# Patient Record
Sex: FEMALE | Employment: UNEMPLOYED | URBAN - NONMETROPOLITAN AREA
[De-identification: names, ages, dates, MRNs, and addresses within clinical notes are randomized per-mention and may not be internally consistent; named-entity substitution may affect disease eponyms.]

---

## 2019-07-25 ENCOUNTER — OFFICE VISIT (OUTPATIENT)
Dept: PHYSICAL MEDICINE AND REHAB | Age: 54
End: 2019-07-25
Payer: MEDICARE

## 2019-07-25 VITALS
RESPIRATION RATE: 20 BRPM | BODY MASS INDEX: 40.15 KG/M2 | HEIGHT: 63 IN | DIASTOLIC BLOOD PRESSURE: 90 MMHG | SYSTOLIC BLOOD PRESSURE: 135 MMHG | WEIGHT: 226.6 LBS | HEART RATE: 88 BPM

## 2019-07-25 DIAGNOSIS — M54.16 LUMBAR RADICULOPATHY: Primary | ICD-10-CM

## 2019-07-25 PROCEDURE — 99204 OFFICE O/P NEW MOD 45 MIN: CPT | Performed by: PHYSICAL MEDICINE & REHABILITATION

## 2019-07-25 PROCEDURE — G8427 DOCREV CUR MEDS BY ELIG CLIN: HCPCS | Performed by: PHYSICAL MEDICINE & REHABILITATION

## 2019-07-25 PROCEDURE — 1036F TOBACCO NON-USER: CPT | Performed by: PHYSICAL MEDICINE & REHABILITATION

## 2019-07-25 PROCEDURE — 3017F COLORECTAL CA SCREEN DOC REV: CPT | Performed by: PHYSICAL MEDICINE & REHABILITATION

## 2019-07-25 PROCEDURE — G8417 CALC BMI ABV UP PARAM F/U: HCPCS | Performed by: PHYSICAL MEDICINE & REHABILITATION

## 2019-07-25 RX ORDER — ALBUTEROL SULFATE 2.5 MG/3ML
2.5 SOLUTION RESPIRATORY (INHALATION) EVERY 6 HOURS PRN
COMMUNITY
Start: 2018-07-11

## 2019-07-25 RX ORDER — ROPINIROLE 2 MG/1
TABLET, FILM COATED ORAL
Refills: 0 | COMMUNITY
Start: 2019-07-04

## 2019-07-25 RX ORDER — PREGABALIN 75 MG/1
CAPSULE ORAL
Qty: 60 CAPSULE | Refills: 3 | Status: ON HOLD | OUTPATIENT
Start: 2019-07-25 | End: 2020-01-29 | Stop reason: HOSPADM

## 2019-07-25 RX ORDER — ACETAMINOPHEN 500 MG
TABLET ORAL
Refills: 0 | Status: ON HOLD | COMMUNITY
Start: 2019-05-08 | End: 2020-01-29 | Stop reason: HOSPADM

## 2019-07-25 RX ORDER — LORAZEPAM 0.5 MG/1
TABLET ORAL
Refills: 0 | Status: ON HOLD | COMMUNITY
Start: 2019-05-15 | End: 2020-01-29 | Stop reason: HOSPADM

## 2019-07-25 RX ORDER — CELECOXIB 200 MG/1
200 CAPSULE ORAL DAILY
Qty: 60 CAPSULE | Refills: 3 | Status: ON HOLD | OUTPATIENT
Start: 2019-07-25 | End: 2020-01-29 | Stop reason: HOSPADM

## 2019-07-25 RX ORDER — ONDANSETRON 4 MG/1
4 TABLET, FILM COATED ORAL EVERY 8 HOURS PRN
Status: ON HOLD | COMMUNITY
Start: 2018-05-07 | End: 2020-01-29 | Stop reason: HOSPADM

## 2019-07-25 RX ORDER — DILTIAZEM HYDROCHLORIDE 60 MG/1
TABLET, FILM COATED ORAL
Refills: 0 | COMMUNITY
Start: 2019-07-08

## 2019-07-25 RX ORDER — TRAMADOL HYDROCHLORIDE 50 MG/1
50 TABLET ORAL EVERY 6 HOURS PRN
Status: ON HOLD | COMMUNITY
End: 2020-01-29 | Stop reason: HOSPADM

## 2019-07-25 RX ORDER — CYCLOBENZAPRINE HCL 10 MG
TABLET ORAL
Refills: 0 | Status: ON HOLD | COMMUNITY
Start: 2019-07-08 | End: 2020-01-29 | Stop reason: HOSPADM

## 2019-07-25 RX ORDER — TRAZODONE HYDROCHLORIDE 100 MG/1
TABLET ORAL
Refills: 0 | Status: ON HOLD | COMMUNITY
Start: 2019-07-08 | End: 2020-01-29 | Stop reason: HOSPADM

## 2019-07-25 NOTE — PROGRESS NOTES
Pain Management     [facility]  [facility]  [address] [facility] [address] [place]  Dept: [phone]  Loc: [address] [place] [address] Isa Gayle MD  [facility] [address]  [place], [address]     Jayson Poe is a 47 y.o. female, who came in due to back pain, hip pain and knee pain    History of lumbar surgical decompression and fusion in Feb 2019, L5-S1 PLIF. Onset: 10years    Cause of the symptom(s): degenerative (arthritis)    Quality of Symptoms: aching, throbbing, burning, shooting, numbness and tingling    Aggravating factors: lifting, twisting and bending forward    Relieving factors: use of medication and exercise    Red Flags: pain at night and pain with lying down    Treatment done: physical therapy, injection, anti-inflammatory medication, muscle relaxant, steroid and opioid      Current pain level: 8 on the scale of 0-10 ( 10 being worst)     Came today due to pain, even after post surgery.     Worked as a  at [employer]    Allergies   Allergen Reactions    Amitriptyline      Other reaction(s): Confusion, Palpitations    Iodides Anaphylaxis    Penicillins Hives    Codeine Nausea And Vomiting     Nausea and vomiting      Naproxen Sodium      Other reaction(s): Ulcers/Lesions    Oxycodone-Acetaminophen Other (See Comments)    Sulfa Antibiotics      Other reaction(s): Vomiting and HIves  Nausea and vomiting      Allobarbital        Social History     Socioeconomic History    Marital status: Unknown     Spouse name: Not on file    Number of children: Not on file    Years of education: Not on file    Highest education level: Not on file   Occupational History    Not on file   Social Needs    Financial resource strain: Not on file    Food insecurity:     Worry: Not on file     Inability: Not on file    Transportation needs:     Medical: Not on file     Non-medical: Not on file   Tobacco Use    normal.       Neurologic Exam     Mental Status   Oriented to person, place, and time. Attention: normal.   Speech: speech is normal   Level of consciousness: alert  Normal comprehension. Ortho Exam  Tender , lateral and medial knee       Assessment and Plan:   Post Lumbar Laminectomy   Left Knee DJD         Trial of celebrex 200 mg per day. Trial of lyrica. Follow up in 1 month. Time spent with patient was 45 minutes. More than 50% was spent counseling/coordinating the patient's care.        Krysta Dubrin MD   Spine Medicine/PM&R

## 2019-07-26 ENCOUNTER — TELEPHONE (OUTPATIENT)
Dept: PHYSICAL MEDICINE AND REHAB | Age: 54
End: 2019-07-26

## 2020-01-21 ENCOUNTER — HOSPITAL ENCOUNTER (OUTPATIENT)
Age: 55
Discharge: HOME | End: 2020-01-21
Payer: COMMERCIAL

## 2020-01-21 ENCOUNTER — HOSPITAL ENCOUNTER (OUTPATIENT)
Dept: GENERAL RADIOLOGY | Age: 55
Discharge: HOME | End: 2020-01-21
Payer: COMMERCIAL

## 2020-01-21 PROCEDURE — 73560 X-RAY EXAM OF KNEE 1 OR 2: CPT

## 2020-01-25 ENCOUNTER — HOSPITAL ENCOUNTER (INPATIENT)
Age: 55
LOS: 4 days | Discharge: HOME | DRG: 753 | End: 2020-01-29
Attending: PSYCHIATRY & NEUROLOGY | Admitting: PSYCHIATRY & NEUROLOGY
Payer: MEDICARE

## 2020-01-25 PROBLEM — F31.9 BIPOLAR 1 DISORDER (HCC): Status: ACTIVE | Noted: 2020-01-25

## 2020-01-25 PROCEDURE — 1240000000 HC EMOTIONAL WELLNESS R&B

## 2020-01-25 PROCEDURE — 6370000000 HC RX 637 (ALT 250 FOR IP): Performed by: PSYCHIATRY & NEUROLOGY

## 2020-01-25 RX ORDER — HYDROXYZINE HYDROCHLORIDE 25 MG/1
25 TABLET, FILM COATED ORAL 3 TIMES DAILY PRN
Status: DISCONTINUED | OUTPATIENT
Start: 2020-01-25 | End: 2020-01-29 | Stop reason: HOSPADM

## 2020-01-25 RX ORDER — ALBUTEROL SULFATE 2.5 MG/3ML
2.5 SOLUTION RESPIRATORY (INHALATION) EVERY 6 HOURS PRN
Status: DISCONTINUED | OUTPATIENT
Start: 2020-01-25 | End: 2020-01-29 | Stop reason: HOSPADM

## 2020-01-25 RX ORDER — SERTRALINE HYDROCHLORIDE 25 MG/1
25 TABLET, FILM COATED ORAL DAILY
Status: ON HOLD | COMMUNITY
End: 2020-01-29 | Stop reason: HOSPADM

## 2020-01-25 RX ORDER — PANTOPRAZOLE SODIUM 40 MG/1
40 TABLET, DELAYED RELEASE ORAL DAILY
Status: DISCONTINUED | OUTPATIENT
Start: 2020-01-25 | End: 2020-01-29 | Stop reason: HOSPADM

## 2020-01-25 RX ORDER — BENZTROPINE MESYLATE 1 MG/ML
2 INJECTION INTRAMUSCULAR; INTRAVENOUS 2 TIMES DAILY PRN
Status: DISCONTINUED | OUTPATIENT
Start: 2020-01-25 | End: 2020-01-29 | Stop reason: HOSPADM

## 2020-01-25 RX ORDER — SERTRALINE HYDROCHLORIDE 25 MG/1
25 TABLET, FILM COATED ORAL DAILY
Status: DISCONTINUED | OUTPATIENT
Start: 2020-01-25 | End: 2020-01-26

## 2020-01-25 RX ORDER — MAGNESIUM HYDROXIDE/ALUMINUM HYDROXICE/SIMETHICONE 120; 1200; 1200 MG/30ML; MG/30ML; MG/30ML
15 SUSPENSION ORAL EVERY 6 HOURS PRN
Status: DISCONTINUED | OUTPATIENT
Start: 2020-01-25 | End: 2020-01-29 | Stop reason: HOSPADM

## 2020-01-25 RX ORDER — ROPINIROLE 2 MG/1
4 TABLET, FILM COATED ORAL NIGHTLY
Status: DISCONTINUED | OUTPATIENT
Start: 2020-01-25 | End: 2020-01-29 | Stop reason: HOSPADM

## 2020-01-25 RX ORDER — PANTOPRAZOLE SODIUM 40 MG/1
40 TABLET, DELAYED RELEASE ORAL DAILY
COMMUNITY

## 2020-01-25 RX ORDER — NICOTINE 21 MG/24HR
1 PATCH, TRANSDERMAL 24 HOURS TRANSDERMAL DAILY
Status: DISCONTINUED | OUTPATIENT
Start: 2020-01-25 | End: 2020-01-27

## 2020-01-25 RX ORDER — ALBUTEROL SULFATE 90 UG/1
2 AEROSOL, METERED RESPIRATORY (INHALATION) EVERY 6 HOURS PRN
Status: DISCONTINUED | OUTPATIENT
Start: 2020-01-25 | End: 2020-01-29 | Stop reason: HOSPADM

## 2020-01-25 RX ORDER — TRAZODONE HYDROCHLORIDE 50 MG/1
50 TABLET ORAL NIGHTLY PRN
Status: DISCONTINUED | OUTPATIENT
Start: 2020-01-25 | End: 2020-01-27

## 2020-01-25 RX ADMIN — TRAZODONE HYDROCHLORIDE 50 MG: 50 TABLET ORAL at 22:10

## 2020-01-25 RX ADMIN — Medication 2 PUFF: at 22:14

## 2020-01-25 RX ADMIN — METOPROLOL TARTRATE 25 MG: 25 TABLET ORAL at 22:09

## 2020-01-25 RX ADMIN — ROPINIROLE HYDROCHLORIDE 4 MG: 2 TABLET, FILM COATED ORAL at 22:08

## 2020-01-25 RX ADMIN — PANTOPRAZOLE SODIUM 40 MG: 40 TABLET, DELAYED RELEASE ORAL at 22:09

## 2020-01-25 RX ADMIN — SERTRALINE HYDROCHLORIDE 25 MG: 25 TABLET ORAL at 22:09

## 2020-01-25 ASSESSMENT — SLEEP AND FATIGUE QUESTIONNAIRES
DIFFICULTY ARISING: NO
AVERAGE NUMBER OF SLEEP HOURS: 4
SLEEP PATTERN: DIFFICULTY FALLING ASLEEP;INSOMNIA;RESTLESSNESS
DO YOU USE A SLEEP AID: NO
DIFFICULTY STAYING ASLEEP: YES
RESTFUL SLEEP: NO
DO YOU HAVE DIFFICULTY SLEEPING: YES
DIFFICULTY FALLING ASLEEP: YES

## 2020-01-25 ASSESSMENT — PAIN DESCRIPTION - LOCATION: LOCATION: KNEE;BACK

## 2020-01-25 ASSESSMENT — PAIN DESCRIPTION - FREQUENCY: FREQUENCY: CONTINUOUS

## 2020-01-25 ASSESSMENT — LIFESTYLE VARIABLES: HISTORY_ALCOHOL_USE: NO

## 2020-01-25 ASSESSMENT — PAIN DESCRIPTION - ONSET: ONSET: ON-GOING

## 2020-01-25 ASSESSMENT — PATIENT HEALTH QUESTIONNAIRE - PHQ9: SUM OF ALL RESPONSES TO PHQ QUESTIONS 1-9: 18

## 2020-01-25 ASSESSMENT — PAIN SCALES - GENERAL
PAINLEVEL_OUTOF10: 0
PAINLEVEL_OUTOF10: 8

## 2020-01-25 ASSESSMENT — PAIN DESCRIPTION - DIRECTION: RADIATING_TOWARDS: LEFT HIP

## 2020-01-25 ASSESSMENT — PAIN DESCRIPTION - ORIENTATION: ORIENTATION: LEFT

## 2020-01-25 ASSESSMENT — PAIN DESCRIPTION - PAIN TYPE: TYPE: CHRONIC PAIN

## 2020-01-25 ASSESSMENT — PAIN DESCRIPTION - DESCRIPTORS: DESCRIPTORS: ACHING;CONSTANT

## 2020-01-25 ASSESSMENT — PAIN - FUNCTIONAL ASSESSMENT: PAIN_FUNCTIONAL_ASSESSMENT: PREVENTS OR INTERFERES SOME ACTIVE ACTIVITIES AND ADLS

## 2020-01-26 PROCEDURE — 6370000000 HC RX 637 (ALT 250 FOR IP): Performed by: PSYCHIATRY & NEUROLOGY

## 2020-01-26 PROCEDURE — 6370000000 HC RX 637 (ALT 250 FOR IP): Performed by: NURSE PRACTITIONER

## 2020-01-26 PROCEDURE — 1240000000 HC EMOTIONAL WELLNESS R&B

## 2020-01-26 PROCEDURE — 97161 PT EVAL LOW COMPLEX 20 MIN: CPT

## 2020-01-26 PROCEDURE — 90792 PSYCH DIAG EVAL W/MED SRVCS: CPT | Performed by: NURSE PRACTITIONER

## 2020-01-26 PROCEDURE — 6370000000 HC RX 637 (ALT 250 FOR IP): Performed by: PHYSICIAN ASSISTANT

## 2020-01-26 RX ORDER — ONDANSETRON 4 MG/1
4 TABLET, FILM COATED ORAL EVERY 8 HOURS PRN
Status: DISPENSED | OUTPATIENT
Start: 2020-01-26 | End: 2020-01-29

## 2020-01-26 RX ORDER — DIAPER,BRIEF,INFANT-TODD,DISP
EACH MISCELLANEOUS 2 TIMES DAILY
Status: DISCONTINUED | OUTPATIENT
Start: 2020-01-26 | End: 2020-01-29 | Stop reason: HOSPADM

## 2020-01-26 RX ORDER — ARIPIPRAZOLE 5 MG/1
5 TABLET ORAL DAILY
Status: DISCONTINUED | OUTPATIENT
Start: 2020-01-26 | End: 2020-01-29 | Stop reason: HOSPADM

## 2020-01-26 RX ADMIN — SERTRALINE HYDROCHLORIDE 25 MG: 25 TABLET ORAL at 08:44

## 2020-01-26 RX ADMIN — METOPROLOL TARTRATE 25 MG: 25 TABLET ORAL at 08:44

## 2020-01-26 RX ADMIN — TRAZODONE HYDROCHLORIDE 50 MG: 50 TABLET ORAL at 22:23

## 2020-01-26 RX ADMIN — HYDROCORTISONE: 1 CREAM TOPICAL at 22:28

## 2020-01-26 RX ADMIN — ARIPIPRAZOLE 5 MG: 5 TABLET ORAL at 22:23

## 2020-01-26 RX ADMIN — METOPROLOL TARTRATE 25 MG: 25 TABLET ORAL at 22:22

## 2020-01-26 RX ADMIN — Medication 2 PUFF: at 22:22

## 2020-01-26 RX ADMIN — PANTOPRAZOLE SODIUM 40 MG: 40 TABLET, DELAYED RELEASE ORAL at 08:43

## 2020-01-26 RX ADMIN — HYDROXYZINE HYDROCHLORIDE 25 MG: 25 TABLET, FILM COATED ORAL at 17:52

## 2020-01-26 RX ADMIN — ROPINIROLE HYDROCHLORIDE 4 MG: 2 TABLET, FILM COATED ORAL at 22:23

## 2020-01-26 RX ADMIN — Medication 2 PUFF: at 08:46

## 2020-01-26 ASSESSMENT — PAIN DESCRIPTION - DESCRIPTORS: DESCRIPTORS: TIGHTNESS;SQUEEZING

## 2020-01-26 ASSESSMENT — PAIN DESCRIPTION - FREQUENCY: FREQUENCY: CONTINUOUS

## 2020-01-26 ASSESSMENT — PAIN SCALES - GENERAL: PAINLEVEL_OUTOF10: 8

## 2020-01-26 ASSESSMENT — PAIN DESCRIPTION - ORIENTATION: ORIENTATION: LEFT

## 2020-01-26 ASSESSMENT — LIFESTYLE VARIABLES: HISTORY_ALCOHOL_USE: NO

## 2020-01-26 ASSESSMENT — PAIN DESCRIPTION - LOCATION: LOCATION: KNEE

## 2020-01-26 ASSESSMENT — PAIN DESCRIPTION - PAIN TYPE: TYPE: CHRONIC PAIN

## 2020-01-26 NOTE — GROUP NOTE
Group Therapy Note    Date: 1/26/2020    Group Start Time: 1100  Group End Time: 5745  Group Topic: Group Documentation    [facility]    Jillian Tijerina LPN        Group Therapy Note    Attendees:17/17    Patient participated with staff doing a safety check of room.        Signature:  Jillian Tijerina LPN

## 2020-01-26 NOTE — H&P
HISTORY and [address] MORALES [address]       NAME:  Tuyet Lawton  MRN: [id number]   YOB: 1965   Date: 1/26/2020   Age: 49 y.o. Gender: female     COMPLAINT AND PRESENT HISTORY:      Tuyet Lawton is 49 y.o.,  female, involuntarily admitted because of Bipolar Disorder. Pt presented to [facility] ED with complaints of increased depression x 3-4 weeks. She admitted to suicidal ideations with \"mulitple plans in place\". Pt stated she had plans to either OD on her medications or go outside and freeze to death. She denied HI, AH, VH in ED. Cooperative upon admit to [facility]. Pt currently denies suicidal and homicidal ideations. Pt has a history of previous suicide attempts. No current auditory, visual or tactile hallucinations. Pt feels hopeless, helpless, anxious and lonely. She has no family support and states her depression symptoms get worse when she is home alone. Poor insight. Pt has poor sleep and loss of appetite. Patients current stressors include loss of her job and housing. Pt states she used to live in a camper parked in her sister's yard, but cannot afford to stay there any longer. She is now currently homeless. Patient denies any current alcohol or substance abuse, occasional marijuana use. Pt has been non compliant with her outpatient psychiatric medications x 5-6 months. DIAGNOSTIC RESULTS   Labs:  CBC: No results for input(s): WBC, HGB, PLT in the last 72 hours. BMP:  No results for input(s): NA, K, CL, CO2, BUN, CREATININE, GLUCOSE in the last 72 hours. Hepatic: No results for input(s): AST, ALT, ALB, BILITOT, ALKPHOS in the last 72 hours. Lipids: No results for input(s): CHOL, HDL in the last 72 hours.     Invalid input(s): LDLCALCU  U/A:No results found for: NITRITE, COLORU, WBCUA, RBCUA, MUCUS, BACTERIA, CLARITYU, SPECGRAV, LEUKOCYTESUR, BLOODU, GLUCOSEU, AMORPHOUS    PAST MEDICAL HISTORY     Past Medical History:   Diagnosis Date    Arthritis     Asthma     COPD (chronic obstructive pulmonary disease) (HCC)     Eczema of face     Hypertension     Psychiatric problem     Restless legs      Pt denies any history of Diabetes mellitus type 2, stroke, heart disease, GERD, HLD, Cancer, Seizures,Thyroid disease, Kidney Disease, Hepatitis, TB.    SURGICAL HISTORY       Past Surgical History:   Procedure Laterality Date    BACK SURGERY      LUMBAR FUSION      TOTAL KNEE ARTHROPLASTY Left        FAMILY HISTORY     No family history on file.     SOCIAL HISTORY       Social History     Socioeconomic History    Marital status: Unknown     Spouse name: Not on file    Number of children: Not on file    Years of education: Not on file    Highest education level: Not on file   Occupational History    Not on file   Social Needs    Financial resource strain: Not on file    Food insecurity:     Worry: Not on file     Inability: Not on file    Transportation needs:     Medical: Not on file     Non-medical: Not on file   Tobacco Use    Smoking status: Never Smoker    Smokeless tobacco: Never Used   Substance and Sexual Activity    Alcohol use: Not Currently    Drug use: Never    Sexual activity: Not on file   Lifestyle    Physical activity:     Days per week: Not on file     Minutes per session: Not on file    Stress: Not on file   Relationships    Social connections:     Talks on phone: Not on file     Gets together: Not on file     Attends Mandaen service: Not on file     Active member of club or organization: Not on file     Attends meetings of clubs or organizations: Not on file     Relationship status: Not on file    Intimate partner violence:     Fear of current or ex partner: Not on file     Emotionally abused: Not on file     Physically abused: Not on file     Forced sexual activity: Not on file   Other Topics Concern    Not on file   Social History Narrative    Not on file        REVIEW OF SYSTEMS      Allergies   Allergen Reactions    Amitriptyline      Other reaction(s): Confusion, Palpitations    Iodides Anaphylaxis    Penicillins Hives    Codeine Nausea And Vomiting     Nausea and vomiting      Naproxen Sodium      Other reaction(s): Ulcers/Lesions    Oxycodone-Acetaminophen Other (See Comments)    Sulfa Antibiotics      Other reaction(s): Vomiting and HIves  Nausea and vomiting      Allobarbital        No current facility-administered medications on file prior to encounter. Current Outpatient Medications on File Prior to Encounter   Medication Sig Dispense Refill    sertraline (ZOLOFT) 25 MG tablet Take 25 mg by mouth daily      pantoprazole (PROTONIX) 40 MG tablet Take 40 mg by mouth daily      traZODone (DESYREL) 100 MG tablet take 2 tablets by mouth at bedtime if needed  0    traMADol (ULTRAM) 50 MG tablet Take 50 mg by mouth every 6 hours as needed.        rOPINIRole (REQUIP) 2 MG tablet take 2 tablets by mouth at bedtime  0    ondansetron (ZOFRAN) 4 MG tablet Take 4 mg by mouth every 8 hours as needed       metoprolol tartrate (LOPRESSOR) 25 MG tablet take 1 tablet by mouth twice a day  0    LORazepam (ATIVAN) 0.5 MG tablet take 1 tablet by mouth three times a day if needed  0    VENTOLIN  (90 Base) MCG/ACT inhaler inhale 2 puffs by mouth four times a day if needed for wheezing  0    albuterol (PROVENTIL) (2.5 MG/3ML) 0.083% nebulizer solution Take 2.5 mg by nebulization every 6 hours as needed       RA ACETAMINOPHEN EX  MG tablet take 2 tablets by mouth every 6 hours if needed for pain  0    SYMBICORT 80-4.5 MCG/ACT AERO inhale 2 puffs by mouth twice a day  0    cyclobenzaprine (FLEXERIL) 10 MG tablet take 1 tablet by mouth three times a day if needed for muscle spasm  0    pregabalin (LYRICA) 75 MG capsule 1 tab at bed ( 10 pm) for 3 days then twice a day ( 10 pm, 8 am) 60 capsule 3    celecoxib (CELEBREX) 200 MG capsule Take 1 capsule by mouth daily 60 capsule 3       General health:  Fairly good. No fever or chills. Skin:  C/o facial eczema. Pt states she has prescription ointment at home. The OTC lotion provided has not helped her atopic dermatitis. Pt agreeable to try short course of low dose hydrocortisone cream. No other itching, redness or rash. Head, eyes, ears, nose, throat: No headache, epistaxis, rhinorrhea, hearing loss or sore throat. Neck:  No pain, stiffness or masses. Cardiovascular/Respiratory system:  Pt states when she gets anxious she has SOB different than her COPD or asthma baseline. No chest pain, palpitation, current shortness of breath, coughing or expectoration. Gastrointestinal tract: Pt c/o nausea and occasional vomiting when taking her pills at night. Pt agreeable to Zofran prn. No abdominal pain, current nausea, vomiting, dysphagia, diarrhea or constipation. Genitourinary:  No burning on micturition. No hesitancy, urgency, frequency or discoloration of urine. Locomotor: Pt c/o generalized arthralgias and myalgias, most prominent over left knee and lumbar spine. No swelling or deformities. Pt endorses interest in pain management. Neuropsychiatric:  See HPI. GENERAL PHYSICAL EXAM:     Vitals: /78   Pulse 71   Temp 97.9 °F (36.6 °C) (Oral)   Resp 14   Ht 5' 4\" (1.626 m)   Wt 234 lb (106.1 kg)   SpO2 97%   BMI 40.17 kg/m²  Body mass index is 40.17 kg/m². GENERAL APPEARANCE:  Venkata Pabon is 49 y.o.,  female, severely obese, nourished, conscious, alert. Does not appear to be distress or pain at this time. Pt was pleasant and cooperative with examination. SKIN:  Warm, dry, no cyanosis or jaundice. Mild atopic dermatitis notice on forehead, bridge of nose, around mouth, and anterior to b/l ears. No excoriation marks, discharge or bleeding. No pustules. HEAD:  Normocephalic, atraumatic, no swelling or tenderness.      EYES:  Pupils equal, reactive to light, Conjunctiva is clear, EOMs intact ni. eyelids WNL. EARS:  No discharge, no marked hearing loss. NOSE:  No rhinorrhea, epistaxis or septal deformity. THROAT:  Not congested. No ulceration bleeding or discharge. NECK:  No stiffness, trachea central. No palpable masses or L.N.      CHEST:  Symmetrical and equal on expansion. HEART:  Regular rate and rhythm. S1 > S2, No audible murmurs or gallops. LUNGS:  Equal on expansion, normal breath sounds. No adventitious sounds. ABDOMEN:  Obese. NABS x 4 quadrants. Soft on palpation. No localized tenderness. No guarding or rigidity. No palpable organomegaly. LYMPHATICS:  No palpable cervical Lymphadenopathy. LOCOMOTOR, BACK AND SPINE:  No deformities. Able to ambulate without assistive devices. EXTREMITIES:  Symmetrical, mild nonpitting pretibial edema. No discoloration or ulcerations. NEUROLOGIC:  The patient is conscious, alert, oriented,Cranial nerve II-XII intact, taste and smell were not examined. No apparent focal sensory or motor deficits. No facial droop, tongue protrudes centrally, no slurring of the speech. PROVISIONAL DIAGNOSES:      Active Problems:    Bipolar 1 disorder (HCC)  Resolved Problems:    * No resolved hospital problems.  *      YULISA Alvarez on 1/26/2020 at 2:54 PM

## 2020-01-26 NOTE — BH NOTE
Plan:     Admit to inpatient psychiatric treatment   Supportive therapy with medication management. Reviewed risks and benefits as well as potential side effects with patient.  Therapeutic activities and groups   Increase Zoloft to 50 mg for anxiety, and Abilify for mood stabilization. No controlled substances.  Continue Hydroxyzine prn anxiety.    Follow up at [facility] after symptoms stabilized    Estimated length of stay: 5-7 days    Levi Kilgore APRN - CNP  Psychiatric Advanced Practice Nurse

## 2020-01-26 NOTE — BH NOTE
Situation  Attention:Normal: No  Attention: Others(See comment)(tearful)  Thought Processes: Circumstantial  Thought Content:Normal: Yes  Hallucinations: None(denies)  Delusions: No  Memory:Normal: Yes  Insight and Judgment: No  Insight and Judgment: Poor Judgment, Poor Insight, Unmotivated  Present Suicidal Ideation: Yes  Present Homicidal Ideation: No    Tobacco Screening:  Practical Counseling, on admission, tonya X, if applicable and completed (first 3 are required if patient doesn't refuse):            ( )  Recognizing danger situations (included triggers and roadblocks)                    ( )  Coping skills (new ways to manage stress, exercise, relaxation techniques, changing routine, distraction)                                                           ( )  Basic information about quitting (benefits of quitting, techniques in how to quit, available resources  ( ) Referral for counseling faxed to [facility]                ( ) Patient refused counseling  (X ) Patient has not smoked in the last 30 days    Metabolic Screening:    No results found for: LABA1C    No results found for: CHOL  No results found for: TRIG  No results found for: HDL  No components found for: LDLCAL  No results found for: LABVLDL      Body mass index is 40.17 kg/m². BP Readings from Last 2 Encounters:   01/25/20 122/79   07/25/19 (!) 135/90           Pt admitted with followings belongings:  Dentures: None  Vision - Corrective Lenses: None  Hearing Aid: None  Jewelry: None  Body Piercings Removed: N/A  Clothing: Footwear, Jacket / coat, Pants, Shirt  Were All Patient Medications Collected?: Yes  Other Valuables: Other (Comment)(patient valuables locked in HUB safe #6436)      Valuables placed in safe in security envelope, number:  [id number]. Patient's home medications were varified with [facility] . Patient oriented to surroundings and program expectations and copy of patient rights given.  Received admission packet: yes.  Consents reviewed, signed yes. Patient verbalize understanding:  yes. Patient education on precautions: yes    Patient admitted to [facility] at 1600 room 209 bed one. Patient changed out into hospital attire and scanned with metal detector. food and beverage was provided to patient,   Physician aware of suicidal ideation and patient michelle for safety. patient willing to seek out staff if feelings of self harm were to continue. Every 15 minute checks and random spontaneous checks/roundings to continue for patient safety.                          Michelle Larkin RN

## 2020-01-26 NOTE — BH NOTE
Writer called [facility] pharmacy to verify patient medications.  Ativan 0.5 mg PRN has not been filled since august 2019 and Zofran since Sept

## 2020-01-26 NOTE — GROUP NOTE
Group Therapy Note    Date: 1/26/2020    Group Start Time: 0660  Group End Time: 0900  Group Topic: Community Meeting    FAVIAN BAUDILIO LOPEZ    [place], [address]    Patient's Goal:  To demonstrate increased interpersonal interaction. Notes:  Pt attended and participated in group. Status After Intervention:  Improved    Participation Level:  Active Listener and Interactive    Participation Quality: Appropriate and Attentive      Speech:  normal      Thought Process/Content: Logical      Affective Functioning: Congruent      Mood: euthymic      Level of consciousness:  Alert and Attentive      Response to Learning: Progressing to goal      Endings: None Reported

## 2020-01-26 NOTE — PLAN OF CARE
Helpless  Motor Activity:Normal: Yes  Motor Activity: Decreased  Interview Behavior: Cooperative  Preception: [place] to Person, Amena Arvizu to Time, [place] to Place, [place] to Situation  Attention:Normal: No  Attention: Distractible  Thought Processes: Circumstantial  Thought Content:Normal: Yes  Hallucinations: None(denies)  Delusions: No  Memory:Normal: Yes  Insight and Judgment: No  Insight and Judgment: Poor Insight, Poor Judgment  Present Suicidal Ideation: Yes  Present Homicidal Ideation: No    EDUCATION:   Learner Progress Toward Treatment Goals: reviewed group plans and strategies for care    Method:group therapy, medication compliance, individualized assessments and care planning    Outcome: needs reinforcement    PATIENT GOALS: to be discussed with patient within 72 hours    PLAN/TREATMENT RECOMMENDATIONS:     continue group therapy , medications compliance, goal setting, individualized assessments and care, continue to monitor pt on unit      SHORT-TERM GOALS:   Time frame for Short-Term Goals: 5-7 days    LONG-TERM GOALS:  Time frame for Long-Term Goals: 6 months  Members Present in Team Meeting: See Signature Sheet    [place] [address]

## 2020-01-26 NOTE — CARE COORDINATION
BHI Biopsychosocial Assessment     Current Level of Psychosocial Functioning      Independent: x  Dependent:    Minimal Assist:       Comments: Pt states she lives in her camper, on her sister's land and will not be able to return discharge, due to not paying sister rent. Psychosocial High Risk Factors (check all that apply)     Unable to obtain meds:  x  Chronic illness/pain:     Substance abuse:    Lack of Family Support: x   Financial stress:  x  Isolation:  x  Inadequate Community Resources:    Suicide attempt(s): x   Not taking medications:  x   Victim of crime:    Developmental Delay:     Unable to manage personal needs: x  Age 72 or older:    Homeless:  x  No transportation:  x  Readmission within 30 days:    Unemployment:  x  Traumatic Event:      Comments: Pt states she cannot work due to her medical issues     Psychiatric Advanced Directives: None reported     Family to Mikey Enrique in Treatment: Willard Wilde, [phone], AAKASH on file     Sexual Orientation: Heterosexual     Patient Strengths: Insurance and sought help     Patient Barriers: Lack of legal income, lack of coping skills, increased stress and increased anxiety, poor insight and judgement, lack of stable housing, lack of employment, [place] linked to [facility], not medication compliant, lack of support system     Opiate Education: N/A     [facility] [place] [address] [place] [place] [address] on file. Cancelled appointment for 1/27/2020 with Lauralyn Kehr, will rescheduled upon discharge. Plan of Care   medication management, group/individual therapies, family meetings, psycho -education, treatment team meetings to assist with stabilization     Initial Discharge Plan: To become stable on medications, return to community, and follow up with [facility]. Clinical Summary:       Pt is a 49year-old  female who presented to the ED with suicidal ideation. Pt states her appetite has been decreased and she has been sleeping very poorly.  Pt

## 2020-01-27 PROCEDURE — 6370000000 HC RX 637 (ALT 250 FOR IP): Performed by: NURSE PRACTITIONER

## 2020-01-27 PROCEDURE — 6370000000 HC RX 637 (ALT 250 FOR IP): Performed by: PSYCHIATRY & NEUROLOGY

## 2020-01-27 PROCEDURE — 6370000000 HC RX 637 (ALT 250 FOR IP): Performed by: PHYSICIAN ASSISTANT

## 2020-01-27 PROCEDURE — 99232 SBSQ HOSP IP/OBS MODERATE 35: CPT | Performed by: NURSE PRACTITIONER

## 2020-01-27 PROCEDURE — 1240000000 HC EMOTIONAL WELLNESS R&B

## 2020-01-27 RX ADMIN — ARIPIPRAZOLE 5 MG: 5 TABLET ORAL at 09:11

## 2020-01-27 RX ADMIN — ROPINIROLE HYDROCHLORIDE 4 MG: 2 TABLET, FILM COATED ORAL at 22:13

## 2020-01-27 RX ADMIN — METOPROLOL TARTRATE 25 MG: 25 TABLET ORAL at 22:13

## 2020-01-27 RX ADMIN — ALBUTEROL SULFATE 2 PUFF: 90 AEROSOL, METERED RESPIRATORY (INHALATION) at 22:11

## 2020-01-27 RX ADMIN — Medication 2 PUFF: at 09:12

## 2020-01-27 RX ADMIN — HYDROCORTISONE: 1 CREAM TOPICAL at 09:14

## 2020-01-27 RX ADMIN — HYDROCORTISONE: 1 CREAM TOPICAL at 22:18

## 2020-01-27 RX ADMIN — DICLOFENAC 2 G: 10 GEL TOPICAL at 17:33

## 2020-01-27 RX ADMIN — HYDROXYZINE HYDROCHLORIDE 25 MG: 25 TABLET, FILM COATED ORAL at 17:05

## 2020-01-27 RX ADMIN — Medication 2 MG: at 22:13

## 2020-01-27 RX ADMIN — SERTRALINE HYDROCHLORIDE 50 MG: 50 TABLET ORAL at 09:11

## 2020-01-27 RX ADMIN — METOPROLOL TARTRATE 25 MG: 25 TABLET ORAL at 09:10

## 2020-01-27 RX ADMIN — HYDROXYZINE HYDROCHLORIDE 25 MG: 25 TABLET, FILM COATED ORAL at 01:19

## 2020-01-27 RX ADMIN — PANTOPRAZOLE SODIUM 40 MG: 40 TABLET, DELAYED RELEASE ORAL at 09:11

## 2020-01-27 ASSESSMENT — PAIN SCALES - GENERAL
PAINLEVEL_OUTOF10: 6
PAINLEVEL_OUTOF10: 0

## 2020-01-27 NOTE — GROUP NOTE
Group Therapy Note    Date: 1/27/2020    Group Start Time: 1000  Group End Time: 1034  Group Topic: Psychoeducation    SHIN Eagle        Group Therapy Note    Attendees: 7/17         Patient's Goal:  Increase interpersonal relationship skills. Notes:  Pt actively listened and engaged during group discussion. Status After Intervention:  Improved    Participation Level:  Active Listener and Interactive    Participation Quality: Appropriate, Attentive, Sharing and Supportive      Speech:  normal      Thought Process/Content: Logical  Linear      Affective Functioning: Congruent      Mood: anxious      Level of consciousness:  Alert, Oriented x4 and Attentive      Response to Learning: Able to verbalize current knowledge/experience, Able to verbalize/acknowledge new learning, Able to retain information, Capable of insight, Able to change behavior and Progressing to goal      Endings: None Reported    Modes of Intervention: Education, Support, Socialization, Clarifying, Problem-solving, Limit-setting and Reality-testing      Discipline Responsible: /Counselor      Signature:  SHIN Soriano

## 2020-01-27 NOTE — PROGRESS NOTES
ondansetron (ZOFRAN) tablet 4 mg  4 mg Oral Q8H PRN YULISA España        hydrocortisone 1 % cream   Topical BID YULISA España        sertraline (ZOLOFT) tablet 50 mg  50 mg Oral Daily Martha Cumins, APRN - CNP   50 mg at 01/27/20 0911    ARIPiprazole (ABILIFY) tablet 5 mg  5 mg Oral Daily Martha Cumins, APRN - CNP   5 mg at 01/27/20 0911    traZODone (DESYREL) tablet 50 mg  50 mg Oral Nightly PRN Rayo Salinas MD   50 mg at 01/26/20 2223    benztropine mesylate (COGENTIN) injection 2 mg  2 mg Intramuscular BID PRN Rayo Salinas MD        magnesium hydroxide (MILK OF MAGNESIA) 400 MG/5ML suspension 30 mL  30 mL Oral Daily PRN Rayo Salinas MD        aluminum & magnesium hydroxide-simethicone (MAALOX) 200-200-20 MG/5ML suspension 15 mL  15 mL Oral Q6H PRN Rayo Salinas MD        nicotine (NICODERM CQ) 21 MG/24HR 1 patch  1 patch Transdermal Daily Rayo Salinas MD        hydrOXYzine (ATARAX) tablet 25 mg  25 mg Oral TID PRN Rayo Salinas MD   25 mg at 01/27/20 0119    albuterol (PROVENTIL) nebulizer solution 2.5 mg  2.5 mg Nebulization Q6H PRN Rayo Salinas MD        metoprolol tartrate (LOPRESSOR) tablet 25 mg  25 mg Oral BID Rayo Salinas MD   25 mg at 01/27/20 0910    pantoprazole (PROTONIX) tablet 40 mg  40 mg Oral Daily Rayo Salinas MD   40 mg at 01/27/20 0911    rOPINIRole (REQUIP) tablet 4 mg  4 mg Oral Nightly Rayo Salinas MD   4 mg at 01/26/20 2223    mometasone-formoterol (DULERA) 100-5 MCG/ACT inhaler 2 puff  2 puff Inhalation BID Rayo Salinas MD   2 puff at 01/27/20 0912    albuterol sulfate  (90 Base) MCG/ACT inhaler 2 puff  2 puff Inhalation Q6H PRN Rayo Salinas MD             hydrocortisone   Topical BID    sertraline  50 mg Oral Daily    ARIPiprazole  5 mg Oral Daily    nicotine  1 patch Transdermal Daily    metoprolol tartrate  25 mg Oral BID    pantoprazole  40 mg Oral Daily    rOPINIRole  4 mg Oral Nightly    mometasone-formoterol  2 puff Inhalation BID       ASSESSMENT  Bipolar 1 disorder (HCC)     Patient's Response to Treatment: slowly     PLAN  · Continue inpatient psychiatric treatment  · Supportive therapy with medication management. Reviewed risks and benefits as well as potential side effects with patient. Discontinue trazodone and start the patient on melatonin for insomnia. · Therapeutic activities and groups  · Follow up at [facility] after symptoms stabilized     Estimated length of stay will be additional 3-5 days. Electronically signed by AIMEE Preciado CNP on 1/27/2020 at 2:37 PM.    Dragon voice recognition software used in portions of this document.

## 2020-01-27 NOTE — GROUP NOTE
Group Therapy Note    Date: 1/27/2020    Group Start Time: 1100  Group End Time: 3807  Group Topic: Recreational    [facility] BA[facility] Patel Found, [facility]        Group Therapy Note    Attendees: 5         Patient's Goal:  To increase interpersonal skills     Notes:  Patient attended group and participated     Status After Intervention:  Improved    Participation Level:  Active Listener and Interactive    Participation Quality: Appropriate, Attentive and Sharing      Speech:  normal      Thought Process/Content: Logical      Affective Functioning: Congruent      Mood: euthymic      Level of consciousness:  Alert and Oriented x4      Response to Learning: Progressing to goal      Endings: None Reported    Modes of Intervention: Socialization, Activity and Movement      Discipline Responsible: Psychoeducational Specialist      Signature:  Margie Mullins

## 2020-01-27 NOTE — PLAN OF CARE
5 [facility]  Day 3 Interdisciplinary Treatment Plan NOTE    Review Date & Time: 01/27/2020 9:30AM    Admission Type:   Admission Type: Involuntary    Reason for admission:  Reason for Admission: Patient admits to feeling of harming self, reported \"I have several plans\",  patient reported she has means to all her plans of self harm. patient agreeable to safety while inpatient, patient stated \"I came in for help\". patient reported she has been feeling depressed for weeks, not taking medications currently,  reports she has been kicked oput of her camper that is currently on her sisters property due to losing her job and not having money to pay her sister rent for her camper being on her property. patient reports she has frequent panic attacks, due to her poor health.  patient states she has no family support and \"just feels alone\".     Estimated Length of Stay: 5-7 days  Estimated Discharge Date Update: to be determined by physician    PATIENT STRENGTHS:  Patient Strengths Strengths: Communication, Connection to output provider  Patient Strengths and Limitations:Limitations: Hopeless about future, Apathetic / unmotivated, Tendency to isolate self, General negative or hopeless attitude about future/recovery  Addictive Behavior:Addictive Behavior  In the past 3 months, have you felt or has someone told you that you have a problem with:  : None  Do you have a history of Chemical Use?: No  Do you have a history of Alcohol Use?: No  Do you have a history of Street Drug Abuse?: Yes  Histroy of Prescripton Drug Abuse?: No  Medical Problems:  Past Medical History:   Diagnosis Date    Arthritis     Asthma     COPD (chronic obstructive pulmonary disease) ([facility])     Eczema of face     Hypertension     Psychiatric problem     Restless legs        Risk:  Fall RiskTotal: 65  Irwin Scale Irwin Scale Score: 22  BVC Total: 0  Change in scores no Changes to plan of Care no    Status EXAM:   Status and Exam  Normal: Yes  Facial Expression: Flat  Affect: Appropriate  Level of Consciousness: Alert  Mood:Normal: No  Mood: Depressed  Motor Activity:Normal: Yes  Motor Activity: Increased  Interview Behavior: Cooperative  Preception: [place] to Person, Alvie Claudia to Time, [place] to Place, [place] to Situation  Attention:Normal: Yes  Attention: Distractible  Thought Processes: Circumstantial, Blocking  Thought Content:Normal: Yes  Hallucinations: None  Delusions: No  Memory:Normal: No  Memory: Poor Recent  Insight and Judgment: No  Insight and Judgment: Poor Insight, Unmotivated  Present Suicidal Ideation: No  Present Homicidal Ideation: No    Daily Assessment Last Entry:   Daily Sleep (WDL): Within Defined Limits         Patient Currently in Pain: Yes  Daily Nutrition (WDL): Within Defined Limits    Patient Monitoring:  Frequency of Checks: 4 times per hour, close    Psychiatric Symptoms:   Depression Symptoms  Depression Symptoms: Feelings of helplessness, Feelings of hopelessess, Feelings of worthlessness  Anxiety Symptoms  Anxiety Symptoms: Generalized  Dilcia Symptoms  Dilcia Symptoms: No problems reported or observed. Psychosis Symptoms  Delusion Type: No problems reported or observed. Suicide Risk CSSR-S:  1) Within the past month, have you wished you were dead or wished you could go to sleep and not wake up? : Yes  2) Have you actually had any thoughts of killing yourself? : Yes  3) Have you been thinking about how you might kill yourself? : Yes  5) Have you started to work out or worked out the details of how to kill yourself?  Do you intend to carry out this plan? : Yes  6) Have you ever done anything, started to do anything, or prepared to do anything to end your life?: Yes  Change in Result no Change in Plan of care no      EDUCATION:   EDUCATION:   Learner Progress Toward Treatment Goals: Reviewed results and recommendations of this team, Reviewed group plan and strategies, Reviewed signs, symptoms and risk of self harm and violent behavior, Reviewed goals and plan of care    Method:small group, individual verbal education    Outcome:verbalized by patient, but needs reinforcement to obtain goals    PATIENT GOALS:  Short term: \"Figure out ways to cope,\" use distractions and coping skills to keep from boredom and intrusive thoughts. Long term: Surround self with positive people, \"go to [employer] to make friends. \" Follow up with [facility].     PLAN/TREATMENT RECOMMENDATIONS UPDATE: continue with group therapies, increased socialization, continue planning for after discharge goals, continue with medication compliance    SHORT-TERM GOALS UPDATE:   Time frame for Short-Term Goals: 5-7 days    LONG-TERM GOALS UPDATE:   Time frame for Long-Term Goals: 6 months  Members Present in Team Meeting: See Signature Sheet    Jackie Sutton, MSW, LSW

## 2020-01-27 NOTE — GROUP NOTE
Group Therapy Note    Date: 1/26/2020    Group Start Time: 2050  Group End Time: 2100  Group Topic: Relaxation    [facility]    Hero [facility] RN      Group Therapy Note    Attendees: 9/17      Patient's Goal:  To acquire coping skills by developing relaxation techniques    Notes:  Pt attended and actively participated in the Relaxation group this evening.     Status After Intervention:  Improved    Participation Level: Interactive    Participation Quality: Appropriate and Attentive    Speech:  normal    Thought Process/Content: Logical    Affective Functioning: Congruent    Mood: calm and attempting to relax    Level of consciousness:  Alert and Oriented x4    Response to Learning: Progressing to goal    Endings: None Reported    Modes of Intervention: Education, Support, and Exploration    Discipline Responsible: Registered Nurse        Signature:  [facility] RN

## 2020-01-27 NOTE — PLAN OF CARE
Problem: Pain:  Goal: Pain level will decrease  Description  Pain level will decrease  1/26/2020 2137 by Renaldo Proctor RN  Outcome: Ongoing     Problem: Suicide risk  Goal: Provide patient with safe environment  Description  Provide patient with safe environment  1/26/2020 2137 by Renaldo Proctor RN  Outcome: Ongoing     Problem: Falls - Risk of:  Goal: Will remain free from falls  Description  Will remain free from falls  1/26/2020 2137 by Renaldo Proctor RN  Outcome: Ongoing     Problem: Altered Mood, Depressive Behavior:  Goal: Able to verbalize and/or display a decrease in depressive symptoms  Description  Able to verbalize and/or display a decrease in depressive symptoms  1/26/2020 2137 by Renaldo Proctor RN  Outcome: Ongoing       Patient out socializing with peers, reports no longer having feelings of self harm. Patient reported her depression is due to her current family situation and not having housing or family to support her. Patient is attending groups and states the groups help with her anxiety.   Patient has remained free of physical injury this shift

## 2020-01-27 NOTE — PROGRESS NOTES
Pharmacy Med Education Group Note    Date: 1/27/20  Start Time: 1330  End Time: 9518    Number Participants in Group:  5    Goal:  Patient will demonstrate an understanding of the medications intended purpose and possible adverse effects  Topic: [place] for Pharmacy Med Ed Group    Discipline Responsible:     OT  AT  [facility].  RT     X Other       Participation Level:     None  Minimal      X Active Listener    X Interactive    Monopolizing         Participation Quality:    X Appropriate  Inappropriate     X       Attentive        Intrusive          Sharing        Resistant          Supportive        Lethargic       Affective:     X Congruent  Incongruent  Blunted  Flat    Constricted  Anxious  Elated  Angry    Labile  Depressed  Other         Cognitive:    X Alert  Oriented PPTP     Concentration   X G  F  P   Attention Span   X G  F  P   Short-Term Memory   X G  F  P   Long-Term Memory  G  F  P   ProblemSolving/  Decision Making  G  F  P   Ability to Process  Information   X G  F  P      Contributing Factors             Delusional             Hallucinating             Flight of Ideas             Other:       Modes of Intervention:    X Education   X Support  Exploration    Clarifying  Problem Solving  Confrontation    Socialization  Limit Setting  Reality Testing    Activity  Movement  Media    Other:            Response to Learning:    X Able to verbalize current knowledge/experience    Able to verbalize/acknowledge new learning    Able to retain information    Capable of insight    Able to change behavior    Progressing to goal    Other:        Comments:     Lincoln Rhoades PharmD, BCPS  1/27/2020 2:33 PM

## 2020-01-28 VITALS
RESPIRATION RATE: 14 BRPM | OXYGEN SATURATION: 98 % | HEIGHT: 64 IN | HEART RATE: 86 BPM | WEIGHT: 234 LBS | TEMPERATURE: 98.3 F | BODY MASS INDEX: 39.95 KG/M2 | DIASTOLIC BLOOD PRESSURE: 84 MMHG | SYSTOLIC BLOOD PRESSURE: 123 MMHG

## 2020-01-28 PROCEDURE — 6370000000 HC RX 637 (ALT 250 FOR IP): Performed by: PSYCHIATRY & NEUROLOGY

## 2020-01-28 PROCEDURE — 1240000000 HC EMOTIONAL WELLNESS R&B

## 2020-01-28 PROCEDURE — 99232 SBSQ HOSP IP/OBS MODERATE 35: CPT | Performed by: NURSE PRACTITIONER

## 2020-01-28 PROCEDURE — 6370000000 HC RX 637 (ALT 250 FOR IP): Performed by: NURSE PRACTITIONER

## 2020-01-28 RX ORDER — MELOXICAM 7.5 MG/1
7.5 TABLET ORAL 2 TIMES DAILY WITH MEALS
Status: DISCONTINUED | OUTPATIENT
Start: 2020-01-28 | End: 2020-01-29 | Stop reason: HOSPADM

## 2020-01-28 RX ADMIN — Medication 2 PUFF: at 22:01

## 2020-01-28 RX ADMIN — Medication 2 MG: at 21:37

## 2020-01-28 RX ADMIN — ALBUTEROL SULFATE 2 PUFF: 90 AEROSOL, METERED RESPIRATORY (INHALATION) at 21:38

## 2020-01-28 RX ADMIN — HYDROXYZINE HYDROCHLORIDE 25 MG: 25 TABLET, FILM COATED ORAL at 02:07

## 2020-01-28 RX ADMIN — DICLOFENAC 2 G: 10 GEL TOPICAL at 10:46

## 2020-01-28 RX ADMIN — ROPINIROLE HYDROCHLORIDE 4 MG: 2 TABLET, FILM COATED ORAL at 21:37

## 2020-01-28 RX ADMIN — ARIPIPRAZOLE 5 MG: 5 TABLET ORAL at 10:44

## 2020-01-28 RX ADMIN — MELOXICAM 7.5 MG: 7.5 TABLET ORAL at 10:43

## 2020-01-28 RX ADMIN — SERTRALINE HYDROCHLORIDE 50 MG: 50 TABLET ORAL at 10:44

## 2020-01-28 RX ADMIN — METOPROLOL TARTRATE 25 MG: 25 TABLET ORAL at 21:37

## 2020-01-28 RX ADMIN — PANTOPRAZOLE SODIUM 40 MG: 40 TABLET, DELAYED RELEASE ORAL at 10:43

## 2020-01-28 RX ADMIN — MELOXICAM 7.5 MG: 7.5 TABLET ORAL at 19:01

## 2020-01-28 RX ADMIN — Medication 2 PUFF: at 10:43

## 2020-01-28 RX ADMIN — HYDROCORTISONE: 1 CREAM TOPICAL at 10:46

## 2020-01-28 RX ADMIN — HYDROXYZINE HYDROCHLORIDE 25 MG: 25 TABLET, FILM COATED ORAL at 21:37

## 2020-01-28 RX ADMIN — METOPROLOL TARTRATE 25 MG: 25 TABLET ORAL at 10:44

## 2020-01-28 RX ADMIN — DICLOFENAC 2 G: 10 GEL TOPICAL at 21:38

## 2020-01-28 RX ADMIN — HYDROCORTISONE: 1 CREAM TOPICAL at 21:38

## 2020-01-28 ASSESSMENT — PAIN SCALES - GENERAL
PAINLEVEL_OUTOF10: 7
PAINLEVEL_OUTOF10: 7

## 2020-01-28 NOTE — GROUP NOTE
Group Therapy Note    Date: 1/28/2020    Group Start Time: 1100  Group End Time: 1130  Group Topic: Cognitive Skills    FAVIAN Sears, CTRS    Pt did not attend RT skills group d/t resting in room despite staff invitation to attend. 1:1 talk time offered as alternative to group session, pt declined.

## 2020-01-28 NOTE — CARE COORDINATION
Patient came to social work to inquire about housing options. She states she contacted [facility] and [facility] and they have a top bunk or a mattress on the floor which patient states she cannot stay due to her physical limitations. Patient and social work contacted [facility] in [facility], patient was provided with phone number to get on their wait list as they are not an emergency shelter. Patient was provided with list of shelter options for local [facility], patient declined to accepts [facility], but took the list including [place], [facility], [facility] [address], [place].

## 2020-01-28 NOTE — PLAN OF CARE
Problem: Falls - Risk of:  Goal: Will remain free from falls  Description  Will remain free from falls  1/28/2020 1142 by Audie Hargrove RN  Outcome: Ongoing     Problem: Altered Mood, Depressive Behavior:  Goal: Ability to disclose and discuss suicidal ideas will improve  Description  Ability to disclose and discuss suicidal ideas will improve  1/28/2020 1142 by Audie Hargrove RN  Outcome: Ongoing    Pt denies having homicidal or suicidal thoughts. Pt reports having depression and anxiety due to not having a home. Pt denies having hallucinations. Pt reports having poor sleep due to her roommate wandering around most of the night and fair diet. Pt is compliant with her medical treatment. Pt is cooperative with staff and isolative to self in the day room. Will continue to monitor for changes in condition.

## 2020-01-28 NOTE — PLAN OF CARE
Problem: Falls - Risk of:  Goal: Will remain free from falls  Description  Will remain free from falls  Outcome: Ongoing  Note:   Patient remains free of any falls or injuries at this time. Patient is aware of call light placement and will ask for assistance if needed. 15 minute visual safety checks maintained. Staff will continue to monitor patient and provide support as needed. Problem: Altered Mood, Depressive Behavior:  Goal: Ability to disclose and discuss suicidal ideas will improve  Description  Ability to disclose and discuss suicidal ideas will improve  Outcome: Ongoing  Note:   Patient denies suicidal ideation at this time. No self-harm, falls, or injuries. Patient states she'll contract for safety if thoughts arise. 15 minute visual safety checks maintained. Staff will continue to monitor patient and provide support as needed.

## 2020-01-28 NOTE — PROGRESS NOTES
Department of Psychiatry  Attending Progress Note  Chief Complaint: Bipolar 1 disorder ([facility] [address]     SUBJECTIVE: This is a 45-year-old female being seen for follow-up. The patient was admitted from [facility] [place] due to having worsening depression and \"multiple\" suicide plans to harm herself. The patient's nurse reports that the patient did have an episode of mood lability last evening. She became upset because she does not have anywhere to go once discharged. She was very tearful and angry. She was verbally de-escalated and calmed. She has been attending groups. She has been medication compliant. She received as needed Atarax this morning at 0207. She also received as needed melatonin last evening at 2213. No additional as needed medications required in the past 24 hours. No restraint or seclusion. The patient is seen in the assessment room today. She states that she gets \"irritable but better. \"  She expressed that she is frustrated with her housing situation. She states \"they tell me it is not your job to find me a place to live, but they told me that that would happen here. \"  She does report that overall she feels she is doing better. She states that she is \"dealing with a lot,\" but she is not suicidal \"at all. \"  The patient also reports that she is  seeing things differently than she had been before. She states she has been talking to her sister, but has not asked him to move back in. She reports her sleep was poor but reports that it was related to her roommate. She does feel the melatonin made her more tired and is hopeful she will able to sleep tonight. Currently the patient denies any homicidal ideation and suicidal or self-harm thoughts. Rates her depression a 4 out of 10 on numeric rating scale of 0-10 with 0 being none and 10 the worst.  No psychosis noted. Patient denies any side effects of medications. No acute distress or discomfort.   No additional needs hydroxide (MILK OF MAGNESIA) 400 MG/5ML suspension 30 mL  30 mL Oral Daily PRN Josh Hughes MD        aluminum & magnesium hydroxide-simethicone (MAALOX) 200-200-20 MG/5ML suspension 15 mL  15 mL Oral Q6H PRN Josh Hughes MD        hydrOXYzine (ATARAX) tablet 25 mg  25 mg Oral TID PRN Josh Hughes MD   25 mg at 01/28/20 0207    albuterol (PROVENTIL) nebulizer solution 2.5 mg  2.5 mg Nebulization Q6H PRN Josh Hughes MD        metoprolol tartrate (LOPRESSOR) tablet 25 mg  25 mg Oral BID Josh Hughes MD   25 mg at 01/28/20 1044    pantoprazole (PROTONIX) tablet 40 mg  40 mg Oral Daily Josh Hughes MD   40 mg at 01/28/20 1043    rOPINIRole (REQUIP) tablet 4 mg  4 mg Oral Nightly Josh Hughes MD   4 mg at 01/27/20 2213    mometasone-formoterol (DULERA) 100-5 MCG/ACT inhaler 2 puff  2 puff Inhalation BID Josh Hughes MD   2 puff at 01/28/20 1043    albuterol sulfate  (90 Base) MCG/ACT inhaler 2 puff  2 puff Inhalation Q6H PRN Josh Hughes MD   2 puff at 01/27/20 2211         meloxicam  7.5 mg Oral BID WC    diclofenac sodium  2 g Topical BID    hydrocortisone   Topical BID    sertraline  50 mg Oral Daily    ARIPiprazole  5 mg Oral Daily    metoprolol tartrate  25 mg Oral BID    pantoprazole  40 mg Oral Daily    rOPINIRole  4 mg Oral Nightly    mometasone-formoterol  2 puff Inhalation BID       ASSESSMENT  Bipolar 1 disorder (HCC)     Patient's Response to Treatment: positive    PLAN  · Continue inpatient psychiatric treatment  · Supportive therapy with medication management. Reviewed risks and benefits as well as potential side effects with patient. Continue current orders. · Therapeutic activities and groups  · Follow up at [facility] after symptoms stabilized     Estimated length of stay will be additional 3-5 days.       Electronically signed by AIMEE Boyd CNP on 1/28/2020 at 12:02 PM.    [employer]

## 2020-01-28 NOTE — GROUP NOTE
Group Therapy Note    Date: 1/28/2020    Group Start Time: 1430  Group End Time: 1520  Group Topic: Cognitive Skills    FAVIAN BHISABEL JESSICA    Vicky Forte [place], [place]      Patient's Goal:  To demonstrate increased interpersonal interactions. Notes:  Pt attended and participated in group. Status After Intervention:  Improved    Participation Level:  Active Listener and Interactive    Participation Quality: Appropriate and Attentive      Speech:  normal      Thought Process/Content: Logical      Affective Functioning: Congruent      Mood: euthymic      Level of consciousness:  Alert and Attentive      Response to Learning: Progressing to goal      Endings: None Reported    Modes of Intervention: Socialization, Problem-solving, Activity, Media and Reality-testing      Discipline Responsible: Psychoeducational Specialist      Signature:  Wily Birmingham

## 2020-01-28 NOTE — PROGRESS NOTES
Psychoeducation Group Note     Date: 1/27/20              Start Time: 2030                    End Time: 2050     Number Participants in Group:  8/15     Goal:  Patient will demonstrate increased interpersonal interaction.    Topic:  Wrap-up     Discipline Responsible:    OT   AT    x Nsg.   RT   Other         Participation Level:                   None   Minimal   x Active Listener   Interactive     Monopolizing             Participation Quality:    Appropriate   Inappropriate   x       Attentive         Intrusive    x       Sharing         Resistant           Supportive         Lethargic         Affective:            Congruent   Incongruent   Blunted   Flat     Constricted   Anxious   Elated   Angry     Labile   Depressed   Other   Bright         Cognitive:  x Alert   Oriented PPTP      Concentration   G   F   P   Attention Span   G   F   P   Short-Term Memory   G   F   P   Long-Term Memory   G   F   P   ProblemSolving/  Decision Making   G   F   P   Ability to Process  Information   G   F   P        Contributing Factors              Delusional              Hallucinating              Flight of Ideas              Other:         Modes of Intervention:    Education   Support   Exploration     Clarifying   Problem Solving   Confrontation     Socialization   Limit Setting   Reality Testing     Activity   Movement   Media     Other:                  Response to Learning:    Able to verbalize current knowledge/experience     Able to verbalize/acknowledge new learning     Able to retain information     Capable of insight     Able to change behavior     Progressing to goal     Other:          Comments: Patient participated appropriately.

## 2020-01-28 NOTE — GROUP NOTE
Group Therapy Note    Date: 1/28/2020    Group Start Time: 1000  Group End Time: 0210  Group Topic: Group Therapy    [facility]    Magdalena Yoon MSW, LSW        Group Therapy Note    Attendees: 5/15         Patient's Goal:  Increase interpersonal relationship skills    Notes:  Patient was an active participant in group discussion      Status After Intervention:  Unchanged    Participation Level:  Active Listener and Interactive    Participation Quality: Appropriate, Attentive, Sharing and Supportive      Speech:  normal      Thought Process/Content: Logical      Affective Functioning: Congruent      Mood: anxious and depressed      Level of consciousness:  Alert, Oriented x4 and Attentive      Response to Learning: Progressing to goal      Endings: None Reported    Modes of Intervention: Support, Socialization, Exploration and Clarifying      Discipline Responsible: /Counselor      Signature:  Magdalena Yoon MSW, LSW

## 2020-01-29 PROCEDURE — 6370000000 HC RX 637 (ALT 250 FOR IP): Performed by: NURSE PRACTITIONER

## 2020-01-29 PROCEDURE — 6370000000 HC RX 637 (ALT 250 FOR IP): Performed by: PSYCHIATRY & NEUROLOGY

## 2020-01-29 PROCEDURE — 5130000000 HC BRIDGE APPOINTMENT

## 2020-01-29 PROCEDURE — 99239 HOSP IP/OBS DSCHRG MGMT >30: CPT | Performed by: NURSE PRACTITIONER

## 2020-01-29 RX ORDER — ARIPIPRAZOLE 5 MG/1
5 TABLET ORAL DAILY
Qty: 15 TABLET | Refills: 0 | Status: SHIPPED | OUTPATIENT
Start: 2020-01-30 | End: 2022-02-24 | Stop reason: ALTCHOICE

## 2020-01-29 RX ORDER — DIAPER,BRIEF,INFANT-TODD,DISP
EACH MISCELLANEOUS
Qty: 1 TUBE | Refills: 1 | Status: SHIPPED | OUTPATIENT
Start: 2020-01-29 | End: 2020-02-01

## 2020-01-29 RX ORDER — MELOXICAM 7.5 MG/1
7.5 TABLET ORAL 2 TIMES DAILY WITH MEALS
Qty: 30 TABLET | Refills: 0 | Status: SHIPPED | OUTPATIENT
Start: 2020-01-29 | End: 2022-02-24 | Stop reason: ALTCHOICE

## 2020-01-29 RX ORDER — HYDROXYZINE HYDROCHLORIDE 25 MG/1
25 TABLET, FILM COATED ORAL 3 TIMES DAILY PRN
Qty: 30 TABLET | Refills: 0 | Status: SHIPPED | OUTPATIENT
Start: 2020-01-29 | End: 2020-02-13

## 2020-01-29 RX ADMIN — HYDROXYZINE HYDROCHLORIDE 25 MG: 25 TABLET, FILM COATED ORAL at 11:45

## 2020-01-29 RX ADMIN — ARIPIPRAZOLE 5 MG: 5 TABLET ORAL at 08:38

## 2020-01-29 RX ADMIN — MELOXICAM 7.5 MG: 7.5 TABLET ORAL at 08:38

## 2020-01-29 RX ADMIN — SERTRALINE HYDROCHLORIDE 50 MG: 50 TABLET ORAL at 08:38

## 2020-01-29 RX ADMIN — DICLOFENAC 2 G: 10 GEL TOPICAL at 08:41

## 2020-01-29 RX ADMIN — METOPROLOL TARTRATE 25 MG: 25 TABLET ORAL at 08:38

## 2020-01-29 RX ADMIN — PANTOPRAZOLE SODIUM 40 MG: 40 TABLET, DELAYED RELEASE ORAL at 08:38

## 2020-01-29 RX ADMIN — MELOXICAM 7.5 MG: 7.5 TABLET ORAL at 17:53

## 2020-01-29 RX ADMIN — HYDROXYZINE HYDROCHLORIDE 25 MG: 25 TABLET, FILM COATED ORAL at 02:03

## 2020-01-29 RX ADMIN — Medication 2 PUFF: at 08:37

## 2020-01-29 ASSESSMENT — PAIN SCALES - GENERAL
PAINLEVEL_OUTOF10: 0
PAINLEVEL_OUTOF10: 2

## 2020-01-29 NOTE — CARE COORDINATION
Bridge Appointment completed: Reviewed Discharge Instructions with patient. Patient verbalizes understanding and agreement with the discharge plan using the teachback method.        Discharge Arrangements: [address] notified: N/A  Discharge destination/address: sister's home [phone] [facility]  [facility]

## 2020-01-29 NOTE — GROUP NOTE
Group Therapy Note    Date: 1/29/2020    Group Start Time: 1430  Group End Time: 7683  Group Topic: Recovery    FAVIAN LOPEZ    NITIN Lopez LSW        Group Therapy Note    Attendees: 9/15    patient refused to attend Recovery group at 1430 after encouragement from staff.          Signature:  NITIN Lopez LSW HPI:  80 male history of COPD on 2LNC at home, CAD, DM 2, HTN, HLD, PVD started feeling unwell 2 days ago. His wife attributed it to COPD as at night patient had found that his O2 had slipped out of his nose. She took him to the vascular attending office where they found he was tachycardic and hypotensive and sent him to the Emergency Department. He later developed substernal, pressure-like, non-radiating, improved with O2 on.   BP was 98/60 and HR 130s so sent to Emergency Department. Seen by outpatient cardiology attending who diagnosed sinus tach. No lightheadedness or chest pain or shortness of breath. Currently subjectively feels much improved. (11 Jun 2018 17:30)  Patient has history of diabetes, on oral meds at home, no recent hypoglycemic episodes, no polyuria polydipsia. Patient follows up with PCP for diabetes management.    PAST MEDICAL & SURGICAL HISTORY:  PVD (peripheral vascular disease)  Hypertension  COPD (chronic obstructive pulmonary disease)  Osteomyelitis  Dyslipidemia  CAD (Coronary Artery Disease)  Diabetes Mellitus, Type II  CABG (Coronary Artery Bypass Graft)      FAMILY HISTORY:  Family history of diabetes mellitus (Sibling)      Social History:    Outpatient Medications:    MEDICATIONS  (STANDING):  aspirin enteric coated 81 milliGRAM(s) Oral daily  atorvastatin 40 milliGRAM(s) Oral at bedtime  azithromycin   Tablet 500 milliGRAM(s) Oral daily  buDESOnide 160 MICROgram(s)/formoterol 4.5 MICROgram(s) Inhaler 2 Puff(s) Inhalation two times a day  cefTRIAXone   IVPB 1 Gram(s) IV Intermittent every 24 hours  clopidogrel Tablet 75 milliGRAM(s) Oral daily  dextrose 5%. 1000 milliLiter(s) (50 mL/Hr) IV Continuous <Continuous>  dextrose 5%. 1000 milliLiter(s) (50 mL/Hr) IV Continuous <Continuous>  dextrose 50% Injectable 12.5 Gram(s) IV Push once  dextrose 50% Injectable 25 Gram(s) IV Push once  dextrose 50% Injectable 25 Gram(s) IV Push once  dextrose 50% Injectable 12.5 Gram(s) IV Push once  dextrose 50% Injectable 25 Gram(s) IV Push once  dextrose 50% Injectable 25 Gram(s) IV Push once  heparin  Injectable 5000 Unit(s) SubCutaneous every 12 hours  insulin glargine Injectable (LANTUS) 16 Unit(s) SubCutaneous at bedtime  insulin lispro (HumaLOG) corrective regimen sliding scale   SubCutaneous three times a day before meals  insulin lispro (HumaLOG) corrective regimen sliding scale   SubCutaneous at bedtime  insulin lispro Injectable (HumaLOG) 7 Unit(s) SubCutaneous three times a day before meals  multivitamin/minerals 1 Tablet(s) Oral daily  tamsulosin 0.4 milliGRAM(s) Oral at bedtime    MEDICATIONS  (PRN):  dextrose 40% Gel 15 Gram(s) Oral once PRN Blood Glucose LESS THAN 70 milliGRAM(s)/deciliter  dextrose 40% Gel 15 Gram(s) Oral once PRN Blood Glucose LESS THAN 70 milliGRAM(s)/deciliter  glucagon  Injectable 1 milliGRAM(s) IntraMuscular once PRN Glucose LESS THAN 70 milligrams/deciliter  glucagon  Injectable 1 milliGRAM(s) IntraMuscular once PRN Glucose LESS THAN 70 milligrams/deciliter      Allergies    No Known Allergies    Intolerances    shellfish (Nausea)    Review of Systems:  Constitutional: No fever, no chills  Eyes: No blurry vision  Neuro: No tremors  HEENT: No pain, no neck swelling  Cardiovascular: No chest pain, no palpitations  Respiratory: Has SOB, no cough  GI: No nausea, vomiting, abdominal pain  : No dysuria  Skin: no rash  MSK: Has leg swelling.  Psych: no depression  Endocrine: no polyuria, polydipsia    ALL OTHER SYSTEMS REVIEWED AND NEGATIVE    UNABLE TO OBTAIN    PHYSICAL EXAM:  VITALS: T(C): 36.9 (06-12-18 @ 19:57)  T(F): 98.4 (06-12-18 @ 19:57), Max: 98.4 (06-12-18 @ 19:57)  HR: 91 (06-12-18 @ 19:57) (87 - 99)  BP: 108/70 (06-12-18 @ 19:57) (108/70 - 133/77)  RR:  (18 - 20)  SpO2:  (94% - 100%)  Wt(kg): --  GENERAL: NAD, well-groomed, well-developed  EYES: No proptosis, no lid lag  HEENT:  Atraumatic, Normocephalic  THYROID: Normal size, no palpable nodules  RESPIRATORY: Clear to auscultation bilaterally; No rales, rhonchi, wheezing  CARDIOVASCULAR: Si S2, No murmurs;  GI: Soft, non distended, normal bowel sounds  SKIN: Dry, intact, No rashes or lesions  MUSCULOSKELETAL: Has BL lower extremity edema.  NEURO:  no tremor, sensation decreased in feet BL,    POCT Blood Glucose.: 195 mg/dL (06-12-18 @ 16:39)  POCT Blood Glucose.: 331 mg/dL (06-12-18 @ 12:43)  POCT Blood Glucose.: 269 mg/dL (06-12-18 @ 08:32)  POCT Blood Glucose.: 371 mg/dL (06-11-18 @ 21:48)                            11.3   10.14 )-----------( 267      ( 12 Jun 2018 07:50 )             37.4       06-12    138  |  97  |  33<H>  ----------------------------<  366<H>  4.8   |  28  |  1.54<H>    EGFR if : 49<L>  EGFR if non : 42<L>    Ca    9.4      06-12    TPro  7.8  /  Alb  3.7  /  TBili  0.6  /  DBili  x   /  AST  36  /  ALT  15  /  AlkPhos  101  06-11      Thyroid Function Tests:  06-12 @ 07:58 TSH 0.43 FreeT4 -- T3 -- Anti TPO -- Anti Thyroglobulin Ab -- TSI --      Hemoglobin A1C, Whole Blood: 6.2 % <H> [4.0 - 5.6] (06-12-18 @ 07:50)          Radiology:

## 2020-01-29 NOTE — GROUP NOTE
Group Therapy Note    Date: 1/29/2020    Group Start Time: 1100  Group End Time: 1130  Group Topic: Psychoeducation    [facility]    [facility], [facility]      Patient's Goal:  To demonstrate increased interpersonal interaction. Notes:  Pt attended and participated in group. Status After Intervention:  Improved    Participation Level:  Active Listener and Interactive    Participation Quality: Appropriate and Attentive      Speech:  normal      Thought Process/Content: Logical      Affective Functioning: Congruent      Mood: euthymic      Level of consciousness:  Alert and Attentive      Response to Learning: Progressing to goal      Endings: None Reported    Modes of Intervention: Socialization, Problem-solving, Media and Reality-testing      Discipline Responsible: Psychoeducational Specialist      Signature:  Aditi Bonilla

## 2020-01-29 NOTE — GROUP NOTE
Group Therapy Note    Date: 1/29/2020    Group Start Time: 0845  Group End Time: 0900  Group Topic: Community Meeting    FAVIAN ASTUDILLO [place], [place]      Patient's Goal:  To demonstrate increased interpersonal interaction. Notes: Pt attended and participated in group. Status After Intervention:  Improved    Participation Level:  Active Listener and Interactive    Participation Quality: Appropriate and Attentive      Speech:  normal      Thought Process/Content: Logical      Affective Functioning: Congruent      Mood: euthymic       Level of consciousness:  Alert and Attentive      Response to Learning: Progressing to goal      Endings: None Reported    Modes of Intervention: Education, Support, Socialization, Problem-solving and Reality-testing      Discipline Responsible: Psychoeducational Specialist      Signature:  Vinnie Drake

## 2020-01-29 NOTE — GROUP NOTE
Group Therapy Note    Date: 1/29/2020    Group Start Time: 1330  Group End Time: 1400  Group Topic: Psychoeducation    FAVIAN Sears, [facility]    Pt did not attend RT skills group d/t resting in room despite staff invitation to attend. 1:1 talk time offered as alternative to group session, pt declined.

## 2020-01-29 NOTE — GROUP NOTE
Group Therapy Note    Date: 1/29/2020    Group Start Time: 1000  Group End Time: 7408  Group Topic: Psychotherapy    SHIN Anderson        Group Therapy Note    Attendees: 7/15     Patient's Goal:  Increase interpersonal relationships and express emotions adequately     Notes:  Patient shared personal experiences     Status After Intervention: Unchanged     Participation Level:  Active Listener and Interactive     Participation Quality: Appropriate, Attentive and Sharing     Speech:  Normal     Thought Process/Content: Logical     Affective Functioning: Congruent     Mood: Euthymic     Level of consciousness:  Alert and Oriented x4     Response to Learning: Able to verbalize current knowledge/experience, Able to retain information and Capable of insight     Endings: None Reported     Modes of Intervention: Education, Socialization and Exploration     Discipline Responsible: /Counselor     Signature:  SHIN Salgado

## 2020-01-29 NOTE — DISCHARGE SUMMARY
DISCHARGE SUMMARY  Patient ID:  Chance Nunez  [id number]  57 y.o.  1965    Admit date: 1/25/2020    Discharge date and time: 1/29/2020  1:21 PM     Admitting Physician: Mick Cassidy MD     Discharge Physician:  Tom De Dios APRN - CNP    Admission Diagnoses: Bipolar 1 disorder [facility]) [F31.9]    Discharge Diagnoses:   Bipolar 1 disorder [facility])     Patient Active Problem List   Diagnosis Code    Bipolar 1 disorder ([facility] [address] F31.9        Admission Condition: poor    Discharged Condition: stable    Indication for Admission: threat to self    History of Present Illnes (present tense wording indicates findings from admission exam on 1/25/2020 and are not necessarily indicative of current findings): This is a 81-year-old female who was admitted for the purpose of safety and stabilization. The patient was admitted from [facility] due to worsening depression accompanied by \"multiple\" suicide plans. She had also been noncompliant with psychiatric medications and recommended treatments. Given the patient's elevated risk of harm to self and limited connection to the [address], the patient remain hospitalized as level of care necessary for safety and stabilization was greater than that which can be provided on the outpatient basis. Hospital Course:   Upon admission, Tanisha Suero was provided a safe secure environment, introduced to unit milieu. Patient participated in groups and individual therapies. Meds were adjusted. After few days of hospital care, patient began to feel improvement. Depression lifted, thoughts to harm self ceased. Sleep improved, appetite was good. On morning rounds 1/29/2020, patient endorsed feeling ready for discharge. Patient denies suicidal or homicidal ideations, denies hallucinations or delusions. Denies SE's from meds.   It was decided that pt had achieved maximum benefit from hospital care and can be discharged     Significant Diagnostic

## 2020-01-29 NOTE — DISCHARGE INSTR - PHARMACY
reaction: hives; difficult breathing; swelling of your face, lips, tongue, or throat. Call your doctor at once if you have:  · severe agitation, distress, or restless feeling;  · twitching or uncontrollable movements of your eyes, lips, tongue, face, arms, or legs;  · mask-like appearance of the face, trouble swallowing, problems with speech;  · seizure (convulsions);  · thoughts about suicide or hurting yourself;  · severe nervous system reaction --very stiff (rigid) muscles, high fever, sweating, confusion, fast or uneven heartbeats, tremors, feeling like you might pass out;  · low blood cell counts --fever, chills, sore throat, mouth sores, skin sores, sore throat, cough, trouble breathing, feeling light-headed; or  · high blood sugar --increased thirst, increased urination, dry mouth, fruity breath odor. You may have increased sexual urges, unusual urges to zelaya, or other intense urges while taking this medicine. Talk with your doctor if this occurs. Common side effects may include:  · uncontrolled muscle movements, anxiety, feeling restless;  · weight gain;  · nausea, vomiting, constipation;  · increased appetite;  · headache, dizziness, drowsiness, feeling tired;  · sleep problems (insomnia); or  · stuffy nose, sore throat. This is not a complete list of side effects and others may occur. Call your doctor for medical advice about side effects. You may report side effects to FDA at 8-438-FDA-1716. What other drugs will affect aripiprazole? Taking aripiprazole with other drugs that make you drowsy or slow your breathing can cause dangerous side effects or death. Ask your doctor before using opioid medication, a sleeping pill, a muscle relaxer, or medicine for anxiety or seizures. Many drugs can affect aripiprazole. This includes prescription and over-the-counter medicines, vitamins, and herbal products. Not all possible interactions are listed here.  Tell your doctor about all your current medicines and any medicine you start or stop using. Where can I get more information? Your pharmacist can provide more information about aripiprazole. Remember, keep this and all other medicines out of the reach of children, never share your medicines with others, and use this medication only for the indication prescribed. Every effort has been made to ensure that the information provided by [address] is accurate, up-to-date, and complete, but no guarantee is made to that effect. Drug information contained herein may be time sensitive. [facility] information has been compiled for use by healthcare practitioners and consumers in the [place] and therefore [facility] does not warrant that uses outside of the [place] are appropriate, unless specifically indicated otherwise. [facility]'s drug information does not endorse drugs, diagnose patients or recommend therapy. [facility]'s drug information is an informational resource designed to assist licensed healthcare practitioners in caring for their patients and/or to serve consumers viewing this service as a supplement to, and not a substitute for, the expertise, skill, knowledge and judgment of healthcare practitioners. The absence of a warning for a given drug or drug combination in no way should be construed to indicate that the drug or drug combination is safe, effective or appropriate for any given patient. [facility] does not assume any responsibility for any aspect of healthcare administered with the aid of information [facility] provides. The information contained herein is not intended to cover all possible uses, directions, precautions, warnings, drug interactions, allergic reactions, or adverse effects. If you have questions about the drugs you are taking, check with your doctor, nurse or pharmacist.  Copyright 7248-2225 [address]: 12.01. Revision date: 8/22/2019. Care instructions adapted under license by [facility] [place] ([facility]).  If you have questions about a medical condition or this instruction, always ask your healthcare professional. [address] any warranty or liability for your use of this information. hydroxyzine  Pronunciation:  elsie DROX ee mary  Brand:  Vistaril  What is the most important information I should know about hydroxyzine? You should not use hydroxyzine if you are pregnant, especially during the first or second trimester. Hydroxyzine can cause a serious heart problem, especially if you use certain medicines at the same time. Tell your doctor about all your current medicines and any you start or stop using. What is hydroxyzine? Hydroxyzine reduces activity in the central nervous system. It also acts as an antihistamine that reduces the effects of natural chemical histamine in the body. Histamine can produce symptoms of itching, or hives on the skin. Hydroxyzine is used as a sedative to treat anxiety and tension. It is also used together with other medications given during and after general anesthesia. Hydroxyzine is also used to treat allergic skin reactions such as hives or contact dermatitis. Hydroxyzine may also be used for purposes not listed in this medication guide. What should I discuss with my healthcare provider before taking hydroxyzine? You should not use hydroxyzine if you are allergic to it, or if:  · you have long QT syndrome;  · you are allergic to cetirizine (Zyrtec) or levocetirizine (Xyzal); or  · you are in the first trimester of pregnancy. You should not use hydroxyzine if you are pregnant, especially during the first or second trimester. Hydroxyzine could harm the unborn baby or cause birth defects. Use effective birth control to prevent pregnancy while you are using this medicine.   To make sure hydroxyzine is safe for you, tell your doctor if you have:  · blockage in your digestive tract (stomach or intestines);  · bladder obstruction or other urination allergic reaction:  hives; difficult breathing; swelling of your face, lips, tongue, or throat. In rare cases, hydroxyzine may cause a severe skin reaction. Stop taking this medicine and call your doctor right away if you have sudden skin redness or a rash that spreads and causes white or yellow pustules, blistering, or peeling. Stop using hydroxyzine and call your doctor at once if you have:  · fast or pounding heartbeats;  · headache with chest pain;  · severe dizziness, fainting; or  · a seizure (convulsions). Side effects such as drowsiness and confusion may be more likely in older adults. Common side effects may include:  · drowsiness;  · headache;  · dry mouth; or  · skin rash. This is not a complete list of side effects and others may occur. Call your doctor for medical advice about side effects. You may report side effects to FDA at 3-089-FDA-2410. What other drugs will affect hydroxyzine? Taking this medicine with other drugs that make you sleepy can worsen this effect. Ask your doctor before taking hydroxyzine with a sleeping pill, narcotic pain medicine, muscle relaxer, or medicine for anxiety, depression, or seizures. Hydroxyzine can cause a serious heart problem, especially if you use certain medicines at the same time, including antibiotics, antidepressants, heart rhythm medicine, antipsychotic medicines, and medicines to treat cancer, malaria, HIV or AIDS. Tell your doctor about all medicines you use, and those you start or stop using during your treatment with hydroxyzine. Other drugs may interact with hydroxyzine, including prescription and over-the-counter medicines, vitamins, and herbal products. Not all possible interactions are listed here. Tell each of your health care providers about all medicines you use now and any medicine you start or stop using. Where can I get more information? Your pharmacist can provide more information about hydroxyzine.   Remember, keep this and all other medicines out of the reach of children, never share your medicines with others, and use this medication only for the indication prescribed. Every effort has been made to ensure that the information provided by [address] is accurate, up-to-date, and complete, but no guarantee is made to that effect. Drug information contained herein may be time sensitive. [facility] information has been compiled for use by healthcare practitioners and consumers in the [place] and therefore [facility] does not warrant that uses outside of the [place] are appropriate, unless specifically indicated otherwise. [facility]'s drug information does not endorse drugs, diagnose patients or recommend therapy. [facility]'s drug information is an informational resource designed to assist licensed healthcare practitioners in caring for their patients and/or to serve consumers viewing this service as a supplement to, and not a substitute for, the expertise, skill, knowledge and judgment of healthcare practitioners. The absence of a warning for a given drug or drug combination in no way should be construed to indicate that the drug or drug combination is safe, effective or appropriate for any given patient. [facility] does not assume any responsibility for any aspect of healthcare administered with the aid of information [facility] provides. The information contained herein is not intended to cover all possible uses, directions, precautions, warnings, drug interactions, allergic reactions, or adverse effects. If you have questions about the drugs you are taking, check with your doctor, nurse or pharmacist.  Copyright 1853-0207 [address] Version: 8.01. Revision date: 3/15/2017. Care instructions adapted under license by [facility] ([facility]).  If you have questions about a medical condition or this instruction, always ask your healthcare professional. [place] [address] any warranty or liability for your use of this

## 2020-01-29 NOTE — PLAN OF CARE
Patient denies suicidal and homicidal ideation at this time and agrees to seek staff if thoughts of self-harm arise. Patient is medication compliant. Patient is free from falls and physical injury. Safe environment is maintained. Patient admits to depression and anxiety. Staff will continue to provide support and reassurance as needed. Safety checks continue every 15 minutes.

## 2020-01-30 NOTE — CARE COORDINATION
Name: Katherin Packer    : 1965    Discharge Date: 20    Primary Auth/Cert #: [id number]    Discharged to sister's house     Discharge Medications:      Medication List      START taking these medications    ARIPiprazole 5 MG tablet  Commonly known as:  ABILIFY  Take 1 tablet by mouth daily for 15 days  Notes to patient:  Mental Clarity/Mood     hydrocortisone 1 % cream  Apply topically 2 times daily. Notes to patient:  Skin Irritation     hydrOXYzine 25 MG tablet  Commonly known as:  ATARAX  Take 1 tablet by mouth 3 times daily as needed for Anxiety  Notes to patient:  Anxiety     melatonin ER 1 MG Tbcr tablet  Take 2 tablets by mouth nightly  Notes to patient:  Sleep     meloxicam 7.5 MG tablet  Commonly known as:  MOBIC  Take 1 tablet by mouth 2 times daily (with meals) for 15 days  Notes to patient:  Pain        CHANGE how you take these medications    sertraline 50 MG tablet  Commonly known as:  ZOLOFT  Take 1 tablet by mouth daily for 15 days  What changed:    · medication strength  · how much to take  Notes to patient:  Mood        CONTINUE taking these medications    metoprolol tartrate 25 MG tablet  Commonly known as:  LOPRESSOR  Notes to patient:  Heart Health     pantoprazole 40 MG tablet  Commonly known as:  PROTONIX  Notes to patient:  Stomach     rOPINIRole 2 MG tablet  Commonly known as:  REQUIP  Notes to patient:  Sleep/Restlessness     Symbicort 80-4.5 MCG/ACT Aero  Generic drug:  budesonide-formoterol  Notes to patient:  Breathing      * albuterol (2.5 MG/3ML) 0.083% nebulizer solution  Commonly known as:  PROVENTIL  Notes to patient:  Breathing      * Ventolin  (90 Base) MCG/ACT inhaler  Generic drug:  albuterol sulfate HFA  Notes to patient:  Breathing         * This list has 2 medication(s) that are the same as other medications prescribed for you. Read the directions carefully, and ask your doctor or other care provider to review them with you.             STOP taking these medications    celecoxib 200 MG capsule  Commonly known as:  CeleBREX  Notes to patient:  Pain/inflammation     cyclobenzaprine 10 MG tablet  Commonly known as:  FLEXERIL  Notes to patient:  Muscle stiffness     LORazepam 0.5 MG tablet  Commonly known as:  ATIVAN  Notes to patient:  Anxiety      ondansetron 4 MG tablet  Commonly known as:  ZOFRAN  Notes to patient:  Nausea      pregabalin 75 MG capsule  Commonly known as:  Lyrica  Notes to patient:  Pain      RA Acetaminophen Ex St 500 MG tablet  Generic drug:  acetaminophen  Notes to patient:  Pain      traMADol 50 MG tablet  Commonly known as:  ULTRAM  Notes to patient:  Pain      traZODone 100 MG tablet  Commonly known as:  DESYREL  Notes to patient:  Sleep           Where to Get Your Medications      These medications were sent to [address] [place] [address], [address], [id number]    Phone:  [phone]   · ARIPiprazole 5 MG tablet  · hydrocortisone 1 % cream  · hydrOXYzine 25 MG tablet  · meloxicam 7.5 MG tablet  · sertraline 50 MG tablet     You can get these medications from any pharmacy    You don't need a prescription for these medications  · melatonin ER 1 MG Tbcr tablet      Pharmacy Instructions:           aripiprazole  Pronunciation:  AR i PIP basilia wiggins  Brand:  Abilify, Abilify Discmelt  What is the most important information I should know about aripiprazole? Aripiprazole may increase the risk of death in older adults with dementia-related conditions and is not approved for this use. Some people have thoughts about suicide while taking aripiprazole. Stay alert to changes in your mood or symptoms. Report any new or worsening symptoms to your doctor. Do not stop using aripiprazole suddenly, or you could have unpleasant withdrawal symptoms. What is aripiprazole?   Aripiprazole is an antipsychotic medicine that is used to treat the symptoms of psychotic conditions such as schizophrenia and bipolar I disorder (manic depression). It is not known if aripiprazole is safe or effective in children younger than 15 with schizophrenia, or children younger than 10 with bipolar disorder. Aripiprazole is also used together with other medicines to treat major depressive disorder in adults. Aripiprazole is also used in children 6 years or older who have Tourette's disorder, or symptoms of autistic disorder (irritability, aggression, mood swings, temper tantrums, and self-injury). Aripiprazole may also be used for purposes not listed in this medication guide. What should I discuss with my healthcare provider before taking aripiprazole? You should not take aripiprazole if you are allergic to it. Aripiprazole may increase the risk of death in older adults with dementia-related conditions and is not approved for this use. Tell your doctor if you have ever had:  · liver or kidney disease;  · heart disease, high or low blood pressure, heart rhythm problems;  · high cholesterol or triglycerides (a type of fat in the blood);  · low white blood cell (WBC) counts;  · a heart attack or stroke;  · seizures or epilepsy;  · trouble swallowing;  · diabetes (in you or a family member); or  · obsessive-compulsive disorder, impulse-control disorder, or addictive behaviors. Some people have thoughts about suicide while taking aripiprazole. Your doctor will need to check your progress at regular visits. Your family or other caregivers should also be alert to changes in your mood or symptoms. The liquid form (oral solution) of this medication may contain up to 15 grams of sugar per dose. Before taking aripiprazole oral solution, tell your doctor if you have diabetes. Aripiprazole may cause you to have high blood sugar (hyperglycemia). If you are diabetic, check your blood sugar levels on a regular basis while you are taking aripiprazole.   The orally disintegrating tablet form of this medication may contain over 3 milligrams of phenylalanine per tablet. Before taking Abilify Discmelt, tell your doctor if you have phenylketonuria. Taking antipsychotic medicine in the last 3 months of pregnancy may cause problems in the , such as withdrawal symptoms, breathing problems, feeding problems, fussiness, tremors, and limp or stiff muscles. However, you may have withdrawal symptoms or other problems if you stop taking your medicine during pregnancy. If you become pregnant, do not stop taking aripiprazole without your doctor's advice. If you are pregnant, your name may be listed on a pregnancy registry to track the effects of aripiprazole on the baby. It may not be safe to breastfeed while using this medicine. Ask your doctor about any risk. How should I take aripiprazole? Follow all directions on your prescription label and read all medication guides or instruction sheets. Your doctor may occasionally change your dose. Use the medicine exactly as directed. Do not take aripiprazole for longer than 6 weeks unless your doctor has told you to. Aripiprazole can be taken with or without food. Swallow the regular tablet  whole and do not crush, chew, or break it. Measure liquid medicine carefully. Use the dosing syringe provided, or use a medicine dose-measuring device (not a kitchen spoon). Remove an orally disintegrating tablet from the package only when you are ready to take the medicine. Place the tablet in your mouth and allow it to dissolve, without chewing. Swallow several times as the tablet dissolves. If desired, you may drink liquid to help swallow the dissolved tablet. Do not stop using aripiprazole suddenly, or you could have unpleasant withdrawal symptoms. Ask your doctor how to safely stop using this medicine. Your doctor will need to check your progress on a regular basis. Store at room temperature away from moisture and heat.  Aripiprazole liquid may be used for up to 6 months cough, trouble breathing, feeling light-headed; or  · high blood sugar --increased thirst, increased urination, dry mouth, fruity breath odor. You may have increased sexual urges, unusual urges to zelaya, or other intense urges while taking this medicine. Talk with your doctor if this occurs. Common side effects may include:  · uncontrolled muscle movements, anxiety, feeling restless;  · weight gain;  · nausea, vomiting, constipation;  · increased appetite;  · headache, dizziness, drowsiness, feeling tired;  · sleep problems (insomnia); or  · stuffy nose, sore throat. This is not a complete list of side effects and others may occur. Call your doctor for medical advice about side effects. You may report side effects to FDA at 2-445-FDA-3451. What other drugs will affect aripiprazole? Taking aripiprazole with other drugs that make you drowsy or slow your breathing can cause dangerous side effects or death. Ask your doctor before using opioid medication, a sleeping pill, a muscle relaxer, or medicine for anxiety or seizures. Many drugs can affect aripiprazole. This includes prescription and over-the-counter medicines, vitamins, and herbal products. Not all possible interactions are listed here. Tell your doctor about all your current medicines and any medicine you start or stop using. Where can I get more information? Your pharmacist can provide more information about aripiprazole. Remember, keep this and all other medicines out of the reach of children, never share your medicines with others, and use this medication only for the indication prescribed. Every effort has been made to ensure that the information provided by [address] is accurate, up-to-date, and complete, but no guarantee is made to that effect. Drug information contained herein may be time sensitive.  [employer] information has been compiled for use by healthcare practitioners and consumers in the [place] and therefore the central nervous system. It also acts as an antihistamine that reduces the effects of natural chemical histamine in the body. Histamine can produce symptoms of itching, or hives on the skin. Hydroxyzine is used as a sedative to treat anxiety and tension. It is also used together with other medications given during and after general anesthesia. Hydroxyzine is also used to treat allergic skin reactions such as hives or contact dermatitis. Hydroxyzine may also be used for purposes not listed in this medication guide. What should I discuss with my healthcare provider before taking hydroxyzine? You should not use hydroxyzine if you are allergic to it, or if:  · you have long QT syndrome;  · you are allergic to cetirizine (Zyrtec) or levocetirizine (Xyzal); or  · you are in the first trimester of pregnancy. You should not use hydroxyzine if you are pregnant, especially during the first or second trimester. Hydroxyzine could harm the unborn baby or cause birth defects. Use effective birth control to prevent pregnancy while you are using this medicine. To make sure hydroxyzine is safe for you, tell your doctor if you have:  · blockage in your digestive tract (stomach or intestines);  · bladder obstruction or other urination problems;  · glaucoma;  · heart disease, slow heartbeats;  · personal or family history of long QT syndrome;  · an electrolyte imbalance (such as high or low levels of potassium in your blood);  · if you have recently had a heart attack. It is not known whether hydroxyzine passes into breast milk or if it could harm a nursing baby. You should not breast-feed while using this medicine. Do not give this medicine to a child without medical advice. How should I take hydroxyzine? Follow all directions on your prescription label. Your doctor may occasionally change your dose. Do not use this medicine in larger or smaller amounts or for longer than recommended.   Shake the oral suspension (liquid) not a complete list of side effects and others may occur. Call your doctor for medical advice about side effects. You may report side effects to FDA at 8-107-NBI-3835. What other drugs will affect hydroxyzine? Taking this medicine with other drugs that make you sleepy can worsen this effect. Ask your doctor before taking hydroxyzine with a sleeping pill, narcotic pain medicine, muscle relaxer, or medicine for anxiety, depression, or seizures. Hydroxyzine can cause a serious heart problem, especially if you use certain medicines at the same time, including antibiotics, antidepressants, heart rhythm medicine, antipsychotic medicines, and medicines to treat cancer, malaria, HIV or AIDS. Tell your doctor about all medicines you use, and those you start or stop using during your treatment with hydroxyzine. Other drugs may interact with hydroxyzine, including prescription and over-the-counter medicines, vitamins, and herbal products. Not all possible interactions are listed here. Tell each of your health care providers about all medicines you use now and any medicine you start or stop using. Where can I get more information? Your pharmacist can provide more information about hydroxyzine. Remember, keep this and all other medicines out of the reach of children, never share your medicines with others, and use this medication only for the indication prescribed. Every effort has been made to ensure that the information provided by [address] is accurate, up-to-date, and complete, but no guarantee is made to that effect. Drug information contained herein may be time sensitive. [facility] information has been compiled for use by healthcare practitioners and consumers in the [place] and therefore [facility] does not warrant that uses outside of the [place] are appropriate, unless specifically indicated otherwise.  [facility] drug information does not endorse drugs, diagnose patients or recommend

## 2022-02-24 ENCOUNTER — OFFICE VISIT (OUTPATIENT)
Dept: OBGYN CLINIC | Age: 57
End: 2022-02-24
Payer: MEDICARE

## 2022-02-24 ENCOUNTER — HOSPITAL ENCOUNTER (OUTPATIENT)
Age: 57
Setting detail: SPECIMEN
Discharge: HOME | End: 2022-02-24

## 2022-02-24 VITALS
HEIGHT: 64 IN | SYSTOLIC BLOOD PRESSURE: 120 MMHG | BODY MASS INDEX: 41.71 KG/M2 | DIASTOLIC BLOOD PRESSURE: 82 MMHG | WEIGHT: 244.3 LBS

## 2022-02-24 DIAGNOSIS — Z01.419 WOMEN'S ANNUAL ROUTINE GYNECOLOGICAL EXAMINATION: Primary | ICD-10-CM

## 2022-02-24 DIAGNOSIS — N76.0 ACUTE VAGINITIS: ICD-10-CM

## 2022-02-24 DIAGNOSIS — N90.7 LABIAL CYST: ICD-10-CM

## 2022-02-24 PROCEDURE — G8484 FLU IMMUNIZE NO ADMIN: HCPCS | Performed by: NURSE PRACTITIONER

## 2022-02-24 PROCEDURE — 99386 PREV VISIT NEW AGE 40-64: CPT | Performed by: NURSE PRACTITIONER

## 2022-02-24 RX ORDER — METOPROLOL TARTRATE 50 MG/1
TABLET, FILM COATED ORAL
COMMUNITY
Start: 2022-02-10

## 2022-02-24 RX ORDER — HYDROCODONE BITARTRATE AND ACETAMINOPHEN 7.5; 325 MG/1; MG/1
TABLET ORAL
COMMUNITY
Start: 2022-02-10

## 2022-02-24 NOTE — PROGRESS NOTES
YEARLY PHYSICAL    Date of service: 2022    Ruben Tobias Rm  Is a 55 y.o.  female    PT's PCP is: Nyla Zambrano MD     : 1965                                         Chaperone for Intimate Exam   Chaperone was offered and accepted as part of the rooming process.  Chaperone: CHIKI Sykes RN      Subjective:       No LMP recorded. Patient has had an ablation. Are your menses regular: no menses since ablation     OB History    Para Term  AB Living   6 6 3 3   5   SAB IAB Ectopic Molar Multiple Live Births             5      # Outcome Date GA Lbr Georgi/2nd Weight Sex Delivery Anes PTL Lv   6   29w0d   M CS-LTranv   CHUCKY   5   32w0d   F CS-LTranv   CHUCKY   4   26w0d    CS-LTranv   FD   3 Term      Vag-Spont   CHUCKY   2 Term      Vag-Spont   CHUCKY   1 Term      Vag-Spont   CHUCKY        Social History     Tobacco Use   Smoking Status Never Smoker   Smokeless Tobacco Never Used        Social History     Substance and Sexual Activity   Alcohol Use Not Currently       Family History   Problem Relation Age of Onset    Heart Disease Paternal Grandfather     Heart Disease Paternal Grandmother     Diabetes Father     Hypertension Father     Heart Disease Father     Ovarian Cancer Mother     Hypertension Mother     Lung Cancer Sister     Diabetes Sister     Colon Cancer Sister        Any family history of breast or ovarian cancer:  Yes    Any family history of blood clots: No      Allergies: Amitriptyline, Iodides, Penicillins, Gabapentin, Pregabalin, Sulfa antibiotics, Venlafaxine, Allobarbital, and Oxycodone      Current Outpatient Medications:     metoprolol tartrate (LOPRESSOR) 50 MG tablet, take 1/2 tablet by mouth twice a day, Disp: , Rfl:     HYDROcodone-acetaminophen (NORCO) 7.5-325 MG per tablet, take 1 tablet by mouth every 4 hours if needed for pain, Disp: , Rfl:     pantoprazole (PROTONIX) 40 MG tablet, Take 40 mg by mouth daily, Disp: , Rfl:     rOPINIRole (REQUIP) 2 MG tablet, take 2 tablets by mouth at bedtime, Disp: , Rfl: 0    VENTOLIN  (90 Base) MCG/ACT inhaler, inhale 2 puffs by mouth four times a day if needed for wheezing, Disp: , Rfl: 0    albuterol (PROVENTIL) (2.5 MG/3ML) 0.083% nebulizer solution, Take 2.5 mg by nebulization every 6 hours as needed , Disp: , Rfl:     SYMBICORT 80-4.5 MCG/ACT AERO, inhale 2 puffs by mouth twice a day, Disp: , Rfl: 0    Social History     Substance and Sexual Activity   Sexual Activity Not Currently    Partners: Male       Any bleeding or pain with intercourse: n/a    Last Yearly:  ? Last pap: ? Last HPV: ?    Do you do self breast exams: encouraged     Past Medical History:   Diagnosis Date    Arthritis     Asthma     COPD (chronic obstructive pulmonary disease) (HCC)     Eczema of face     Genital warts     Hypertension     Psychiatric problem     Restless legs     Stroke [facility])        Past Surgical History:   Procedure Laterality Date    BACK SURGERY       SECTION      LUMBAR FUSION      TOTAL KNEE ARTHROPLASTY Left        Family History   Problem Relation Age of Onset    Heart Disease Paternal Grandfather     Heart Disease Paternal [de-identified]     Diabetes Father     Hypertension Father     Heart Disease Father     Ovarian Cancer Mother     Hypertension Mother     Lung Cancer Sister     Diabetes Sister     Colon Cancer Sister        Chief Complaint   Patient presents with    Gynecologic Exam     Last pap in . C/O return of genital warts and would like to discuss medications and TCA. PE:  Vital Signs  Blood pressure 120/82, height 5' 4\" (1.626 m), weight 244 lb 4.8 oz (110.8 kg). Estimated body mass index is 41.93 kg/m² as calculated from the following:    Height as of this encounter: 5' 4\" (1.626 m). Weight as of this encounter: 244 lb 4.8 oz (110.8 kg).     HPI: Patient presents today for annual exam. Denies breast/pelvic pain. Due for pap. Mammogram due; reports will likely never complete as she is afraid. Wellness reviewed. Reports vaginal lumps on labia; concerned genital warts have returned and would like to discuss treatment. Review of Systems   Constitutional: Negative for chills, fatigue and fever. Respiratory: Negative for shortness of breath. Cardiovascular: Negative for chest pain. Gastrointestinal: Negative for abdominal pain, constipation and diarrhea. Genitourinary: Positive for genital sores. Negative for dysuria, enuresis, frequency, menstrual problem, pelvic pain, urgency and vaginal bleeding. Neurological: Negative for dizziness, light-headedness and headaches. Physical Exam  Constitutional:       General: She is not in acute distress. Appearance: Normal appearance. She is not ill-appearing. Genitourinary:      Vulva normal.      No vaginal discharge. Vaginal exam comments: 4 small labial cysts     . Right Adnexa: not tender. Left Adnexa: not tender. Adnexa exam comments: Difficult to assess due to body habitus . Uterus is not tender. Breasts:      Right: No mass, nipple discharge, skin change or tenderness. Left: No mass, nipple discharge, skin change or tenderness. HENT:      Head: Normocephalic. Cardiovascular:      Rate and Rhythm: Normal rate and regular rhythm. Pulmonary:      Effort: Pulmonary effort is normal.      Breath sounds: Normal breath sounds. Abdominal:      Palpations: Abdomen is soft. Tenderness: There is no abdominal tenderness. There is no guarding or rebound. Musculoskeletal:         General: Normal range of motion. Neurological:      General: No focal deficit present. Mental Status: She is alert. Psychiatric:         Mood and Affect: Mood normal.         Behavior: Behavior normal.   Exam conducted with a chaperone present.                        Assessment and Plan Diagnosis Orders   1. Women's annual routine gynecological examination  PAP SMEAR   2. Acute vaginitis  Vaginitis DNA Probe   3. Labial cyst         Repeat Annual every 1 year  Cervical Cytology Evaluation begins at 24years old. If Negative Cytology, Follow-up screening per current guidelines. Mammograms every 1year. If 35 yo and last mammogram was negative. Routine healthmaintenance per patients PCP. encouraged warm compresses on labial cysts no evidence of infection. I have discontinued Tanisha Coleman's meloxicam, sertraline, ARIPiprazole, and melatonin ER. I am also having her maintain her rOPINIRole, Ventolin HFA, albuterol, Symbicort, pantoprazole, metoprolol tartrate, and HYDROcodone-acetaminophen. Return in about 1 year (around 2/24/2023) for yearly. She was also counseled on her preventative health maintenance recommendations and follow-up. There are no Patient Instructions on file for this visit.     AIMEE Hilton NP,2/28/2022 9:28 AM

## 2022-02-25 LAB
CANDIDA SPECIES, DNA PROBE: NEGATIVE
GARDNERELLA VAGINALIS, DNA PROBE: POSITIVE
SOURCE: ABNORMAL
TRICHOMONAS VAGINALIS DNA: NEGATIVE

## 2022-02-27 LAB
HPV SAMPLE: NORMAL
HPV, GENOTYPE 16: NOT DETECTED
HPV, GENOTYPE 18: NOT DETECTED
HPV, HIGH RISK OTHER: NOT DETECTED
HPV, INTERPRETATION: NORMAL
SPECIMEN DESCRIPTION: NORMAL

## 2022-02-27 ASSESSMENT — ENCOUNTER SYMPTOMS
DIARRHEA: 0
SHORTNESS OF BREATH: 0
CONSTIPATION: 0
ABDOMINAL PAIN: 0

## 2022-02-28 RX ORDER — METRONIDAZOLE 7.5 MG/G
1 GEL VAGINAL DAILY
Qty: 1 EACH | Refills: 0 | Status: SHIPPED | OUTPATIENT
Start: 2022-02-28 | End: 2022-03-05

## 2022-02-28 NOTE — RESULT ENCOUNTER NOTE
Positive BV. Please notify patient of these results and send Metrogel 0.84% one full applicator intravaginally x5 nights at bedtime.      Negative HPV awaiting cytology

## 2022-03-04 LAB — CYTOLOGY REPORT: NORMAL

## 2023-03-23 ENCOUNTER — OFFICE VISIT (OUTPATIENT)
Dept: PRIMARY CARE CLINIC | Age: 58
End: 2023-03-23

## 2023-03-23 VITALS
SYSTOLIC BLOOD PRESSURE: 112 MMHG | OXYGEN SATURATION: 97 % | WEIGHT: 247.2 LBS | HEIGHT: 64 IN | HEART RATE: 83 BPM | TEMPERATURE: 98.7 F | DIASTOLIC BLOOD PRESSURE: 74 MMHG | RESPIRATION RATE: 18 BRPM | BODY MASS INDEX: 42.2 KG/M2

## 2023-03-23 DIAGNOSIS — Z12.11 COLON CANCER SCREENING: ICD-10-CM

## 2023-03-23 DIAGNOSIS — R50.9 FEVER, UNSPECIFIED FEVER CAUSE: ICD-10-CM

## 2023-03-23 DIAGNOSIS — R52 BODY ACHES: ICD-10-CM

## 2023-03-23 DIAGNOSIS — G25.81 RLS (RESTLESS LEGS SYNDROME): ICD-10-CM

## 2023-03-23 DIAGNOSIS — I10 PRIMARY HYPERTENSION: ICD-10-CM

## 2023-03-23 DIAGNOSIS — A08.4 VIRAL GASTROENTERITIS: ICD-10-CM

## 2023-03-23 DIAGNOSIS — Z12.31 OTHER SCREENING MAMMOGRAM: ICD-10-CM

## 2023-03-23 DIAGNOSIS — Z76.89 ENCOUNTER TO ESTABLISH CARE: Primary | ICD-10-CM

## 2023-03-23 RX ORDER — METOPROLOL TARTRATE 50 MG/1
TABLET, FILM COATED ORAL
Qty: 90 TABLET | Refills: 1 | Status: SHIPPED | OUTPATIENT
Start: 2023-03-23

## 2023-03-23 RX ORDER — PANTOPRAZOLE SODIUM 40 MG/1
40 TABLET, DELAYED RELEASE ORAL DAILY
Qty: 90 TABLET | Refills: 1 | Status: SHIPPED | OUTPATIENT
Start: 2023-03-23

## 2023-03-23 SDOH — ECONOMIC STABILITY: INCOME INSECURITY: HOW HARD IS IT FOR YOU TO PAY FOR THE VERY BASICS LIKE FOOD, HOUSING, MEDICAL CARE, AND HEATING?: NOT HARD AT ALL

## 2023-03-23 SDOH — ECONOMIC STABILITY: FOOD INSECURITY: WITHIN THE PAST 12 MONTHS, THE FOOD YOU BOUGHT JUST DIDN'T LAST AND YOU DIDN'T HAVE MONEY TO GET MORE.: NEVER TRUE

## 2023-03-23 SDOH — ECONOMIC STABILITY: FOOD INSECURITY: WITHIN THE PAST 12 MONTHS, YOU WORRIED THAT YOUR FOOD WOULD RUN OUT BEFORE YOU GOT MONEY TO BUY MORE.: NEVER TRUE

## 2023-03-23 SDOH — ECONOMIC STABILITY: HOUSING INSECURITY
IN THE LAST 12 MONTHS, WAS THERE A TIME WHEN YOU DID NOT HAVE A STEADY PLACE TO SLEEP OR SLEPT IN A SHELTER (INCLUDING NOW)?: NO

## 2023-03-23 ASSESSMENT — PATIENT HEALTH QUESTIONNAIRE - PHQ9
3. TROUBLE FALLING OR STAYING ASLEEP: 0
1. LITTLE INTEREST OR PLEASURE IN DOING THINGS: 0
SUM OF ALL RESPONSES TO PHQ QUESTIONS 1-9: 0
SUM OF ALL RESPONSES TO PHQ QUESTIONS 1-9: 0
6. FEELING BAD ABOUT YOURSELF - OR THAT YOU ARE A FAILURE OR HAVE LET YOURSELF OR YOUR FAMILY DOWN: 0
10. IF YOU CHECKED OFF ANY PROBLEMS, HOW DIFFICULT HAVE THESE PROBLEMS MADE IT FOR YOU TO DO YOUR WORK, TAKE CARE OF THINGS AT HOME, OR GET ALONG WITH OTHER PEOPLE: 0
SUM OF ALL RESPONSES TO PHQ9 QUESTIONS 1 & 2: 0
SUM OF ALL RESPONSES TO PHQ QUESTIONS 1-9: 0
5. POOR APPETITE OR OVEREATING: 0
7. TROUBLE CONCENTRATING ON THINGS, SUCH AS READING THE NEWSPAPER OR WATCHING TELEVISION: 0
8. MOVING OR SPEAKING SO SLOWLY THAT OTHER PEOPLE COULD HAVE NOTICED. OR THE OPPOSITE, BEING SO FIGETY OR RESTLESS THAT YOU HAVE BEEN MOVING AROUND A LOT MORE THAN USUAL: 0
4. FEELING TIRED OR HAVING LITTLE ENERGY: 0
SUM OF ALL RESPONSES TO PHQ QUESTIONS 1-9: 0
9. THOUGHTS THAT YOU WOULD BE BETTER OFF DEAD, OR OF HURTING YOURSELF: 0
2. FEELING DOWN, DEPRESSED OR HOPELESS: 0

## 2023-03-23 NOTE — PROGRESS NOTES
Venlafaxine, Allobarbital, and Oxycodone    Social History:     Tobacco:    reports that she has never smoked. She has never used smokeless tobacco.  Alcohol:      reports that she does not currently use alcohol. Drug Use:  reports that she does not currently use drugs after having used the following drugs: Marijuana Ruffus Endo). Family History:     Family History   Problem Relation Age of Onset    Heart Disease Paternal Grandfather     Heart Disease Paternal Grandmother     Diabetes Father     Hypertension Father     Heart Disease Father     Ovarian Cancer Mother     Hypertension Mother     Lung Cancer Sister     Diabetes Sister     Colon Cancer Sister        Review of Systems:     Positive and Negative as described in HPI    Review of Systems   Constitutional:  Positive for fatigue and fever. HENT: Negative. Eyes: Negative. Respiratory:  Positive for cough (chronic). Cardiovascular: Negative. Gastrointestinal:  Positive for diarrhea, nausea and vomiting. Endocrine: Negative. Genitourinary: Negative. Musculoskeletal:  Positive for arthralgias. Skin: Negative. Allergic/Immunologic: Negative. Neurological: Negative. Hematological: Negative. Psychiatric/Behavioral: Negative. Physical Exam:   Vitals:  /74   Pulse 83   Temp 98.7 °F (37.1 °C) (Temporal)   Resp 18   Ht 5' 4\" (1.626 m)   Wt 247 lb 3.2 oz (112.1 kg)   SpO2 97%   BMI 42.43 kg/m²     Physical Exam  Vitals and nursing note reviewed. Constitutional:       General: She is not in acute distress. Appearance: Normal appearance. She is obese. HENT:      Head: Normocephalic. Right Ear: Tympanic membrane normal.      Left Ear: Tympanic membrane normal.      Nose: Nose normal.      Mouth/Throat:      Mouth: Mucous membranes are moist.      Pharynx: Oropharynx is clear. Eyes:      Extraocular Movements: Extraocular movements intact.       Conjunctiva/sclera: Conjunctivae normal.      Pupils: Pupils are

## 2023-03-23 NOTE — PATIENT INSTRUCTIONS
SURVEY:     You may be receiving a survey from [email or web address] regarding your visit today. Please complete the survey to enable us to provide the highest quality of care to you and your family. If you cannot score us a very good on any question, please call the office to discuss how we could have made your experience a very good one.      Thank you,    Awa Schneider, APRN-CNP  Katie Lloyd, APRN-CNP  Behzad Machado, DENISEN  Alexa Goel, MELVA Carrera, CMA  Linda, CMA  Leslie, PCA  Sonia, PM

## 2023-03-24 ASSESSMENT — ENCOUNTER SYMPTOMS
EYES NEGATIVE: 1
DIARRHEA: 1
ALLERGIC/IMMUNOLOGIC NEGATIVE: 1
NAUSEA: 1
VOMITING: 1
COUGH: 1

## 2023-03-30 ENCOUNTER — HOSPITAL ENCOUNTER (OUTPATIENT)
Age: 58
Setting detail: OBSERVATION
Discharge: HOME | End: 2023-03-31
Attending: EMERGENCY MEDICINE | Admitting: INTERNAL MEDICINE
Payer: MEDICAID

## 2023-03-30 ENCOUNTER — APPOINTMENT (OUTPATIENT)
Dept: GENERAL RADIOLOGY | Age: 58
End: 2023-03-30
Payer: MEDICAID

## 2023-03-30 DIAGNOSIS — R07.9 CHEST PAIN, UNSPECIFIED TYPE: Primary | ICD-10-CM

## 2023-03-30 LAB
ABSOLUTE EOS #: 0.37 K/UL (ref 0–0.44)
ABSOLUTE IMMATURE GRANULOCYTE: 0.06 K/UL (ref 0–0.3)
ABSOLUTE LYMPH #: 4.2 K/UL (ref 1.1–3.7)
ABSOLUTE MONO #: 0.59 K/UL (ref 0.1–1.2)
ALBUMIN SERPL-MCNC: 4 G/DL (ref 3.5–5.2)
ALBUMIN/GLOBULIN RATIO: 1.2 (ref 1–2.5)
ALP SERPL-CCNC: 85 U/L (ref 35–104)
ALT SERPL-CCNC: 10 U/L (ref 5–33)
ANION GAP SERPL CALCULATED.3IONS-SCNC: 11 MMOL/L (ref 9–17)
AST SERPL-CCNC: 14 U/L
BASOPHILS # BLD: 1 % (ref 0–2)
BASOPHILS ABSOLUTE: 0.12 K/UL (ref 0–0.2)
BILIRUB SERPL-MCNC: 0.3 MG/DL (ref 0.3–1.2)
BNP SERPL-MCNC: 47 PG/ML
BUN SERPL-MCNC: 12 MG/DL (ref 6–20)
BUN/CREAT BLD: 17 (ref 9–20)
CALCIUM SERPL-MCNC: 9 MG/DL (ref 8.6–10.4)
CHLORIDE SERPL-SCNC: 100 MMOL/L (ref 98–107)
CO2 SERPL-SCNC: 27 MMOL/L (ref 20–31)
CREAT SERPL-MCNC: 0.72 MG/DL (ref 0.5–0.9)
D DIMER BLD IA.RAPID-MCNC: 0.53 MG/L FEU (ref 0–0.59)
EOSINOPHILS RELATIVE PERCENT: 3 % (ref 1–4)
GFR SERPL CREATININE-BSD FRML MDRD: >60 ML/MIN/1.73M2
GLUCOSE SERPL-MCNC: 132 MG/DL (ref 70–99)
HCT VFR BLD AUTO: 42.5 % (ref 36.3–47.1)
HGB BLD-MCNC: 13.2 G/DL (ref 11.9–15.1)
IMMATURE GRANULOCYTES: 1 %
LYMPHOCYTES # BLD: 36 % (ref 24–43)
MCH RBC QN AUTO: 27.2 PG (ref 25.2–33.5)
MCHC RBC AUTO-ENTMCNC: 31.1 G/DL (ref 28.4–34.8)
MCV RBC AUTO: 87.6 FL (ref 82.6–102.9)
MONOCYTES # BLD: 5 % (ref 3–12)
NRBC AUTOMATED: 0 PER 100 WBC
PDW BLD-RTO: 14.1 % (ref 11.8–14.4)
PLATELET # BLD AUTO: 312 K/UL (ref 138–453)
PMV BLD AUTO: 10 FL (ref 8.1–13.5)
POTASSIUM SERPL-SCNC: 3.7 MMOL/L (ref 3.7–5.3)
PROT SERPL-MCNC: 7.4 G/DL (ref 6.4–8.3)
RBC # BLD: 4.85 M/UL (ref 3.95–5.11)
SEG NEUTROPHILS: 54 % (ref 36–65)
SEGMENTED NEUTROPHILS ABSOLUTE COUNT: 6.23 K/UL (ref 1.5–8.1)
SODIUM SERPL-SCNC: 138 MMOL/L (ref 135–144)
TROPONIN I SERPL DL<=0.01 NG/ML-MCNC: <6 NG/L (ref 0–14)
TROPONIN I SERPL DL<=0.01 NG/ML-MCNC: <6 NG/L (ref 0–14)
WBC # BLD AUTO: 11.6 K/UL (ref 3.5–11.3)

## 2023-03-30 PROCEDURE — 85379 FIBRIN DEGRADATION QUANT: CPT

## 2023-03-30 PROCEDURE — 85025 COMPLETE CBC W/AUTO DIFF WBC: CPT

## 2023-03-30 PROCEDURE — 99285 EMERGENCY DEPT VISIT HI MDM: CPT

## 2023-03-30 PROCEDURE — 36415 COLL VENOUS BLD VENIPUNCTURE: CPT

## 2023-03-30 PROCEDURE — 71045 X-RAY EXAM CHEST 1 VIEW: CPT

## 2023-03-30 PROCEDURE — 83880 ASSAY OF NATRIURETIC PEPTIDE: CPT

## 2023-03-30 PROCEDURE — 93005 ELECTROCARDIOGRAM TRACING: CPT | Performed by: EMERGENCY MEDICINE

## 2023-03-30 PROCEDURE — G0378 HOSPITAL OBSERVATION PER HR: HCPCS

## 2023-03-30 PROCEDURE — 84484 ASSAY OF TROPONIN QUANT: CPT

## 2023-03-30 PROCEDURE — 6370000000 HC RX 637 (ALT 250 FOR IP): Performed by: EMERGENCY MEDICINE

## 2023-03-30 PROCEDURE — 80053 COMPREHEN METABOLIC PANEL: CPT

## 2023-03-30 RX ORDER — ASPIRIN 81 MG/1
324 TABLET, CHEWABLE ORAL ONCE
Status: COMPLETED | OUTPATIENT
Start: 2023-03-30 | End: 2023-03-30

## 2023-03-30 RX ORDER — NITROGLYCERIN 0.4 MG/1
0.4 TABLET SUBLINGUAL EVERY 5 MIN PRN
Status: DISCONTINUED | OUTPATIENT
Start: 2023-03-30 | End: 2023-03-31 | Stop reason: HOSPADM

## 2023-03-30 RX ADMIN — NITROGLYCERIN 0.4 MG: 0.4 TABLET SUBLINGUAL at 22:25

## 2023-03-30 RX ADMIN — ASPIRIN 81 MG 324 MG: 81 TABLET ORAL at 22:24

## 2023-03-30 ASSESSMENT — PAIN SCALES - GENERAL: PAINLEVEL_OUTOF10: 5

## 2023-03-30 ASSESSMENT — HEART SCORE: ECG: 1

## 2023-03-31 ENCOUNTER — APPOINTMENT (OUTPATIENT)
Dept: NON INVASIVE DIAGNOSTICS | Age: 58
End: 2023-03-31
Payer: MEDICAID

## 2023-03-31 VITALS
HEART RATE: 68 BPM | RESPIRATION RATE: 18 BRPM | TEMPERATURE: 97.8 F | SYSTOLIC BLOOD PRESSURE: 95 MMHG | HEIGHT: 64 IN | DIASTOLIC BLOOD PRESSURE: 50 MMHG | OXYGEN SATURATION: 95 % | WEIGHT: 246.6 LBS | BODY MASS INDEX: 42.1 KG/M2

## 2023-03-31 PROBLEM — F31.9 BIPOLAR 1 DISORDER (HCC): Status: RESOLVED | Noted: 2020-01-25 | Resolved: 2023-03-31

## 2023-03-31 PROBLEM — I10 HYPERTENSION: Status: ACTIVE | Noted: 2023-03-31

## 2023-03-31 LAB
ABSOLUTE EOS #: 0.37 K/UL (ref 0–0.44)
ABSOLUTE IMMATURE GRANULOCYTE: 0.04 K/UL (ref 0–0.3)
ABSOLUTE LYMPH #: 3.73 K/UL (ref 1.1–3.7)
ABSOLUTE MONO #: 0.55 K/UL (ref 0.1–1.2)
BASOPHILS # BLD: 1 % (ref 0–2)
BASOPHILS ABSOLUTE: 0.13 K/UL (ref 0–0.2)
EKG ATRIAL RATE: 56 BPM
EKG ATRIAL RATE: 83 BPM
EKG P AXIS: 54 DEGREES
EKG P AXIS: 64 DEGREES
EKG P-R INTERVAL: 158 MS
EKG P-R INTERVAL: 160 MS
EKG Q-T INTERVAL: 366 MS
EKG Q-T INTERVAL: 426 MS
EKG QRS DURATION: 78 MS
EKG QRS DURATION: 78 MS
EKG QTC CALCULATION (BAZETT): 411 MS
EKG QTC CALCULATION (BAZETT): 430 MS
EKG R AXIS: 5 DEGREES
EKG R AXIS: 9 DEGREES
EKG T AXIS: 21 DEGREES
EKG T AXIS: 49 DEGREES
EKG VENTRICULAR RATE: 56 BPM
EKG VENTRICULAR RATE: 83 BPM
EOSINOPHILS RELATIVE PERCENT: 4 % (ref 1–4)
HCT VFR BLD AUTO: 40.1 % (ref 36.3–47.1)
HGB BLD-MCNC: 12.6 G/DL (ref 11.9–15.1)
IMMATURE GRANULOCYTES: 0 %
LYMPHOCYTES # BLD: 39 % (ref 24–43)
MCH RBC QN AUTO: 27.3 PG (ref 25.2–33.5)
MCHC RBC AUTO-ENTMCNC: 31.4 G/DL (ref 28.4–34.8)
MCV RBC AUTO: 86.8 FL (ref 82.6–102.9)
MONOCYTES # BLD: 6 % (ref 3–12)
NRBC AUTOMATED: 0 PER 100 WBC
PDW BLD-RTO: 14.3 % (ref 11.8–14.4)
PLATELET # BLD AUTO: 288 K/UL (ref 138–453)
PMV BLD AUTO: 10.1 FL (ref 8.1–13.5)
RBC # BLD: 4.62 M/UL (ref 3.95–5.11)
SEG NEUTROPHILS: 50 % (ref 36–65)
SEGMENTED NEUTROPHILS ABSOLUTE COUNT: 4.74 K/UL (ref 1.5–8.1)
WBC # BLD AUTO: 9.6 K/UL (ref 3.5–11.3)

## 2023-03-31 PROCEDURE — 85025 COMPLETE CBC W/AUTO DIFF WBC: CPT

## 2023-03-31 PROCEDURE — 3430000000 HC RX DIAGNOSTIC RADIOPHARMACEUTICAL: Performed by: INTERNAL MEDICINE

## 2023-03-31 PROCEDURE — 6370000000 HC RX 637 (ALT 250 FOR IP): Performed by: INTERNAL MEDICINE

## 2023-03-31 PROCEDURE — G0378 HOSPITAL OBSERVATION PER HR: HCPCS

## 2023-03-31 PROCEDURE — 6370000000 HC RX 637 (ALT 250 FOR IP): Performed by: EMERGENCY MEDICINE

## 2023-03-31 PROCEDURE — 96372 THER/PROPH/DIAG INJ SC/IM: CPT

## 2023-03-31 PROCEDURE — 93005 ELECTROCARDIOGRAM TRACING: CPT | Performed by: INTERNAL MEDICINE

## 2023-03-31 PROCEDURE — 6360000002 HC RX W HCPCS: Performed by: INTERNAL MEDICINE

## 2023-03-31 PROCEDURE — 36415 COLL VENOUS BLD VENIPUNCTURE: CPT

## 2023-03-31 PROCEDURE — A9500 TC99M SESTAMIBI: HCPCS | Performed by: INTERNAL MEDICINE

## 2023-03-31 PROCEDURE — 94640 AIRWAY INHALATION TREATMENT: CPT

## 2023-03-31 PROCEDURE — 93010 ELECTROCARDIOGRAM REPORT: CPT | Performed by: INTERNAL MEDICINE

## 2023-03-31 PROCEDURE — 93017 CV STRESS TEST TRACING ONLY: CPT

## 2023-03-31 PROCEDURE — 78452 HT MUSCLE IMAGE SPECT MULT: CPT

## 2023-03-31 PROCEDURE — 2580000003 HC RX 258: Performed by: INTERNAL MEDICINE

## 2023-03-31 PROCEDURE — 94761 N-INVAS EAR/PLS OXIMETRY MLT: CPT

## 2023-03-31 RX ORDER — ROPINIROLE 1 MG/1
4 TABLET, FILM COATED ORAL NIGHTLY
Status: DISCONTINUED | OUTPATIENT
Start: 2023-03-31 | End: 2023-03-31 | Stop reason: HOSPADM

## 2023-03-31 RX ORDER — ALBUTEROL SULFATE 2.5 MG/3ML
2.5 SOLUTION RESPIRATORY (INHALATION) EVERY 6 HOURS PRN
Status: DISCONTINUED | OUTPATIENT
Start: 2023-03-31 | End: 2023-03-31 | Stop reason: HOSPADM

## 2023-03-31 RX ORDER — ONDANSETRON 2 MG/ML
4 INJECTION INTRAMUSCULAR; INTRAVENOUS EVERY 6 HOURS PRN
Status: DISCONTINUED | OUTPATIENT
Start: 2023-03-31 | End: 2023-03-31 | Stop reason: HOSPADM

## 2023-03-31 RX ORDER — LORAZEPAM 0.5 MG/1
0.5 TABLET ORAL NIGHTLY PRN
Qty: 10 TABLET | Refills: 0 | Status: SHIPPED | OUTPATIENT
Start: 2023-03-31 | End: 2023-04-30

## 2023-03-31 RX ORDER — ACETAMINOPHEN 325 MG/1
650 TABLET ORAL EVERY 6 HOURS PRN
Status: DISCONTINUED | OUTPATIENT
Start: 2023-03-31 | End: 2023-03-31 | Stop reason: HOSPADM

## 2023-03-31 RX ORDER — TETRAKIS(2-METHOXYISOBUTYLISOCYANIDE)COPPER(I) TETRAFLUOROBORATE 1 MG/ML
30 INJECTION, POWDER, LYOPHILIZED, FOR SOLUTION INTRAVENOUS
Status: COMPLETED | OUTPATIENT
Start: 2023-03-31 | End: 2023-03-31

## 2023-03-31 RX ORDER — ALBUTEROL SULFATE 90 UG/1
2 AEROSOL, METERED RESPIRATORY (INHALATION) 4 TIMES DAILY PRN
Status: DISCONTINUED | OUTPATIENT
Start: 2023-03-31 | End: 2023-03-31 | Stop reason: HOSPADM

## 2023-03-31 RX ORDER — SODIUM CHLORIDE 0.9 % (FLUSH) 0.9 %
10 SYRINGE (ML) INJECTION PRN
Status: DISCONTINUED | OUTPATIENT
Start: 2023-03-31 | End: 2023-03-31 | Stop reason: HOSPADM

## 2023-03-31 RX ORDER — PANTOPRAZOLE SODIUM 40 MG/1
40 TABLET, DELAYED RELEASE ORAL DAILY
Status: DISCONTINUED | OUTPATIENT
Start: 2023-03-31 | End: 2023-03-31 | Stop reason: HOSPADM

## 2023-03-31 RX ORDER — TETRAKIS(2-METHOXYISOBUTYLISOCYANIDE)COPPER(I) TETRAFLUOROBORATE 1 MG/ML
10 INJECTION, POWDER, LYOPHILIZED, FOR SOLUTION INTRAVENOUS
Status: COMPLETED | OUTPATIENT
Start: 2023-03-31 | End: 2023-03-31

## 2023-03-31 RX ORDER — ACETAMINOPHEN 650 MG/1
650 SUPPOSITORY RECTAL EVERY 6 HOURS PRN
Status: DISCONTINUED | OUTPATIENT
Start: 2023-03-31 | End: 2023-03-31 | Stop reason: HOSPADM

## 2023-03-31 RX ORDER — ONDANSETRON 4 MG/1
4 TABLET, ORALLY DISINTEGRATING ORAL EVERY 8 HOURS PRN
Status: DISCONTINUED | OUTPATIENT
Start: 2023-03-31 | End: 2023-03-31 | Stop reason: HOSPADM

## 2023-03-31 RX ORDER — SENNA PLUS 8.6 MG/1
1 TABLET ORAL EVERY OTHER DAY
COMMUNITY

## 2023-03-31 RX ORDER — ENOXAPARIN SODIUM 100 MG/ML
30 INJECTION SUBCUTANEOUS 2 TIMES DAILY
Status: DISCONTINUED | OUTPATIENT
Start: 2023-03-31 | End: 2023-03-31 | Stop reason: HOSPADM

## 2023-03-31 RX ORDER — SODIUM CHLORIDE 9 MG/ML
INJECTION, SOLUTION INTRAVENOUS PRN
Status: DISCONTINUED | OUTPATIENT
Start: 2023-03-31 | End: 2023-03-31 | Stop reason: HOSPADM

## 2023-03-31 RX ORDER — HYDROCODONE BITARTRATE AND ACETAMINOPHEN 7.5; 325 MG/1; MG/1
1 TABLET ORAL EVERY 6 HOURS PRN
Status: DISCONTINUED | OUTPATIENT
Start: 2023-03-31 | End: 2023-03-31 | Stop reason: HOSPADM

## 2023-03-31 RX ORDER — ONDANSETRON 4 MG/1
4 TABLET, FILM COATED ORAL EVERY 8 HOURS PRN
COMMUNITY

## 2023-03-31 RX ORDER — METOPROLOL TARTRATE 50 MG/1
50 TABLET, FILM COATED ORAL DAILY
Status: DISCONTINUED | OUTPATIENT
Start: 2023-03-31 | End: 2023-03-31 | Stop reason: HOSPADM

## 2023-03-31 RX ORDER — HYDROCODONE BITARTRATE AND ACETAMINOPHEN 5; 325 MG/1; MG/1
1 TABLET ORAL ONCE
Status: COMPLETED | OUTPATIENT
Start: 2023-03-31 | End: 2023-03-31

## 2023-03-31 RX ORDER — SODIUM CHLORIDE 0.9 % (FLUSH) 0.9 %
5-40 SYRINGE (ML) INJECTION EVERY 12 HOURS SCHEDULED
Status: DISCONTINUED | OUTPATIENT
Start: 2023-03-31 | End: 2023-03-31 | Stop reason: HOSPADM

## 2023-03-31 RX ORDER — POLYETHYLENE GLYCOL 3350 17 G/17G
17 POWDER, FOR SOLUTION ORAL DAILY PRN
Status: DISCONTINUED | OUTPATIENT
Start: 2023-03-31 | End: 2023-03-31 | Stop reason: HOSPADM

## 2023-03-31 RX ADMIN — SODIUM CHLORIDE, PRESERVATIVE FREE 10 ML: 5 INJECTION INTRAVENOUS at 08:24

## 2023-03-31 RX ADMIN — ENOXAPARIN SODIUM 30 MG: 30 INJECTION SUBCUTANEOUS at 01:48

## 2023-03-31 RX ADMIN — MOMETASONE FUROATE AND FORMOTEROL FUMARATE DIHYDRATE 2 PUFF: 100; 5 AEROSOL RESPIRATORY (INHALATION) at 08:44

## 2023-03-31 RX ADMIN — HYDROCODONE BITARTRATE AND ACETAMINOPHEN 1 TABLET: 7.5; 325 TABLET ORAL at 12:43

## 2023-03-31 RX ADMIN — Medication 30 MILLICURIE: at 11:35

## 2023-03-31 RX ADMIN — ROPINIROLE HYDROCHLORIDE 4 MG: 1 TABLET, FILM COATED ORAL at 01:42

## 2023-03-31 RX ADMIN — Medication 10 MILLICURIE: at 11:35

## 2023-03-31 RX ADMIN — HYDROCODONE BITARTRATE AND ACETAMINOPHEN 1 TABLET: 5; 325 TABLET ORAL at 00:25

## 2023-03-31 RX ADMIN — METOPROLOL TARTRATE 50 MG: 50 TABLET, FILM COATED ORAL at 12:42

## 2023-03-31 ASSESSMENT — PAIN DESCRIPTION - ONSET
ONSET: ON-GOING
ONSET: ON-GOING
ONSET: SUDDEN
ONSET: GRADUAL

## 2023-03-31 ASSESSMENT — PAIN DESCRIPTION - PAIN TYPE
TYPE: ACUTE PAIN
TYPE: ACUTE PAIN
TYPE: CHRONIC PAIN

## 2023-03-31 ASSESSMENT — PAIN - FUNCTIONAL ASSESSMENT
PAIN_FUNCTIONAL_ASSESSMENT: ACTIVITIES ARE NOT PREVENTED
PAIN_FUNCTIONAL_ASSESSMENT: ACTIVITIES ARE NOT PREVENTED
PAIN_FUNCTIONAL_ASSESSMENT_SITE2: PREVENTS OR INTERFERES SOME ACTIVE ACTIVITIES AND ADLS
PAIN_FUNCTIONAL_ASSESSMENT: PREVENTS OR INTERFERES SOME ACTIVE ACTIVITIES AND ADLS
PAIN_FUNCTIONAL_ASSESSMENT: PREVENTS OR INTERFERES SOME ACTIVE ACTIVITIES AND ADLS
PAIN_FUNCTIONAL_ASSESSMENT_SITE2: PREVENTS OR INTERFERES SOME ACTIVE ACTIVITIES AND ADLS

## 2023-03-31 ASSESSMENT — PAIN DESCRIPTION - LOCATION
LOCATION_2: BACK
LOCATION: CHEST
LOCATION_2: BACK
LOCATION: CHEST
LOCATION: BACK
LOCATION: CHEST
LOCATION: BACK

## 2023-03-31 ASSESSMENT — PAIN SCALES - GENERAL
PAINLEVEL_OUTOF10: 8
PAINLEVEL_OUTOF10: 3
PAINLEVEL_OUTOF10: 3
PAINLEVEL_OUTOF10: 9
PAINLEVEL_OUTOF10: 5
PAINLEVEL_OUTOF10: 4
PAINLEVEL_OUTOF10: 5
PAINLEVEL_OUTOF10: 2

## 2023-03-31 ASSESSMENT — PAIN DESCRIPTION - DESCRIPTORS
DESCRIPTORS: NAGGING
DESCRIPTORS: PRESSURE
DESCRIPTORS: DISCOMFORT
DESCRIPTORS: ACHING;DISCOMFORT
DESCRIPTORS_2: ACHING;THROBBING
DESCRIPTORS: ACHING;DISCOMFORT
DESCRIPTORS_2: ACHING;THROBBING

## 2023-03-31 ASSESSMENT — PAIN DESCRIPTION - INTENSITY
RATING_2: 8
RATING_2: 6

## 2023-03-31 ASSESSMENT — PAIN DESCRIPTION - ORIENTATION
ORIENTATION: ANTERIOR
ORIENTATION: ANTERIOR;LEFT
ORIENTATION: LOWER
ORIENTATION_2: LOWER
ORIENTATION: LEFT;ANTERIOR
ORIENTATION: LOWER
ORIENTATION_2: LOWER

## 2023-03-31 ASSESSMENT — PAIN DESCRIPTION - FREQUENCY
FREQUENCY: INTERMITTENT
FREQUENCY: CONTINUOUS
FREQUENCY: CONTINUOUS
FREQUENCY: INTERMITTENT

## 2023-03-31 NOTE — PROGRESS NOTES
Writer reviewed discharge instructions with patient. Patient aware of need to  prescription. Patient also aware of follow up appointment. Writer instructed patient of importance of Heart Healthy Diet and provided handout. Patient denies questions. Copy of discharge instructions given to patient.

## 2023-03-31 NOTE — PLAN OF CARE
Problem: Discharge Planning  Goal: Discharge to home or other facility with appropriate resources  Outcome: Progressing    Discharge plan is home upon being medically cleared. LSW providing assistance for discharge needs. Problem: Pain  Goal: Verbalizes/displays adequate comfort level or baseline comfort level  3/31/2023 1000 by Kingston Goyal RN  Outcome: Progressing   Patient encouraged to let staff know when pain is present and to request pain medication when needing it. Non-pharmaceutical measures encouraged, rest and repositioning. Problem: Safety - Adult  Goal: Free from fall injury  3/31/2023 1000 by Kingston Goyal RN  Outcome: Progressing   Patient's bed in lowest position. Bed/chair wheel locked. Call light within reach. Non-skid socks worn. Bedside table within reach. Bedrails up x2. Patient is up with assistance and A&O x4. Problem: Cardiovascular - Adult  Goal: Absence of cardiac dysrhythmias or at baseline  Outcome: Progressing   Monitor patient's vital signs, EKG, and symptoms of chest pain.
Problem: Pain  Goal: Verbalizes/displays adequate comfort level or baseline comfort level  Outcome: Progressing  Flowsheets  Taken 3/31/2023 0213  Verbalizes/displays adequate comfort level or baseline comfort level:   Encourage patient to monitor pain and request assistance   Assess pain using appropriate pain scale   Administer analgesics based on type and severity of pain and evaluate response   Implement non-pharmacological measures as appropriate and evaluate response  Taken 3/31/2023 0118  Verbalizes/displays adequate comfort level or baseline comfort level:   Encourage patient to monitor pain and request assistance   Assess pain using appropriate pain scale   Administer analgesics based on type and severity of pain and evaluate response   Implement non-pharmacological measures as appropriate and evaluate response  Note: Patient able to call out appropriately when in pain. Will continue to monitor this shift. Problem: Safety - Adult  Goal: Free from fall injury  Outcome: Progressing  Note: Bed alarm on, bed locked, side rails up, gripper socks on, call light within reach and able to use appropriately. Will continue to monitor this shift.
conditions and co-morbidity symptoms are monitored and maintained or improved  Outcome: Completed     Problem: Cardiovascular - Adult  Goal: Absence of cardiac dysrhythmias or at baseline  3/31/2023 1537 by Virgil Doss RN  Outcome: Completed  3/31/2023 1000 by Maria Elena Pedraza RN  Outcome: Progressing

## 2023-03-31 NOTE — ED PROVIDER NOTES
[address]      Pt Name: Lisandro Garcia  MRN: [id number]  [place] 1965  Date of evaluation: 3/30/2023  Provider: Yessenia Jo MD    [address] [place] [address]       Chief Complaint   Patient presents with    Chest Pain    Shoulder Pain     Pt here for R sided cp that radiates to the L arm and to the back. Pt states the pain started this afternoon and has gotten worse. Took tylenol with minimal relief. Denies nausea or sob. HISTORY OF PRESENT ILLNESS      Tanisha Machado is a 65 y.o. female who presents to the emergency department for evaluation of chest pain. States that it began earlier today. Pain is central, pressure-like and radiates to the left arm and left axilla. Has gotten worse throughout the day today. Denies any history of similar pain. No dyspnea. Some nausea but no vomiting. No abdominal pain. No lower extremity pain or swelling. She has no other complaints at this time. Reports that she did have a fever about 4 to 5 days ago, but has recovered from that. No fever or URI symptoms since.       PAST MEDICAL HISTORY     Past Medical History:   Diagnosis Date    Arthritis     Asthma     COPD (chronic obstructive pulmonary disease) ([facility])     Eczema of face     Genital warts     Hypertension     Psychiatric problem     Restless legs     Stroke ([facility] [address]          SURGICAL HISTORY       Past Surgical History:   Procedure Laterality Date    APPENDECTOMY      BACK SURGERY       SECTION      CHOLECYSTECTOMY      KIDNEY SURGERY Right     LUMBAR FUSION      TONSILLECTOMY      TOTAL KNEE ARTHROPLASTY Left          CURRENT MEDICATIONS       Previous Medications    ALBUTEROL (PROVENTIL) (2.5 MG/3ML) 0.083% NEBULIZER SOLUTION    Take 2.5 mg by nebulization every 6 hours as needed     HYDROCODONE-ACETAMINOPHEN (NORCO) 7.5-325 MG PER TABLET    take 1 tablet by mouth every 4 hours if needed for pain    METOPROLOL TARTRATE (LOPRESSOR) 50 MG TABLET    50 mg Take 1 tablet by mouth daily. PANTOPRAZOLE (PROTONIX) 40 MG TABLET    Take 1 tablet by mouth daily    ROPINIROLE (REQUIP) 2 MG TABLET    take 2 tablets by mouth at bedtime    SYMBICORT 80-4.5 MCG/ACT AERO    inhale 2 puffs by mouth twice a day    VENTOLIN  (90 BASE) MCG/ACT INHALER    inhale 2 puffs by mouth four times a day if needed for wheezing       ALLERGIES       Amitriptyline, Iodides, Penicillins, Gabapentin, Pregabalin, Sulfa antibiotics, Venlafaxine, Allobarbital, and Oxycodone    FAMILY HISTORY       Family History   Problem Relation Age of Onset    Heart Disease Paternal Grandfather     Heart Disease Paternal Grandmother     Diabetes Father     Hypertension Father     Heart Disease Father     Ovarian Cancer Mother     Hypertension Mother     Lung Cancer Sister     Diabetes Sister     Colon Cancer Sister           SOCIAL HISTORY       Social History     Tobacco Use    Smoking status: Never    Smokeless tobacco: Never   Vaping Use    Vaping Use: Never used   Substance Use Topics    Alcohol use: Not Currently    Drug use: Not Currently     Types: Marijuana (Weed)         PHYSICAL EXAM       ED Triage Vitals [03/30/23 2137]   BP Temp Temp Source Heart Rate Resp SpO2 Height Weight   (!) 158/101 98.2 °F (36.8 °C) Tympanic 86 -- -- 5' 3.75\" (1.619 m) 250 lb (113.4 kg)       Physical Exam  Vitals reviewed. Constitutional:       General: She is not in acute distress. Appearance: She is not ill-appearing, toxic-appearing or diaphoretic. HENT:      Head: Normocephalic and atraumatic. Nose: Nose normal.   Eyes:      General: No scleral icterus. Conjunctiva/sclera: Conjunctivae normal.   Neck:      Vascular: No JVD. Cardiovascular:      Rate and Rhythm: Normal rate and regular rhythm. Heart sounds: No murmur heard. Pulmonary:      Effort: Pulmonary effort is normal.      Breath sounds: Normal breath sounds. Abdominal:      General: There is no distension.       Palpations: Abdomen is

## 2023-03-31 NOTE — PROGRESS NOTES
Patient states she is feeling chest pain again at this time in the left upper chest. EKG done at this time and reviewed by [facility] CNP. Patient's BP reassessed at this time but to lower BP, no nitroglyercin given at this time per [facility] CNP. No further orders at this time.

## 2023-03-31 NOTE — PROGRESS NOTES
Patient arrived to writer from ED. Writer received report from ED nurse Rashid Carrero RN. Patient ambulated to the bed with assistance and tolerated fairly well. Patient alert and oriented x4. Able to answer questions appropriately and follow commands. Vitals and assessment as charted. Admission navigator completed. Medications reviewed. Bed alarm on, bed locked, gripper socks on, call light within reach and able to use appropriately. Patient does state she normally follows up with palliative care for pain management. Will continue to monitor this shift. Patient denies any further needs at this time.

## 2023-03-31 NOTE — DISCHARGE INSTR - DIET

## 2023-03-31 NOTE — PROGRESS NOTES
Nutrition Assessment     Type and Reason for Visit: Initial    Nutrition Recommendations/Plan:   Continue NPO diet. Progress to low fat/low cholesterol, high fiber, GARRETT diet as medically appropriate. Malnutrition Assessment:  Malnutrition Status: Insufficient data    Nutrition Assessment:  Inadquate oral intakes r/t cardiac dysfunction aeb NPO for stress test. Pt currently at stress test. Attach heart healthy diet information to d/c instructions. Nutrition Related Findings:   unable to assess Wound Type: None    Current Nutrition Therapies:    Diet NPO    Anthropometric Measures:  Height: 5' 4\" (162.6 cm)  Current Body Wt: 246 lb 10 oz (111.9 kg)   BMI: 42.3  Recent Labs     03/30/23  2215      K 3.7      CO2 27   BUN 12   CREATININE 0.72   GLUCOSE 132*   ALT 10   ALKPHOS 85      Lab Results   Component Value Date/Time    LABALBU 4.0 03/30/2023 10:15 PM    LABALBU 4.4 11/18/2011 10:06 PM       Nutrition Diagnosis:   Inadequate oral intake related to cardiac dysfunction as evidenced by NPO or clear liquid status due to medical condition    Nutrition Interventions:   Food and/or Nutrient Delivery: Continue NPO  Nutrition Education/Counseling: Education needed  Coordination of Nutrition Care: Continue to monitor while inpatient  Plan of Care discussed with: no one    Goals:     Goals: Initiate PO diet       Nutrition Monitoring and Evaluation:   Behavioral-Environmental Outcomes: None Identified  Food/Nutrient Intake Outcomes: Diet Advancement/Tolerance  Physical Signs/Symptoms Outcomes: Biochemical Data, Weight, Fluid Status or Edema    Discharge Planning:     Too soon to determine     Joe Dillon, [address] [place] [address],   Contact: [phone]

## 2023-03-31 NOTE — H&P
History and Physical    Patient:  Gagan Ramey  MRN: [id number]    Chief Complaint:  chest pain    History Obtained From:  patient, electronic medical record    PCP: AIMEE Ervin CNP    History of Present Illness: The patient is a 65 y.o. female who presented to the ER with complaints of deep chest pain across chest.  She stated that she also had pain in her left arm and left axilla area. Stated she had the flu 2 days prior with fever, n/v, myalgias which are resolved. She denied cough or SOB. She admits to acid reflux and takes Protonix. She stated pain was all day yesterday and continues to have today. She appears anxious. Her and her initial evaluation patient was hypertensive at 158/101. She was anxious. Heart score was 5. Troponins were negative. EKG was negative for acute process. Past Medical History:        Diagnosis Date    Arthritis     Asthma     COPD (chronic obstructive pulmonary disease) (HCC)     Eczema of face     Genital warts     Hypertension     Psychiatric problem     Restless legs     Stroke [facility])        Past Surgical History:        Procedure Laterality Date    APPENDECTOMY      BACK SURGERY       SECTION      CHOLECYSTECTOMY      KIDNEY SURGERY Right     LUMBAR FUSION      TONSILLECTOMY      TOTAL KNEE ARTHROPLASTY Left        Medications Prior to Admission:    Prior to Admission medications    Medication Sig Start Date End Date Taking? Authorizing Provider   ondansetron (ZOFRAN) 4 MG tablet Take 4 mg by mouth every 8 hours as needed for Nausea or Vomiting   Yes Historical Provider, MD   senna (SENOKOT) 8.6 MG tablet Take 1 tablet by mouth every other day   Yes Historical Provider, MD   pantoprazole (PROTONIX) 40 MG tablet Take 1 tablet by mouth daily 3/23/23   AIMEE Nieves CNP   metoprolol tartrate (LOPRESSOR) 50 MG tablet 50 mg Take 1 tablet by mouth daily.  3/23/23   AIMEE Nieves CNP   HYDROcodone-acetaminophen ([facility]) 7.5-325 MG per

## 2023-03-31 NOTE — DISCHARGE SUMMARY
Discharge Summary    Sarah Rosales  :  1965  MRN:  [id number]    Admit date:  3/30/2023      Discharge date: 3/31/2023     Admitting Physician:  Dave Tamez MD    Discharge Diagnoses:    Principal Problem:    Chest pain in adult  Active Problems:    Bipolar 1 disorder ([address]    Hypertension  Resolved Problems:    * No resolved hospital problems. *      Hospital Course:   Sarah Rosales is a 65 y.o. female admitted with chest pain. She presented to the ER with complaints of deep chest pain across chest.  She stated that she also had pain in her left arm and left axilla area. Stated she had the flu 2 days prior with fever, n/v, myalgias which are resolved. She denied cough or SOB. She admits to acid reflux and takes Protonix. She stated pain was all day yesterday and continues to have today. She appears anxious. Her and her initial evaluation patient was hypertensive at 158/101. She was anxious. Heart score was 5. Troponins were negative. EKG was negative for acute process. Patient underwent stress test which was negative. Plan will be to discharge home today. She has no further complaints. She did asked that I prescribe her lorazepam she states she takes at nighttime. I did run an [facility] report and did confirm that she does take this last given prescription was in 2023 which is 0.5 milligram and she gets 30 tablets. I will prescribe her 10 tablets until she gets her follow-up with Misbah Bosch in the office. She is does state that she does have chronic pain and anxiety and is under palliative care as well. Consultants:  none    Procedures: Stress Test    Complications: none    Discharge Condition: fair    Exam:    GEN:    Awake, alert and oriented x3. EYES:   EOMI, pupils equal   NECK: Supple. No lymphadenopathy.   No carotid bruit  CVS:     regular rate and rhythm, no audible murmur  PULM:  CTA, no wheezes, rales or rhonchi, no acute respiratory distress  ABD:     Bowels sounds normal.  Abdomen is soft. No distention. no tenderness to palpation. EXT:     no edema bilaterally . No calf tenderness. NEURO: Moves all extremities. Motor and sensory are grossly intact  SKIN:    No rashes. No skin lesions    Significant Diagnostic Studies:   Lab Results   Component Value Date    WBC 9.6 03/31/2023    HGB 12.6 03/31/2023     03/31/2023       Lab Results   Component Value Date    BUN 12 03/30/2023    CREATININE 0.72 03/30/2023     03/30/2023    K 3.7 03/30/2023    CALCIUM 9.0 03/30/2023     03/30/2023    CO2 27 03/30/2023    LABGLOM >60 03/30/2023       Lab Results   Component Value Date    LEUKOCYTESUR NEGATIVE 11/18/2011    GLUCOSEU NEGATIVE 11/18/2011    KETUA NEGATIVE 11/18/2011    PROTEINU NEGATIVE 11/18/2011       XR CHEST PORTABLE    Result Date: 3/30/2023  EXAMINATION: ONE XRAY VIEW OF THE CHEST 3/30/2023 9:56 pm COMPARISON: None. HISTORY: ORDERING SYSTEM PROVIDED HISTORY: Chest pain TECHNOLOGIST PROVIDED HISTORY: Chest pain FINDINGS: Mild edema. Cardiomegaly. Mediastinum normal.  Bony thorax intact     Mild edema or interstitial lung disease. Assessment and Plan:  Patient Active Problem List    Diagnosis Date Noted    Hypertension 03/31/2023    Chest pain in adult 03/30/2023    Bipolar 1 disorder ([facility] [address] 01/25/2020        Discharge Medications:         Medication List        START taking these medications      LORazepam 0.5 MG tablet  Commonly known as: Ativan  Take 1 tablet by mouth nightly as needed for Anxiety for up to 30 days. Max Daily Amount: 0.5 mg     * albuterol (2.5 MG/3ML) 0.083% nebulizer solution  Commonly known as: PROVENTIL     * Ventolin  (90 Base) MCG/ACT inhaler  Generic drug: albuterol sulfate HFA           * This list has 2 medication(s) that are the same as other medications prescribed for you. Read the directions carefully, and ask your doctor or other care provider to review them with you.                 CONTINUE taking these

## 2023-03-31 NOTE — PROGRESS NOTES
Entered patient's room for afternoon head to toe reassessment. Patient resting in the bed at this time. A&O x4, calm, and cooperative. Patient still complains of some chest discomfort that comes and goes. Head to toe reassessment completed at this time, see flowsheets for more details. Patient denies no more needs at this time. Call light within reach. Bed alarm on. Bed wheels locked. Bed in lowest position.

## 2023-04-03 ENCOUNTER — TELEPHONE (OUTPATIENT)
Dept: PRIMARY CARE CLINIC | Age: 58
End: 2023-04-03

## 2023-04-03 NOTE — TELEPHONE ENCOUNTER
[address] Transitions Initial Follow Up Call    Outreach made within 2 business days of discharge: Yes    Patient: Cayetano Ventura Patient : 1965   MRN: [id number]  Reason for Admission: There are no discharge diagnoses documented for the most recent discharge. Discharge Date: 3/31/23       Spoke with: [facility]    Discharge department/facility: [facility]     TCM Interactive Patient Contact:  Was patient able to fill all prescriptions: Yes  Was patient instructed to bring all medications to the follow-up visit: Yes  Is patient taking all medications as directed in the discharge summary?  Yes  Does patient understand their discharge instructions: Yes  Does patient have questions or concerns that need addressed prior to 7-14 day follow up office visit: no    Scheduled appointment with PCP within 7-14 days    Follow Up  Future Appointments   Date Time Provider Vinnie Mcgovern   2023  9:20 AM AIMEE Nguyen CNP [facility]   5/10/2023  8:30 AM [facility] MAMMOGRAPHY ROOM AT [facility]   2023  9:40 AM AIMEE Nguyen CNP [facility]       Vince Whitt MA

## 2023-04-03 NOTE — PROCEDURES
[address], [place] [address]                              CARDIAC STRESS TEST    PATIENT NAME: Bertha Segundo                   :        1965  MED REC NO:   [id number]                              ROOM:       0319  ACCOUNT NO:   [de-identified]                           ADMIT DATE: 2023  PROVIDER:     Carie Upton MD    CARDIOVASCULAR DIAGNOSTIC DEPARTMENT    DATE OF STUDY:  2023    ORDERING PROVIDER:  Sadie Hobbs. Dhara Martinez MD    PRIMARY CARE PROVIDER:  Morteza Villalobos. Giancarlo Swanson CNP    INTERPRETING PHYSICIAN:  Carie Upton MD    EXERCISE MYOCARDIAL PERFUSION STRESS TEST REPORT    Rest/Stress single-isotope SPECT imaging with exercise stress and gated  SPECT imaging    INDICATION:  Assessment of recent chest pain and/or discomfort. CLINICAL HISTORY:  The patient is a 76-year-old woman with no known  coronary artery disease. Previous cardiac history includes:  None. Other previous history includes:  Asthma, cerebrovascular accident,  arthritis, caffeine, hypertension. Symptoms just prior to testing included:  None. Relevant medications:  Metoprolol (Toprol). PROCEDURE:  The patient performed treadmill exercise using a Norris  protocol, completing 2:32 minutes and completing an estimated workload  of 9.79 metabolic equivalents (METS). The test was terminated due to fatigue and shortness of breath. The heart rate was 97 beats per minute at baseline and increased to 151  beats per minute at peak exercise, which was 93% of the maximum  predicted heart rate. The rest blood pressure was 136/70 mmHg and  increased to 158/80 mmHg, which is a normal response. During the  procedure, the patient developed fatigue, shortness of breath and leg  fatigue, but denied any chest discomfort. Myocardial perfusion imaging: Imaging was performed at rest 30-45  minutes following the injection of 10 mCi of sestamibi.   At

## 2023-04-17 ENCOUNTER — OFFICE VISIT (OUTPATIENT)
Dept: PRIMARY CARE CLINIC | Age: 58
End: 2023-04-17
Payer: MEDICAID

## 2023-04-17 VITALS
SYSTOLIC BLOOD PRESSURE: 102 MMHG | RESPIRATION RATE: 16 BRPM | DIASTOLIC BLOOD PRESSURE: 72 MMHG | BODY MASS INDEX: 41.5 KG/M2 | TEMPERATURE: 98.8 F | OXYGEN SATURATION: 98 % | WEIGHT: 241.8 LBS | HEART RATE: 71 BPM

## 2023-04-17 DIAGNOSIS — K52.9 GASTROENTERITIS: Primary | ICD-10-CM

## 2023-04-17 DIAGNOSIS — J02.9 PHARYNGITIS, UNSPECIFIED ETIOLOGY: ICD-10-CM

## 2023-04-17 DIAGNOSIS — J06.9 VIRAL UPPER RESPIRATORY TRACT INFECTION: ICD-10-CM

## 2023-04-17 LAB — S PYO AG THROAT QL: NORMAL

## 2023-04-17 PROCEDURE — 87880 STREP A ASSAY W/OPTIC: CPT | Performed by: NURSE PRACTITIONER

## 2023-04-17 PROCEDURE — 99213 OFFICE O/P EST LOW 20 MIN: CPT | Performed by: NURSE PRACTITIONER

## 2023-04-17 PROCEDURE — 3078F DIAST BP <80 MM HG: CPT | Performed by: NURSE PRACTITIONER

## 2023-04-17 PROCEDURE — 3074F SYST BP LT 130 MM HG: CPT | Performed by: NURSE PRACTITIONER

## 2023-04-17 RX ORDER — FLUTICASONE PROPIONATE 50 MCG
2 SPRAY, SUSPENSION (ML) NASAL DAILY
Qty: 48 G | Refills: 1 | Status: SHIPPED | OUTPATIENT
Start: 2023-04-17

## 2023-04-17 ASSESSMENT — PATIENT HEALTH QUESTIONNAIRE - PHQ9
SUM OF ALL RESPONSES TO PHQ QUESTIONS 1-9: 0
5. POOR APPETITE OR OVEREATING: 0
4. FEELING TIRED OR HAVING LITTLE ENERGY: 0
2. FEELING DOWN, DEPRESSED OR HOPELESS: 0
SUM OF ALL RESPONSES TO PHQ QUESTIONS 1-9: 0
7. TROUBLE CONCENTRATING ON THINGS, SUCH AS READING THE NEWSPAPER OR WATCHING TELEVISION: 0
3. TROUBLE FALLING OR STAYING ASLEEP: 0
6. FEELING BAD ABOUT YOURSELF - OR THAT YOU ARE A FAILURE OR HAVE LET YOURSELF OR YOUR FAMILY DOWN: 0
9. THOUGHTS THAT YOU WOULD BE BETTER OFF DEAD, OR OF HURTING YOURSELF: 0
SUM OF ALL RESPONSES TO PHQ QUESTIONS 1-9: 0
1. LITTLE INTEREST OR PLEASURE IN DOING THINGS: 0
SUM OF ALL RESPONSES TO PHQ QUESTIONS 1-9: 0
8. MOVING OR SPEAKING SO SLOWLY THAT OTHER PEOPLE COULD HAVE NOTICED. OR THE OPPOSITE, BEING SO FIGETY OR RESTLESS THAT YOU HAVE BEEN MOVING AROUND A LOT MORE THAN USUAL: 0
SUM OF ALL RESPONSES TO PHQ9 QUESTIONS 1 & 2: 0
10. IF YOU CHECKED OFF ANY PROBLEMS, HOW DIFFICULT HAVE THESE PROBLEMS MADE IT FOR YOU TO DO YOUR WORK, TAKE CARE OF THINGS AT HOME, OR GET ALONG WITH OTHER PEOPLE: 0

## 2023-04-17 ASSESSMENT — ENCOUNTER SYMPTOMS
ABDOMINAL PAIN: 1
VOMITING: 1
COUGH: 1
SHORTNESS OF BREATH: 0
ALLERGIC/IMMUNOLOGIC NEGATIVE: 1
RHINORRHEA: 0
DIARRHEA: 0
EYES NEGATIVE: 1
WHEEZING: 0
NAUSEA: 1
SORE THROAT: 1

## 2023-04-17 NOTE — PATIENT INSTRUCTIONS
SURVEY:     You may be receiving a survey from [employer] regarding your visit today. Please complete the survey to enable us to provide the highest quality of care to you and your family. If you cannot score us a very good on any question, please call the office to discuss how we could have made your experience a very good one.      Thank you,    Carlos Schneider, APRN-CNP  Shara Buchanan, APRN-CNP  Jah Rg, DENISEN  Young Naik, MELVA Wesley, MELVA Mckeon, CMA  Leslie, PCA  Sonia, PM

## 2023-04-17 NOTE — PROGRESS NOTES
External ear normal.      Nose: Rhinorrhea present. No mucosal edema or congestion. Rhinorrhea is clear. Mouth/Throat:      Lips: Pink. Mouth: Mucous membranes are moist.      Pharynx: Oropharynx is clear. Posterior oropharyngeal erythema (mild) present. Eyes:      Extraocular Movements: Extraocular movements intact. Conjunctiva/sclera: Conjunctivae normal.      Pupils: Pupils are equal, round, and reactive to light. Cardiovascular:      Rate and Rhythm: Normal rate and regular rhythm. Heart sounds: Normal heart sounds. No murmur heard. Pulmonary:      Effort: Pulmonary effort is normal.      Breath sounds: Normal breath sounds. No wheezing. Abdominal:      General: Bowel sounds are increased. Tenderness: There is no abdominal tenderness. Musculoskeletal:         General: Normal range of motion. Cervical back: Normal range of motion and neck supple. Skin:     General: Skin is warm. Capillary Refill: Capillary refill takes less than 2 seconds. Neurological:      General: No focal deficit present. Mental Status: She is alert and oriented to person, place, and time. Psychiatric:         Mood and Affect: Mood normal.         Behavior: Behavior normal.         Thought Content:  Thought content normal.         Judgment: Judgment normal.       Data:     Lab Results   Component Value Date/Time     03/30/2023 10:15 PM    K 3.7 03/30/2023 10:15 PM     03/30/2023 10:15 PM    CO2 27 03/30/2023 10:15 PM    BUN 12 03/30/2023 10:15 PM    CREATININE 0.72 03/30/2023 10:15 PM    GLUCOSE 132 03/30/2023 10:15 PM    PROT 7.4 03/30/2023 10:15 PM    LABALBU 4.0 03/30/2023 10:15 PM    LABALBU 4.4 11/18/2011 10:06 PM    BILITOT 0.3 03/30/2023 10:15 PM    ALKPHOS 85 03/30/2023 10:15 PM    AST 14 03/30/2023 10:15 PM    ALT 10 03/30/2023 10:15 PM     Lab Results   Component Value Date/Time    WBC 9.6 03/31/2023 05:50 AM    RBC 4.62 03/31/2023 05:50 AM    RBC 4.70 11/18/2011

## 2023-04-18 ENCOUNTER — OFFICE VISIT (OUTPATIENT)
Dept: OBGYN | Age: 58
End: 2023-04-18

## 2023-04-18 VITALS
WEIGHT: 240 LBS | SYSTOLIC BLOOD PRESSURE: 131 MMHG | HEIGHT: 64 IN | BODY MASS INDEX: 40.97 KG/M2 | DIASTOLIC BLOOD PRESSURE: 78 MMHG

## 2023-04-18 DIAGNOSIS — N90.7 INCLUSION CYST OF VULVA: Primary | ICD-10-CM

## 2023-04-21 ENCOUNTER — TELEPHONE (OUTPATIENT)
Dept: PRIMARY CARE CLINIC | Age: 58
End: 2023-04-21

## 2023-04-26 ENCOUNTER — APPOINTMENT (OUTPATIENT)
Dept: GENERAL RADIOLOGY | Age: 58
End: 2023-04-26
Payer: MEDICAID

## 2023-04-26 ENCOUNTER — TELEPHONE (OUTPATIENT)
Dept: PRIMARY CARE CLINIC | Age: 58
End: 2023-04-26

## 2023-04-26 ENCOUNTER — HOSPITAL ENCOUNTER (EMERGENCY)
Age: 58
Discharge: HOME | End: 2023-04-26
Attending: EMERGENCY MEDICINE
Payer: MEDICAID

## 2023-04-26 VITALS
DIASTOLIC BLOOD PRESSURE: 89 MMHG | SYSTOLIC BLOOD PRESSURE: 138 MMHG | TEMPERATURE: 97.9 F | OXYGEN SATURATION: 100 % | HEART RATE: 70 BPM | RESPIRATION RATE: 17 BRPM

## 2023-04-26 DIAGNOSIS — J44.1 COPD EXACERBATION (HCC): Primary | ICD-10-CM

## 2023-04-26 PROCEDURE — 99283 EMERGENCY DEPT VISIT LOW MDM: CPT

## 2023-04-26 PROCEDURE — 71046 X-RAY EXAM CHEST 2 VIEWS: CPT

## 2023-04-26 RX ORDER — AZITHROMYCIN 250 MG/1
TABLET, FILM COATED ORAL
Qty: 1 PACKET | Refills: 0 | Status: SHIPPED | OUTPATIENT
Start: 2023-04-26 | End: 2023-04-30

## 2023-04-26 RX ORDER — BENZONATATE 100 MG/1
100 CAPSULE ORAL 3 TIMES DAILY PRN
Qty: 9 CAPSULE | Refills: 0 | Status: SHIPPED | OUTPATIENT
Start: 2023-04-26 | End: 2023-04-29

## 2023-04-26 RX ORDER — PREDNISONE 20 MG/1
40 TABLET ORAL DAILY
Qty: 10 TABLET | Refills: 0 | Status: SHIPPED | OUTPATIENT
Start: 2023-04-26 | End: 2023-05-01

## 2023-04-26 NOTE — TELEPHONE ENCOUNTER
[facility] Transitions Initial Follow Up Call    Outreach made within 2 business days of discharge: Yes    Patient: Tanisha Coleman Patient : 1965   MRN: [id number]  Reason for Admission: There are no discharge diagnoses documented for the most recent discharge.  Discharge Date: 23       Spoke with: sana for patient     Discharge department/facility: [facility]     TCM Interactive Patient Contact:  Was patient able to fill all prescriptions:   Was patient instructed to bring all medications to the follow-up visit:   Is patient taking all medications as directed in the discharge summary?   Does patient understand their discharge instructions:   Does patient have questions or concerns that need addressed prior to 7-14 day follow up office visit:     Scheduled appointment with PCP within 7-14 days    Follow Up  Future Appointments   Date Time Provider Department Center   2023  9:00 AM AIMEE Elliott CNP [facility]   5/10/2023  8:30 AM [facility] MAMMOGRAPHY ROOM AT [facility]   2023  9:20 AM AIMEE Elliott CNP [facility]   2023  9:40 AM AIMEE Elliott CNPf [facility]       Linda Johnston MA

## 2023-04-26 NOTE — ED PROVIDER NOTES
J[address] [place] 64      Pt Name: Katherin Dubois  MRN: [id number]  Caitlin 1965  Date of evaluation: 2023  Provider: Mis Rabago MD    [address]       Chief Complaint   Patient presents with    Cough     Productive cough x2 weeks, worsening over last days. States history of bronchitis. Was seen by PCP 2 weeks ago and diagnosed with viral URI and states was not given antibiotics as PCP \"wanted to see if it would turn into bronchitis\"         HISTORY OF PRESENT ILLNESS      Tanisha Sellers is a 65 y.o. female who presents to the emergency department for evaluation of cough. States that she had a febrile illness about 2 weeks ago associated with sore throat. This resolved, though she did have some increased mucus production at that time. Beginning about a week ago she had onset of a cough, which is increased in frequency and intensity. She notes that the cough is productive of yellow/green sputum. She also has had a few episodes of posttussive emesis secondary to intense coughing. When having these coughing episodes she does have some dyspnea but otherwise denies any intervening dyspnea or chest pain. No fever since the onset of illness 2 weeks ago. No abdominal pain. No lower extremity swelling or pain. No other complaints. States that she chose to come in today because she was sleeping and woke up with coughing and \"a lot of mucus. \"        PAST MEDICAL HISTORY     Past Medical History:   Diagnosis Date    Arthritis     Asthma     COPD (chronic obstructive pulmonary disease) ([facility] [address]     Eczema of face     Genital warts     Hypertension     Psychiatric problem     Restless legs     Stroke [facility])          SURGICAL HISTORY       Past Surgical History:   Procedure Laterality Date    APPENDECTOMY      BACK SURGERY       SECTION      CHOLECYSTECTOMY      KIDNEY SURGERY Right     LUMBAR FUSION      TONSILLECTOMY      TOTAL KNEE ARTHROPLASTY Left

## 2023-04-26 NOTE — DISCHARGE INSTRUCTIONS
Turn to the emergency department if you develop difficulty breathing, chest pain, fever or any other concerns. Follow-up with your PCP within the next 2 days if your symptoms or not improving.

## 2023-04-27 ENCOUNTER — OFFICE VISIT (OUTPATIENT)
Dept: PRIMARY CARE CLINIC | Age: 58
End: 2023-04-27

## 2023-04-27 VITALS
TEMPERATURE: 98.5 F | DIASTOLIC BLOOD PRESSURE: 82 MMHG | WEIGHT: 240.6 LBS | OXYGEN SATURATION: 98 % | BODY MASS INDEX: 41.3 KG/M2 | HEART RATE: 64 BPM | RESPIRATION RATE: 18 BRPM | SYSTOLIC BLOOD PRESSURE: 124 MMHG

## 2023-04-27 DIAGNOSIS — J44.1 COPD WITH ACUTE EXACERBATION (HCC): Primary | ICD-10-CM

## 2023-04-27 DIAGNOSIS — R07.9 CHEST PAIN, UNSPECIFIED TYPE: ICD-10-CM

## 2023-04-27 DIAGNOSIS — F41.9 ANXIETY: ICD-10-CM

## 2023-04-27 RX ORDER — BUDESONIDE, GLYCOPYRROLATE, AND FORMOTEROL FUMARATE 160; 9; 4.8 UG/1; UG/1; UG/1
2 AEROSOL, METERED RESPIRATORY (INHALATION) 2 TIMES DAILY
Qty: 1 EACH | Refills: 3 | Status: SHIPPED | OUTPATIENT
Start: 2023-04-27 | End: 2023-05-27

## 2023-04-27 RX ORDER — LORAZEPAM 0.5 MG/1
0.5 TABLET ORAL NIGHTLY PRN
Qty: 30 TABLET | Refills: 0 | Status: SHIPPED | OUTPATIENT
Start: 2023-04-27 | End: 2023-05-27

## 2023-04-27 ASSESSMENT — PATIENT HEALTH QUESTIONNAIRE - PHQ9
1. LITTLE INTEREST OR PLEASURE IN DOING THINGS: 0
4. FEELING TIRED OR HAVING LITTLE ENERGY: 0
3. TROUBLE FALLING OR STAYING ASLEEP: 0
SUM OF ALL RESPONSES TO PHQ QUESTIONS 1-9: 0
7. TROUBLE CONCENTRATING ON THINGS, SUCH AS READING THE NEWSPAPER OR WATCHING TELEVISION: 0
SUM OF ALL RESPONSES TO PHQ QUESTIONS 1-9: 0
6. FEELING BAD ABOUT YOURSELF - OR THAT YOU ARE A FAILURE OR HAVE LET YOURSELF OR YOUR FAMILY DOWN: 0
8. MOVING OR SPEAKING SO SLOWLY THAT OTHER PEOPLE COULD HAVE NOTICED. OR THE OPPOSITE, BEING SO FIGETY OR RESTLESS THAT YOU HAVE BEEN MOVING AROUND A LOT MORE THAN USUAL: 0
SUM OF ALL RESPONSES TO PHQ9 QUESTIONS 1 & 2: 0
SUM OF ALL RESPONSES TO PHQ QUESTIONS 1-9: 0
9. THOUGHTS THAT YOU WOULD BE BETTER OFF DEAD, OR OF HURTING YOURSELF: 0
5. POOR APPETITE OR OVEREATING: 0
10. IF YOU CHECKED OFF ANY PROBLEMS, HOW DIFFICULT HAVE THESE PROBLEMS MADE IT FOR YOU TO DO YOUR WORK, TAKE CARE OF THINGS AT HOME, OR GET ALONG WITH OTHER PEOPLE: 0
2. FEELING DOWN, DEPRESSED OR HOPELESS: 0
SUM OF ALL RESPONSES TO PHQ QUESTIONS 1-9: 0

## 2023-04-27 ASSESSMENT — ENCOUNTER SYMPTOMS
GASTROINTESTINAL NEGATIVE: 1
WHEEZING: 1
EYES NEGATIVE: 1
COUGH: 1
SHORTNESS OF BREATH: 1
ALLERGIC/IMMUNOLOGIC NEGATIVE: 1
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
## 2023-04-27 NOTE — PATIENT INSTRUCTIONS
SURVEY:     You may be receiving a survey from [employer] regarding your visit today. Please complete the survey to enable us to provide the highest quality of care to you and your family. If you cannot score us a very good on any question, please call the office to discuss how we could have made your experience a very good one.      Thank you,    Ani Schneider, APRN-CINDY Miller, APRN-CNP  Paola Ward, LAURA Mccartney, MELVA Barragan, CMA  Linda, CMA  Leslie, PCA  Sonia, PM

## 2023-05-02 ENCOUNTER — TELEPHONE (OUTPATIENT)
Dept: PRIMARY CARE CLINIC | Age: 58
End: 2023-05-02

## 2023-05-02 NOTE — TELEPHONE ENCOUNTER
[facility] Kay Andrade is requesting an order for home health PT.       Please review fax below:

## 2023-05-03 DIAGNOSIS — J44.1 COPD WITH ACUTE EXACERBATION (HCC): Primary | ICD-10-CM

## 2023-05-03 DIAGNOSIS — R53.1 WEAKNESS: ICD-10-CM

## 2023-05-05 ENCOUNTER — TELEPHONE (OUTPATIENT)
Dept: PRIMARY CARE CLINIC | Age: 58
End: 2023-05-05

## 2023-05-10 ENCOUNTER — TELEPHONE (OUTPATIENT)
Dept: PRIMARY CARE CLINIC | Age: 58
End: 2023-05-10

## 2023-05-10 ENCOUNTER — HOSPITAL ENCOUNTER (OUTPATIENT)
Dept: WOMENS IMAGING | Age: 58
Discharge: HOME | End: 2023-05-12
Payer: MEDICAID

## 2023-05-10 DIAGNOSIS — Z12.31 OTHER SCREENING MAMMOGRAM: ICD-10-CM

## 2023-05-10 PROCEDURE — 77063 BREAST TOMOSYNTHESIS BI: CPT

## 2023-05-10 NOTE — TELEPHONE ENCOUNTER
----- Message from AIMEE Sprague CNP sent at 5/10/2023 10:46 AM EDT -----  Please notify patient of normal mammogram results.   Thanks Gurdeep's

## 2023-05-11 ENCOUNTER — OFFICE VISIT (OUTPATIENT)
Dept: PRIMARY CARE CLINIC | Age: 58
End: 2023-05-11
Payer: MEDICAID

## 2023-05-11 VITALS
WEIGHT: 243.2 LBS | TEMPERATURE: 99.6 F | BODY MASS INDEX: 41.75 KG/M2 | DIASTOLIC BLOOD PRESSURE: 88 MMHG | HEART RATE: 75 BPM | OXYGEN SATURATION: 99 % | SYSTOLIC BLOOD PRESSURE: 122 MMHG | RESPIRATION RATE: 18 BRPM

## 2023-05-11 DIAGNOSIS — F41.9 ANXIETY: Primary | ICD-10-CM

## 2023-05-11 DIAGNOSIS — F31.9 BIPOLAR 1 DISORDER (HCC): ICD-10-CM

## 2023-05-11 DIAGNOSIS — G25.81 RLS (RESTLESS LEGS SYNDROME): ICD-10-CM

## 2023-05-11 DIAGNOSIS — R06.83 LOUD SNORING: ICD-10-CM

## 2023-05-11 PROCEDURE — 3079F DIAST BP 80-89 MM HG: CPT | Performed by: NURSE PRACTITIONER

## 2023-05-11 PROCEDURE — 99214 OFFICE O/P EST MOD 30 MIN: CPT | Performed by: NURSE PRACTITIONER

## 2023-05-11 PROCEDURE — 3074F SYST BP LT 130 MM HG: CPT | Performed by: NURSE PRACTITIONER

## 2023-05-11 RX ORDER — SERTRALINE HYDROCHLORIDE 25 MG/1
25 TABLET, FILM COATED ORAL DAILY
Qty: 30 TABLET | Refills: 3 | Status: SHIPPED | OUTPATIENT
Start: 2023-05-11

## 2023-05-11 RX ORDER — BUPROPION HYDROCHLORIDE 300 MG/1
300 TABLET ORAL EVERY MORNING
Qty: 30 TABLET | Refills: 0 | Status: SHIPPED | OUTPATIENT
Start: 2023-05-11 | End: 2023-06-10

## 2023-05-11 ASSESSMENT — PATIENT HEALTH QUESTIONNAIRE - PHQ9
1. LITTLE INTEREST OR PLEASURE IN DOING THINGS: 0
2. FEELING DOWN, DEPRESSED OR HOPELESS: 0
3. TROUBLE FALLING OR STAYING ASLEEP: 0
9. THOUGHTS THAT YOU WOULD BE BETTER OFF DEAD, OR OF HURTING YOURSELF: 0
4. FEELING TIRED OR HAVING LITTLE ENERGY: 0
SUM OF ALL RESPONSES TO PHQ QUESTIONS 1-9: 0
7. TROUBLE CONCENTRATING ON THINGS, SUCH AS READING THE NEWSPAPER OR WATCHING TELEVISION: 0
SUM OF ALL RESPONSES TO PHQ QUESTIONS 1-9: 0
6. FEELING BAD ABOUT YOURSELF - OR THAT YOU ARE A FAILURE OR HAVE LET YOURSELF OR YOUR FAMILY DOWN: 0
5. POOR APPETITE OR OVEREATING: 0
SUM OF ALL RESPONSES TO PHQ QUESTIONS 1-9: 0
SUM OF ALL RESPONSES TO PHQ QUESTIONS 1-9: 0
SUM OF ALL RESPONSES TO PHQ9 QUESTIONS 1 & 2: 0
8. MOVING OR SPEAKING SO SLOWLY THAT OTHER PEOPLE COULD HAVE NOTICED. OR THE OPPOSITE, BEING SO FIGETY OR RESTLESS THAT YOU HAVE BEEN MOVING AROUND A LOT MORE THAN USUAL: 0
10. IF YOU CHECKED OFF ANY PROBLEMS, HOW DIFFICULT HAVE THESE PROBLEMS MADE IT FOR YOU TO DO YOUR WORK, TAKE CARE OF THINGS AT HOME, OR GET ALONG WITH OTHER PEOPLE: 0

## 2023-05-11 ASSESSMENT — ENCOUNTER SYMPTOMS
GASTROINTESTINAL NEGATIVE: 1
EYES NEGATIVE: 1
RESPIRATORY NEGATIVE: 1

## 2023-05-11 NOTE — PATIENT INSTRUCTIONS
SURVEY:     You may be receiving a survey from [employer] regarding your visit today. Please complete the survey to enable us to provide the highest quality of care to you and your family. If you cannot score us a very good on any question, please call the office to discuss how we could have made your experience a very good one.      Thank you,    Mile Schneidre, APRN-CINDY Amezquita, APRN-CNP  Leonardo Disla, LAURA Maloney Junior, MELVA To, MELVA Mckeon, MELVA Nelson, DEANDRE Scott, PM

## 2023-05-11 NOTE — PROGRESS NOTES
Hematological: Negative. Psychiatric/Behavioral:  Positive for dysphoric mood and sleep disturbance. The patient is nervous/anxious. Physical Exam:   Vitals:  /88   Pulse 75   Temp 99.6 °F (37.6 °C) (Temporal)   Resp 18   Wt 243 lb 3.2 oz (110.3 kg)   SpO2 99%   BMI 41.75 kg/m²     Physical Exam  Vitals and nursing note reviewed. Constitutional:       General: She is not in acute distress. Appearance: Normal appearance. She is obese. HENT:      Head: Normocephalic. Right Ear: External ear normal.      Left Ear: External ear normal.      Nose: Nose normal.      Mouth/Throat:      Mouth: Mucous membranes are moist.      Pharynx: Oropharynx is clear. Eyes:      Extraocular Movements: Extraocular movements intact. Conjunctiva/sclera: Conjunctivae normal.      Pupils: Pupils are equal, round, and reactive to light. Cardiovascular:      Rate and Rhythm: Normal rate and regular rhythm. Heart sounds: Normal heart sounds. No murmur heard. Pulmonary:      Effort: Pulmonary effort is normal.      Breath sounds: Normal breath sounds. Musculoskeletal:      Cervical back: Normal range of motion and neck supple. Comments: Chronic pain in legs and back     Skin:     General: Skin is warm. Capillary Refill: Capillary refill takes less than 2 seconds. Neurological:      General: No focal deficit present. Mental Status: She is alert and oriented to person, place, and time. Psychiatric:         Attention and Perception: Attention normal.         Mood and Affect: Affect is tearful. Speech: Speech normal.         Behavior: Behavior normal. Behavior is cooperative. Thought Content:  Thought content normal.         Cognition and Memory: Cognition normal.       Data:     Lab Results   Component Value Date/Time     03/30/2023 10:15 PM    K 3.7 03/30/2023 10:15 PM     03/30/2023 10:15 PM    CO2 27 03/30/2023 10:15 PM    BUN 12 03/30/2023 10:15 PM

## 2023-06-14 NOTE — PROGRESS NOTES
Physical Therapy    Facility/Department: [facility]  Initial Assessment    NAME: Anand Urbina  : 1965  MRN: [id number]    Date of Service: 2020    Discharge Recommendations:  Patient would benefit from continued therapy after discharge   PT Equipment Recommendations  Equipment Needed: No    Assessment   Body structures, Functions, Activity limitations: Decreased ROM; Decreased strength;Decreased balance  Assessment: Decreased gait, ROM, strength post L TKA  Decision Making: Low Complexity  History: L TKA  Exam: decreased ROM, strength, gait  Clinical Presentation: stable  REQUIRES PT FOLLOW UP: Yes       Patient Diagnosis(es): There were no encounter diagnoses. has a past medical history of Arthritis, Asthma, COPD (chronic obstructive pulmonary disease) ([address] Hypertension, Psychiatric problem, and Restless legs. has a past surgical history that includes back surgery and lumbar fusion.             Subjective  General  Family / Caregiver Present: No  Follows Commands: Within Functional Limits  General Comment  Comments: pt had L TKA in 2019  Subjective  Subjective: pt states \" my IT band is very tight\"  Pain Screening  Patient Currently in Pain: Yes  Pain Assessment  Pain Assessment: 0-10  Pain Level: 8  Pain Type: Chronic pain  Pain Location: Knee  Pain Orientation: Left  Pain Descriptors: Tightness;Squeezing  Pain Frequency: Continuous  Vital Signs  Patient Currently in Pain: Yes       Orientation  Orientation  Overall Orientation Status: Within Functional Limits  Social/Functional History  Social/Functional History  ADL Assistance: Independent  Ambulation Assistance: Independent(intermittent use of cane)  Transfer Assistance: Independent  Additional Comments: pt states she has no place to live- states \"kicked-out \" of the camper she was staying in due to losing her job and not able to pay rent       Objective     Observation/Palpation  Palpation: + tenderness L Lateral thigh    AROM RLE b/l ankle pain & swelling since sunday  says he has "been standing a lot"

## 2023-06-19 ENCOUNTER — OFFICE VISIT (OUTPATIENT)
Dept: PRIMARY CARE CLINIC | Age: 58
End: 2023-06-19
Payer: MEDICAID

## 2023-06-19 VITALS
HEART RATE: 79 BPM | WEIGHT: 241.8 LBS | RESPIRATION RATE: 16 BRPM | OXYGEN SATURATION: 98 % | DIASTOLIC BLOOD PRESSURE: 82 MMHG | TEMPERATURE: 98.5 F | SYSTOLIC BLOOD PRESSURE: 124 MMHG | BODY MASS INDEX: 41.5 KG/M2

## 2023-06-19 DIAGNOSIS — F41.9 ANXIETY: ICD-10-CM

## 2023-06-19 DIAGNOSIS — F31.9 BIPOLAR 1 DISORDER (HCC): ICD-10-CM

## 2023-06-19 PROCEDURE — 3079F DIAST BP 80-89 MM HG: CPT | Performed by: NURSE PRACTITIONER

## 2023-06-19 PROCEDURE — 3074F SYST BP LT 130 MM HG: CPT | Performed by: NURSE PRACTITIONER

## 2023-06-19 PROCEDURE — 99214 OFFICE O/P EST MOD 30 MIN: CPT | Performed by: NURSE PRACTITIONER

## 2023-06-19 RX ORDER — LORAZEPAM 0.5 MG/1
0.5 TABLET ORAL 2 TIMES DAILY PRN
Qty: 60 TABLET | Refills: 0 | Status: SHIPPED | OUTPATIENT
Start: 2023-06-19 | End: 2023-08-18

## 2023-06-19 ASSESSMENT — ENCOUNTER SYMPTOMS
GASTROINTESTINAL NEGATIVE: 1
BACK PAIN: 1
EYES NEGATIVE: 1
RESPIRATORY NEGATIVE: 1
ALLERGIC/IMMUNOLOGIC NEGATIVE: 1

## 2023-06-19 ASSESSMENT — PATIENT HEALTH QUESTIONNAIRE - PHQ9
7. TROUBLE CONCENTRATING ON THINGS, SUCH AS READING THE NEWSPAPER OR WATCHING TELEVISION: 0
2. FEELING DOWN, DEPRESSED OR HOPELESS: 0
SUM OF ALL RESPONSES TO PHQ9 QUESTIONS 1 & 2: 0
SUM OF ALL RESPONSES TO PHQ QUESTIONS 1-9: 0
8. MOVING OR SPEAKING SO SLOWLY THAT OTHER PEOPLE COULD HAVE NOTICED. OR THE OPPOSITE, BEING SO FIGETY OR RESTLESS THAT YOU HAVE BEEN MOVING AROUND A LOT MORE THAN USUAL: 0
6. FEELING BAD ABOUT YOURSELF - OR THAT YOU ARE A FAILURE OR HAVE LET YOURSELF OR YOUR FAMILY DOWN: 0
1. LITTLE INTEREST OR PLEASURE IN DOING THINGS: 0
3. TROUBLE FALLING OR STAYING ASLEEP: 0
4. FEELING TIRED OR HAVING LITTLE ENERGY: 0
10. IF YOU CHECKED OFF ANY PROBLEMS, HOW DIFFICULT HAVE THESE PROBLEMS MADE IT FOR YOU TO DO YOUR WORK, TAKE CARE OF THINGS AT HOME, OR GET ALONG WITH OTHER PEOPLE: 0
SUM OF ALL RESPONSES TO PHQ QUESTIONS 1-9: 0
9. THOUGHTS THAT YOU WOULD BE BETTER OFF DEAD, OR OF HURTING YOURSELF: 0
5. POOR APPETITE OR OVEREATING: 0
SUM OF ALL RESPONSES TO PHQ QUESTIONS 1-9: 0
SUM OF ALL RESPONSES TO PHQ QUESTIONS 1-9: 0

## 2023-06-19 NOTE — PROGRESS NOTES
Conjunctiva/sclera: Conjunctivae normal.      Pupils: Pupils are equal, round, and reactive to light. Cardiovascular:      Rate and Rhythm: Normal rate and regular rhythm. Heart sounds: Normal heart sounds. Pulmonary:      Effort: Pulmonary effort is normal.      Breath sounds: Normal breath sounds. Musculoskeletal:      Cervical back: Normal range of motion and neck supple. Skin:     General: Skin is warm. Capillary Refill: Capillary refill takes less than 2 seconds. Neurological:      General: No focal deficit present. Mental Status: She is alert and oriented to person, place, and time. Psychiatric:         Attention and Perception: Attention normal.         Mood and Affect: Mood normal. Affect is tearful. Speech: Speech normal.         Behavior: Behavior normal. Behavior is cooperative. Thought Content: Thought content normal.       Data:     Lab Results   Component Value Date/Time     03/30/2023 10:15 PM    K 3.7 03/30/2023 10:15 PM     03/30/2023 10:15 PM    CO2 27 03/30/2023 10:15 PM    BUN 12 03/30/2023 10:15 PM    CREATININE 0.72 03/30/2023 10:15 PM    GLUCOSE 132 03/30/2023 10:15 PM    PROT 7.4 03/30/2023 10:15 PM    LABALBU 4.0 03/30/2023 10:15 PM    LABALBU 4.4 11/18/2011 10:06 PM    BILITOT 0.3 03/30/2023 10:15 PM    ALKPHOS 85 03/30/2023 10:15 PM    AST 14 03/30/2023 10:15 PM    ALT 10 03/30/2023 10:15 PM     Lab Results   Component Value Date/Time    WBC 9.6 03/31/2023 05:50 AM    RBC 4.62 03/31/2023 05:50 AM    RBC 4.70 11/18/2011 10:06 PM    HGB 12.6 03/31/2023 05:50 AM    HCT 40.1 03/31/2023 05:50 AM    MCV 86.8 03/31/2023 05:50 AM    MCH 27.3 03/31/2023 05:50 AM    MCHC 31.4 03/31/2023 05:50 AM    RDW 14.3 03/31/2023 05:50 AM     03/31/2023 05:50 AM    MPV 10.1 03/31/2023 05:50 AM     No results found for: TSH  No results found for: CHOL, LDL, HDL, PSA, LABA1C    Assessment/Plan:      Diagnosis Orders   1.  Anxiety  sertraline (ZOLOFT) 50

## 2023-06-19 NOTE — PATIENT INSTRUCTIONS
SURVEY:     You may be receiving a survey from [employer] regarding your visit today. Please complete the survey to enable us to provide the highest quality of care to you and your family. If you cannot score us a very good on any question, please call the office to discuss how we could have made your experience a very good one.      Thank you,    Ana Schneider, APRN-CNP  Thais Stoner, APRN-CNP  Evelina Ortez, LAURA Segura, MELVA Elizabeth, MLEVA Mckeon, CMA  Leslie, PCA  Sonia, PM

## 2023-06-29 ENCOUNTER — HOSPITAL ENCOUNTER (OUTPATIENT)
Dept: MRI IMAGING | Age: 58
Discharge: HOME | End: 2023-07-01

## 2023-06-29 DIAGNOSIS — F31.9 BIPOLAR 1 DISORDER (HCC): ICD-10-CM

## 2023-06-29 DIAGNOSIS — Z98.1 HISTORY OF FUSION OF LUMBAR SPINE: ICD-10-CM

## 2023-06-29 DIAGNOSIS — F41.9 ANXIETY: ICD-10-CM

## 2023-06-29 DIAGNOSIS — M54.50 LOW BACK PAIN, UNSPECIFIED BACK PAIN LATERALITY, UNSPECIFIED CHRONICITY, UNSPECIFIED WHETHER SCIATICA PRESENT: ICD-10-CM

## 2023-06-29 DIAGNOSIS — M54.16 LUMBAR RADICULOPATHY: ICD-10-CM

## 2023-06-29 DIAGNOSIS — R20.0 NUMBNESS IN BOTH LEGS: ICD-10-CM

## 2023-06-29 RX ORDER — BUPROPION HYDROCHLORIDE 300 MG/1
300 TABLET ORAL EVERY MORNING
Qty: 30 TABLET | Refills: 0 | Status: SHIPPED | OUTPATIENT
Start: 2023-06-29 | End: 2023-06-30

## 2023-06-30 ENCOUNTER — HOSPITAL ENCOUNTER (OUTPATIENT)
Dept: MRI IMAGING | Age: 58
End: 2023-06-30
Payer: MEDICAID

## 2023-06-30 DIAGNOSIS — F41.9 ANXIETY: ICD-10-CM

## 2023-06-30 DIAGNOSIS — F31.9 BIPOLAR 1 DISORDER (HCC): ICD-10-CM

## 2023-06-30 PROCEDURE — 6360000004 HC RX CONTRAST MEDICATION: Performed by: PHYSICIAN ASSISTANT

## 2023-06-30 PROCEDURE — 72158 MRI LUMBAR SPINE W/O & W/DYE: CPT

## 2023-06-30 PROCEDURE — A9579 GAD-BASE MR CONTRAST NOS,1ML: HCPCS | Performed by: PHYSICIAN ASSISTANT

## 2023-06-30 RX ORDER — BUPROPION HYDROCHLORIDE 300 MG/1
300 TABLET ORAL EVERY MORNING
Qty: 30 TABLET | Refills: 0 | Status: SHIPPED | OUTPATIENT
Start: 2023-06-30 | End: 2023-07-30

## 2023-06-30 RX ORDER — HYDROCODONE BITARTRATE AND ACETAMINOPHEN 10; 325 MG/1; MG/1
TABLET ORAL
COMMUNITY
Start: 2023-06-05

## 2023-06-30 RX ADMIN — GADOTERIDOL 20 ML: 279.3 INJECTION, SOLUTION INTRAVENOUS at 15:58

## 2023-07-06 DIAGNOSIS — G25.81 RLS (RESTLESS LEGS SYNDROME): ICD-10-CM

## 2023-07-06 RX ORDER — ROPINIROLE 2 MG/1
TABLET, FILM COATED ORAL
Qty: 180 TABLET | Refills: 0 | Status: SHIPPED | OUTPATIENT
Start: 2023-07-06

## 2023-07-06 NOTE — TELEPHONE ENCOUNTER
Health Maintenance   Topic Date Due    Diabetes screen  Never done    Lipids  Never done    Colorectal Cancer Screen  Never done    DTaP/Tdap/Td vaccine (1 - Tdap) 03/23/2024 (Originally 7/10/2019)    Shingles vaccine (2 of 2) 03/23/2024 (Originally 12/29/2020)    COVID-19 Vaccine (3 - Booster for Lianna Halt series) 03/23/2024 (Originally 9/22/2021)    Hepatitis C screen  03/23/2024 (Originally 9/7/1984)    HIV screen  03/23/2024 (Originally 9/7/1981)    Pneumococcal 0-64 years Vaccine (2 - PPSV23 if available, else PCV20) 04/27/2024 (Originally 12/29/2020)    Flu vaccine (1) 08/01/2023    Depression Monitoring  06/19/2024    Breast cancer screen  05/10/2025    Cervical cancer screen  02/24/2027    Hepatitis A vaccine  Aged Out    Hib vaccine  Aged Out    Meningococcal (ACWY) vaccine  Aged Out             (applicable per patient's age: Cancer Screenings, Depression Screening, Fall Risk Screening, Immunizations)    AST (U/L)   Date Value   03/30/2023 14     ALT (U/L)   Date Value   03/30/2023 10     BUN (mg/dL)   Date Value   03/30/2023 12      (goal A1C is < 7)   (goal LDL is <100) need 30-50% reduction from baseline     BP Readings from Last 3 Encounters:   06/19/23 124/82   05/11/23 122/88   04/27/23 124/82    (goal /80)      All Future Testing planned in [facility]:  Lab Frequency Next Occurrence   Baseline Diagnostic Sleep Study Once 05/18/2023   MRI CERVICAL SPINE WO CONTRAST Once 06/26/2023       Next Visit Date:  Future Appointments   Date Time Provider [address]   7/19/2023 10:40 AM AIMEE Schilling - CNP [facility]   8/8/2023  8:15 PM [facility] [place] [facility]            Patient Active Problem List:     Bipolar 1 disorder ([address])     Chest pain in adult     Hypertension

## 2023-07-19 ENCOUNTER — OFFICE VISIT (OUTPATIENT)
Dept: PRIMARY CARE CLINIC | Age: 58
End: 2023-07-19
Payer: MEDICAID

## 2023-07-19 VITALS
SYSTOLIC BLOOD PRESSURE: 102 MMHG | DIASTOLIC BLOOD PRESSURE: 70 MMHG | BODY MASS INDEX: 41.54 KG/M2 | TEMPERATURE: 98.3 F | OXYGEN SATURATION: 98 % | HEART RATE: 71 BPM | WEIGHT: 242 LBS | RESPIRATION RATE: 16 BRPM

## 2023-07-19 DIAGNOSIS — F41.9 ANXIETY: Primary | ICD-10-CM

## 2023-07-19 DIAGNOSIS — F34.1 DYSTHYMIA: ICD-10-CM

## 2023-07-19 PROCEDURE — 99214 OFFICE O/P EST MOD 30 MIN: CPT | Performed by: NURSE PRACTITIONER

## 2023-07-19 PROCEDURE — 3074F SYST BP LT 130 MM HG: CPT | Performed by: NURSE PRACTITIONER

## 2023-07-19 PROCEDURE — 3078F DIAST BP <80 MM HG: CPT | Performed by: NURSE PRACTITIONER

## 2023-07-19 RX ORDER — LORAZEPAM 0.5 MG/1
0.5 TABLET ORAL 2 TIMES DAILY PRN
Qty: 60 TABLET | Refills: 0 | Status: SHIPPED | OUTPATIENT
Start: 2023-07-19 | End: 2023-09-17

## 2023-07-19 ASSESSMENT — PATIENT HEALTH QUESTIONNAIRE - PHQ9
9. THOUGHTS THAT YOU WOULD BE BETTER OFF DEAD, OR OF HURTING YOURSELF: 0
SUM OF ALL RESPONSES TO PHQ QUESTIONS 1-9: 0
3. TROUBLE FALLING OR STAYING ASLEEP: 0
SUM OF ALL RESPONSES TO PHQ QUESTIONS 1-9: 0
2. FEELING DOWN, DEPRESSED OR HOPELESS: 0
4. FEELING TIRED OR HAVING LITTLE ENERGY: 0
6. FEELING BAD ABOUT YOURSELF - OR THAT YOU ARE A FAILURE OR HAVE LET YOURSELF OR YOUR FAMILY DOWN: 0
1. LITTLE INTEREST OR PLEASURE IN DOING THINGS: 0
SUM OF ALL RESPONSES TO PHQ QUESTIONS 1-9: 0
SUM OF ALL RESPONSES TO PHQ9 QUESTIONS 1 & 2: 0
5. POOR APPETITE OR OVEREATING: 0
SUM OF ALL RESPONSES TO PHQ QUESTIONS 1-9: 0
8. MOVING OR SPEAKING SO SLOWLY THAT OTHER PEOPLE COULD HAVE NOTICED. OR THE OPPOSITE, BEING SO FIGETY OR RESTLESS THAT YOU HAVE BEEN MOVING AROUND A LOT MORE THAN USUAL: 0
7. TROUBLE CONCENTRATING ON THINGS, SUCH AS READING THE NEWSPAPER OR WATCHING TELEVISION: 0
10. IF YOU CHECKED OFF ANY PROBLEMS, HOW DIFFICULT HAVE THESE PROBLEMS MADE IT FOR YOU TO DO YOUR WORK, TAKE CARE OF THINGS AT HOME, OR GET ALONG WITH OTHER PEOPLE: 0

## 2023-07-19 ASSESSMENT — ENCOUNTER SYMPTOMS
EYES NEGATIVE: 1
RESPIRATORY NEGATIVE: 1
ALLERGIC/IMMUNOLOGIC NEGATIVE: 1
GASTROINTESTINAL NEGATIVE: 1

## 2023-07-19 NOTE — PATIENT INSTRUCTIONS
SURVEY:     You may be receiving a survey from [facility] regarding your visit today. Please complete the survey to enable us to provide the highest quality of care to you and your family. If you cannot score us a very good on any question, please call the office to discuss how we could have made your experience a very good one.      Thank you,    Adelaide Schneider, APRN-CINDY Crespo, APRN-CNP  Mei Richey, LPN  Gray Hem, CMA  Giovanny Sicks, CMA  Linda, CMA  Leslie, PCA  Sonia, PM

## 2023-07-19 NOTE — PROGRESS NOTES
[facility]  [place]per[place] Ornelas, [address] PRIMARY CARE  [address] [facility] [address] [facility]  Dept: [phone]  Dept Fax: [phone]      Name: Ricci Arita  : 1965         Chief Complaint:     Chief Complaint   Patient presents with    Anxiety     1 month check. Patient doing well on increase of Zoloft and Wellbutrin. Joint Swelling     Patient c/o left ankle swelling. Patient has noticed that it has improved but was red and hot to touch. History of Present Illness:      Tanisha Abbott is a 65 y.o.  female who presents with Anxiety (1 month check. Patient doing well on increase of Zoloft and Wellbutrin. ) and Joint Swelling (Patient c/o left ankle swelling. Patient has noticed that it has improved but was red and hot to touch. )      DANIELLE Redding is here today for a routine office visit. She states she has been doing well with the increased zoloft dose. She has been taking the Ativan BID for anxiety attacks. She states that at home she has a lot of increased stress right now. She states if she has a panic attack she is having to double her dose. She states this did only happen twice in the last month. Left lower leg had redness and swelling that she states was hot to the touch over the weekend.   She has been using Benadryl cream to the leg and has returned to normal.      Past Medical History:     Past Medical History:   Diagnosis Date    Arthritis     Asthma     COPD (chronic obstructive pulmonary disease) ([address])     Eczema of face     Genital warts     Hypertension     Psychiatric problem     Restless legs     Stroke [facility])       Reviewed all health maintenance requirements and ordered appropriate tests  Health Maintenance Due   Topic Date Due    Diabetes screen  Never done    Lipids  Never done    Colorectal Cancer Screen  Never done       Past Surgical History:     Past Surgical History:   Procedure Laterality Date    APPENDECTOMY      BACK SURGERY

## 2023-07-24 ENCOUNTER — TELEPHONE (OUTPATIENT)
Dept: PRIMARY CARE CLINIC | Age: 58
End: 2023-07-24

## 2023-07-24 NOTE — TELEPHONE ENCOUNTER
Call to [facility], did not want to schedule w/B. Might.  \"He wouldn't understand\"

## 2023-07-24 NOTE — TELEPHONE ENCOUNTER
----- Message from Peter Rodriguez sent at 7/24/2023  2:34 PM EDT -----  Subject: Appointment Request    Reason for Call: Established Patient Appointment needed: Routine (Patient   Request) No Script    QUESTIONS    Reason for appointment request? Available appointments did not meet   patient need     Additional Information for Provider? Pt of Viola Lynch called to   schedule appt on 7/26. Stated has lost a member of her family and really   would like to come in Wednesday to see her PCP.  No appts found for 7/26.   ---------------------------------------------------------------------------  --------------  Rubens DORADO  [phone]; OK to leave message on voicemail  ---------------------------------------------------------------------------  --------------  SCRIPT ANSWERS

## 2023-07-26 DIAGNOSIS — F41.9 ANXIETY: ICD-10-CM

## 2023-07-26 DIAGNOSIS — F31.9 BIPOLAR 1 DISORDER (HCC): ICD-10-CM

## 2023-07-30 DIAGNOSIS — F31.9 BIPOLAR 1 DISORDER (HCC): ICD-10-CM

## 2023-07-30 DIAGNOSIS — F41.9 ANXIETY: ICD-10-CM

## 2023-07-31 RX ORDER — BUPROPION HYDROCHLORIDE 300 MG/1
TABLET ORAL
Qty: 90 TABLET | Refills: 0 | Status: SHIPPED | OUTPATIENT
Start: 2023-07-31

## 2023-07-31 NOTE — TELEPHONE ENCOUNTER
Health Maintenance   Topic Date Due    Diabetes screen  Never done    Lipids  Never done    Colorectal Cancer Screen  Never done    DTaP/Tdap/Td vaccine (1 - Tdap) 03/23/2024 (Originally 7/10/2019)    Shingles vaccine (2 of 2) 03/23/2024 (Originally 12/29/2020)    COVID-19 Vaccine (3 - Booster for Ivonellyn Grandchild series) 03/23/2024 (Originally 9/22/2021)    Hepatitis C screen  03/23/2024 (Originally 9/7/1984)    HIV screen  03/23/2024 (Originally 9/7/1981)    Pneumococcal 0-64 years Vaccine (2 - PPSV23 if available, else PCV20) 04/27/2024 (Originally 12/29/2020)    Flu vaccine (1) 08/01/2023    Depression Monitoring  07/19/2024    Breast cancer screen  05/10/2025    Cervical cancer screen  02/24/2027    Hepatitis A vaccine  Aged Out    Hib vaccine  Aged Out    Meningococcal (ACWY) vaccine  Aged Out             (applicable per patient's age: Cancer Screenings, Depression Screening, Fall Risk Screening, Immunizations)    AST (U/L)   Date Value   03/30/2023 14     ALT (U/L)   Date Value   03/30/2023 10     BUN (mg/dL)   Date Value   03/30/2023 12      (goal A1C is < 7)   (goal LDL is <100) need 30-50% reduction from baseline     BP Readings from Last 3 Encounters:   07/19/23 102/70   06/19/23 124/82   05/11/23 122/88    (goal /80)      All Future Testing planned in [facility]:  Lab Frequency Next Occurrence   Baseline Diagnostic Sleep Study Once 05/18/2023   MRI CERVICAL SPINE WO CONTRAST Once 06/26/2023       Next Visit Date:  Future Appointments   Date Time Provider [address]   8/8/2023  8:00 PM [facility] 1 [facility] [place] [facility]   8/23/2023 10:40 AM Kerry Bolaños APRN - CNP [facility]            Patient Active Problem List:     Bipolar 1 disorder ([address])     Chest pain in adult     Hypertension

## 2023-08-08 ENCOUNTER — HOSPITAL ENCOUNTER (OUTPATIENT)
Dept: SLEEP CENTER | Age: 58
Discharge: HOME | End: 2023-08-08
Payer: COMMERCIAL

## 2023-08-08 VITALS — WEIGHT: 242 LBS | HEIGHT: 64 IN | BODY MASS INDEX: 41.32 KG/M2

## 2023-08-08 DIAGNOSIS — R06.83 LOUD SNORING: ICD-10-CM

## 2023-08-08 PROCEDURE — 95810 POLYSOM 6/> YRS 4/> PARAM: CPT

## 2023-08-08 ASSESSMENT — SLEEP AND FATIGUE QUESTIONNAIRES
ARE YOU TIRED DURING WAKE TIME: ALMOST DAILY
USUAL AMOUNT OF TIME TO FALL ASLEEP (MIN): 30
HOW MANY NAPS DO YOU TAKE PER WEEK: 3
NORMAL AMOUNT OF SLEEP PER NIGHT: 4.5
HOW LIKELY ARE YOU TO NOD OFF OR FALL ASLEEP WHILE SITTING AND TALKING TO SOMEONE: 1
HOW LIKELY ARE YOU TO NOD OFF OR FALL ASLEEP WHEN YOU ARE A PASSENGER IN A CAR FOR AN HOUR WITHOUT A BREAK: 0
WHAT TIME DO YOU USUALLY WAKE UP: 34200
WHAT TIME DO YOU USUALLY GO TO BED: 84600
DOES YOUR SNORING BOTHER OTHERS: YES
NUMBER OF TIMES YOU WAKE PER NIGHT: 3
HOW LIKELY ARE YOU TO NOD OFF OR FALL ASLEEP WHILE WATCHING TV: 3
HAS ANYONE NOTICED THAT YOU QUIT BREATHING DURING SLEEP: NEVER/ALMOST NEVER
ESS TOTAL SCORE: 10
DO YOU HAVE HIGH BLOOD PRESSURE: YES
HOW LIKELY ARE YOU TO NOD OFF OR FALL ASLEEP WHILE SITTING QUIETLY AFTER LUNCH WITHOUT ALCOHOL: 0
HOW LIKELY ARE YOU TO NOD OFF OR FALL ASLEEP WHILE LYING DOWN TO REST IN THE AFTERNOON WHEN CIRCUMSTANCES PERMIT: 3
HAVE YOU EVER NODDED OFF OR FALLEN ASLEEP WHILE DRIVING: NO
HOW LIKELY ARE YOU TO NOD OFF OR FALL ASLEEP WHILE SITTING INACTIVE IN A PUBLIC PLACE: 0
ARE YOU TIRED AFTER SLEEPING: 3-4 TIMES A WEEK
DO YOU SNORE: YES
HOW OFTEN DO YOU SNORE: ALMOST DAILY
HOW LIKELY ARE YOU TO NOD OFF OR FALL ASLEEP IN A CAR, WHILE STOPPED FOR A FEW MINUTES IN TRAFFIC: 0
SNORING VOLUME: VERY LOUD
HOW LIKELY ARE YOU TO NOD OFF OR FALL ASLEEP WHILE SITTING AND READING: 3

## 2023-08-09 ENCOUNTER — TELEPHONE (OUTPATIENT)
Dept: PRIMARY CARE CLINIC | Age: 58
End: 2023-08-09

## 2023-08-09 DIAGNOSIS — G47.33 OSA (OBSTRUCTIVE SLEEP APNEA): Primary | ICD-10-CM

## 2023-08-09 LAB — STATUS: NORMAL

## 2023-08-09 NOTE — TELEPHONE ENCOUNTER
----- Message from AIMEE Winters CNP sent at 8/9/2023  1:08 PM EDT -----  Please notify patient of abnormal sleep study results. I am placing an order for her to have further evaluation for cpap.   Thanks Beth Bridges

## 2023-08-23 ENCOUNTER — OFFICE VISIT (OUTPATIENT)
Dept: PRIMARY CARE CLINIC | Age: 58
End: 2023-08-23
Payer: COMMERCIAL

## 2023-08-23 VITALS
RESPIRATION RATE: 18 BRPM | OXYGEN SATURATION: 99 % | WEIGHT: 241.2 LBS | SYSTOLIC BLOOD PRESSURE: 110 MMHG | HEART RATE: 73 BPM | DIASTOLIC BLOOD PRESSURE: 64 MMHG | TEMPERATURE: 98.2 F | BODY MASS INDEX: 41.4 KG/M2

## 2023-08-23 DIAGNOSIS — F41.9 ANXIETY: Primary | ICD-10-CM

## 2023-08-23 DIAGNOSIS — F31.9 BIPOLAR 1 DISORDER (HCC): ICD-10-CM

## 2023-08-23 DIAGNOSIS — F34.1 DYSTHYMIA: ICD-10-CM

## 2023-08-23 PROCEDURE — 1036F TOBACCO NON-USER: CPT | Performed by: NURSE PRACTITIONER

## 2023-08-23 PROCEDURE — 3017F COLORECTAL CA SCREEN DOC REV: CPT | Performed by: NURSE PRACTITIONER

## 2023-08-23 PROCEDURE — G8417 CALC BMI ABV UP PARAM F/U: HCPCS | Performed by: NURSE PRACTITIONER

## 2023-08-23 PROCEDURE — 3078F DIAST BP <80 MM HG: CPT | Performed by: NURSE PRACTITIONER

## 2023-08-23 PROCEDURE — G8427 DOCREV CUR MEDS BY ELIG CLIN: HCPCS | Performed by: NURSE PRACTITIONER

## 2023-08-23 PROCEDURE — 3074F SYST BP LT 130 MM HG: CPT | Performed by: NURSE PRACTITIONER

## 2023-08-23 PROCEDURE — 99214 OFFICE O/P EST MOD 30 MIN: CPT | Performed by: NURSE PRACTITIONER

## 2023-08-23 RX ORDER — LORAZEPAM 1 MG/1
1 TABLET ORAL 3 TIMES DAILY PRN
Qty: 90 TABLET | Refills: 0 | Status: SHIPPED | OUTPATIENT
Start: 2023-08-23 | End: 2023-09-22

## 2023-08-23 ASSESSMENT — PATIENT HEALTH QUESTIONNAIRE - PHQ9
SUM OF ALL RESPONSES TO PHQ QUESTIONS 1-9: 16
9. THOUGHTS THAT YOU WOULD BE BETTER OFF DEAD, OR OF HURTING YOURSELF: 0
SUM OF ALL RESPONSES TO PHQ QUESTIONS 1-9: 16
SUM OF ALL RESPONSES TO PHQ QUESTIONS 1-9: 16
4. FEELING TIRED OR HAVING LITTLE ENERGY: 1
10. IF YOU CHECKED OFF ANY PROBLEMS, HOW DIFFICULT HAVE THESE PROBLEMS MADE IT FOR YOU TO DO YOUR WORK, TAKE CARE OF THINGS AT HOME, OR GET ALONG WITH OTHER PEOPLE: 2
SUM OF ALL RESPONSES TO PHQ9 QUESTIONS 1 & 2: 6
6. FEELING BAD ABOUT YOURSELF - OR THAT YOU ARE A FAILURE OR HAVE LET YOURSELF OR YOUR FAMILY DOWN: 3
8. MOVING OR SPEAKING SO SLOWLY THAT OTHER PEOPLE COULD HAVE NOTICED. OR THE OPPOSITE, BEING SO FIGETY OR RESTLESS THAT YOU HAVE BEEN MOVING AROUND A LOT MORE THAN USUAL: 0
5. POOR APPETITE OR OVEREATING: 0
2. FEELING DOWN, DEPRESSED OR HOPELESS: 3
7. TROUBLE CONCENTRATING ON THINGS, SUCH AS READING THE NEWSPAPER OR WATCHING TELEVISION: 3
3. TROUBLE FALLING OR STAYING ASLEEP: 3
SUM OF ALL RESPONSES TO PHQ QUESTIONS 1-9: 16
1. LITTLE INTEREST OR PLEASURE IN DOING THINGS: 3

## 2023-08-23 ASSESSMENT — ENCOUNTER SYMPTOMS
RESPIRATORY NEGATIVE: 1
ALLERGIC/IMMUNOLOGIC NEGATIVE: 1
EYES NEGATIVE: 1
GASTROINTESTINAL NEGATIVE: 1

## 2023-08-23 ASSESSMENT — COLUMBIA-SUICIDE SEVERITY RATING SCALE - C-SSRS
1. WITHIN THE PAST MONTH, HAVE YOU WISHED YOU WERE DEAD OR WISHED YOU COULD GO TO SLEEP AND NOT WAKE UP?: NO
6. HAVE YOU EVER DONE ANYTHING, STARTED TO DO ANYTHING, OR PREPARED TO DO ANYTHING TO END YOUR LIFE?: NO
2. HAVE YOU ACTUALLY HAD ANY THOUGHTS OF KILLING YOURSELF?: NO
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
## 2023-08-23 NOTE — PATIENT INSTRUCTIONS
SURVEY:     You may be receiving a survey from [employer] regarding your visit today. Please complete the survey to enable us to provide the highest quality of care to you and your family. If you cannot score us a very good on any question, please call the office to discuss how we could have made your experience a very good one.      Thank you,    Ruby Schneider, APRN-CINDY Curiel, APRN-CNP  Darwin Garcia, LAURA Kessler, MELVA Dela Cruz, MELVA Mckeon, CMA  Leslie, DEANDRE Scott, PM

## 2023-08-28 ENCOUNTER — HOSPITAL ENCOUNTER (EMERGENCY)
Age: 58
Discharge: HOME | End: 2023-08-29
Attending: EMERGENCY MEDICINE
Payer: COMMERCIAL

## 2023-08-28 VITALS
WEIGHT: 240 LBS | OXYGEN SATURATION: 99 % | RESPIRATION RATE: 20 BRPM | HEIGHT: 64 IN | DIASTOLIC BLOOD PRESSURE: 83 MMHG | SYSTOLIC BLOOD PRESSURE: 120 MMHG | HEART RATE: 115 BPM | BODY MASS INDEX: 40.97 KG/M2 | TEMPERATURE: 99.5 F

## 2023-08-28 DIAGNOSIS — M54.32 SCIATICA OF LEFT SIDE: Primary | ICD-10-CM

## 2023-08-28 LAB
BACTERIA URNS QL MICRO: ABNORMAL
BILIRUB UR QL STRIP: NEGATIVE
CASTS #/AREA URNS LPF: ABNORMAL /LPF
CASTS #/AREA URNS LPF: ABNORMAL /LPF
CLARITY UR: CLEAR
COLOR UR: YELLOW
EPI CELLS #/AREA URNS HPF: ABNORMAL /HPF (ref 0–25)
GLUCOSE UR STRIP-MCNC: NEGATIVE MG/DL
HGB UR QL STRIP.AUTO: NEGATIVE
KETONES UR STRIP-MCNC: NEGATIVE MG/DL
LEUKOCYTE ESTERASE UR QL STRIP: NEGATIVE
NITRITE UR QL STRIP: NEGATIVE
PH UR STRIP: 6 [PH] (ref 5–9)
PROT UR STRIP-MCNC: NEGATIVE MG/DL
RBC #/AREA URNS HPF: ABNORMAL /HPF (ref 0–2)
SP GR UR STRIP: 1.02 (ref 1.01–1.02)
UROBILINOGEN UR STRIP-ACNC: NORMAL EU/DL (ref 0–1)
WBC #/AREA URNS HPF: ABNORMAL /HPF (ref 0–5)

## 2023-08-28 PROCEDURE — 99284 EMERGENCY DEPT VISIT MOD MDM: CPT

## 2023-08-28 PROCEDURE — 87086 URINE CULTURE/COLONY COUNT: CPT

## 2023-08-28 PROCEDURE — 81001 URINALYSIS AUTO W/SCOPE: CPT

## 2023-08-28 PROCEDURE — 6360000002 HC RX W HCPCS: Performed by: EMERGENCY MEDICINE

## 2023-08-28 PROCEDURE — 6370000000 HC RX 637 (ALT 250 FOR IP): Performed by: EMERGENCY MEDICINE

## 2023-08-28 PROCEDURE — 96372 THER/PROPH/DIAG INJ SC/IM: CPT

## 2023-08-28 RX ORDER — ORPHENADRINE CITRATE 30 MG/ML
60 INJECTION INTRAMUSCULAR; INTRAVENOUS ONCE
Status: COMPLETED | OUTPATIENT
Start: 2023-08-28 | End: 2023-08-28

## 2023-08-28 RX ORDER — LIDOCAINE 4 G/G
1 PATCH TOPICAL DAILY
Status: DISCONTINUED | OUTPATIENT
Start: 2023-08-29 | End: 2023-08-28

## 2023-08-28 RX ORDER — KETOROLAC TROMETHAMINE 30 MG/ML
30 INJECTION, SOLUTION INTRAMUSCULAR; INTRAVENOUS ONCE
Status: COMPLETED | OUTPATIENT
Start: 2023-08-28 | End: 2023-08-28

## 2023-08-28 RX ORDER — LIDOCAINE 4 G/G
1 PATCH TOPICAL DAILY
Status: DISCONTINUED | OUTPATIENT
Start: 2023-08-28 | End: 2023-08-29 | Stop reason: HOSPADM

## 2023-08-28 RX ADMIN — KETOROLAC TROMETHAMINE 30 MG: 30 INJECTION, SOLUTION INTRAMUSCULAR at 23:47

## 2023-08-28 RX ADMIN — ORPHENADRINE CITRATE 60 MG: 60 INJECTION INTRAMUSCULAR; INTRAVENOUS at 23:52

## 2023-08-28 ASSESSMENT — PAIN SCALES - GENERAL
PAINLEVEL_OUTOF10: 10

## 2023-08-28 ASSESSMENT — PAIN DESCRIPTION - DESCRIPTORS: DESCRIPTORS: STABBING

## 2023-08-28 ASSESSMENT — PAIN - FUNCTIONAL ASSESSMENT: PAIN_FUNCTIONAL_ASSESSMENT: 0-10

## 2023-08-28 ASSESSMENT — PAIN DESCRIPTION - LOCATION: LOCATION: BACK;HIP

## 2023-08-28 ASSESSMENT — PAIN DESCRIPTION - ORIENTATION: ORIENTATION: LEFT

## 2023-08-29 RX ORDER — TIZANIDINE 4 MG/1
4 TABLET ORAL EVERY 8 HOURS PRN
Qty: 8 TABLET | Refills: 0 | Status: SHIPPED | OUTPATIENT
Start: 2023-08-29

## 2023-08-29 RX ORDER — LIDOCAINE 50 MG/G
1 PATCH TOPICAL DAILY
Qty: 10 PATCH | Refills: 0 | Status: SHIPPED | OUTPATIENT
Start: 2023-08-29

## 2023-08-29 RX ORDER — PREDNISONE 50 MG/1
50 TABLET ORAL DAILY
Qty: 5 TABLET | Refills: 0 | Status: SHIPPED | OUTPATIENT
Start: 2023-08-29 | End: 2023-09-03

## 2023-08-29 NOTE — ED PROVIDER NOTES
[facility] ED  Emergency Department Encounter  Emergency Medicine Resident     Pt Name:Tanisha Davis  MRN: [id number]  [address] [facility] [place] 1965  Date of evaluation: 23  PCP:  AIMEE Estrada CNP  10:48 PM EDT      CHIEF COMPLAINT       Chief Complaint   Patient presents with    Hip Pain     Pt with complaint of left hip pain starting yesterday evening, denies injury. Pt reports she is on pallative care and takes pain medication every 4 hours and they aren't touching this pain. Pain radiates down leg       HISTORY OF PRESENT ILLNESS  (Location/Symptom, Timing/Onset, Context/Setting, Quality, Duration, Modifying Factors, Severity.)      Tanisha Davis is a 65 y.o. female who presents with chronic pain, does follow with pain management, and takes Vicodin daily. Pain typically worse at night. And over the past few days she is having significant worsening left lower back pain, radiating into her left leg. And came in for further evaluation. She does take Vicodin 50 mg at night. She does report a recent MRI of her L-spine that reports worsening findings, and she has been referred to a doctor up in [place] but has not had a call back to schedule an appointment. And she is unsure exactly who she is post to be seen up there. She also reports foul smelling urine, no increased frequency, and no dysuria    PAST MEDICAL / SURGICAL / SOCIAL / FAMILY HISTORY      has a past medical history of Arthritis, Asthma, COPD (chronic obstructive pulmonary disease) ([address]), Eczema of face, Genital warts, Hypertension, Psychiatric problem, Restless legs, Sleep apnea, and Stroke ([address]). has a past surgical history that includes back surgery; lumbar fusion; Total knee arthroplasty (Left);  section; Kidney surgery (Right); Appendectomy; Tonsillectomy; and Cholecystectomy.       Social History     Socioeconomic History    Marital status: Unknown     Spouse name: Not on file    Number of children: Not on

## 2023-08-29 NOTE — DISCHARGE INSTRUCTIONS
Please follow-up with provider you have been referred to in [place]. For further evaluation of your ongoing back pain, continue to take your pain medication at home, and will do a short course of a steroid, as well as a lidocaine patch to help with your symptoms, as well as a muscle relaxer. Return to ER for worsening pain numbness, tingling or weakness.

## 2023-08-30 ENCOUNTER — TELEPHONE (OUTPATIENT)
Dept: PRIMARY CARE CLINIC | Age: 58
End: 2023-08-30

## 2023-08-30 LAB
MICROORGANISM SPEC CULT: NORMAL
SPECIMEN DESCRIPTION: NORMAL

## 2023-08-30 NOTE — TELEPHONE ENCOUNTER
[address] [facility],[address] Floor Transitions Initial Follow Up Call    Outreach made within 2 business days of discharge: Yes    Patient: Herber Santizo Patient : 1965   MRN: [id number]  Reason for Admission: There are no discharge diagnoses documented for the most recent discharge. Discharge Date: 23       Spoke with: sana for patient     Discharge department/facility: [facility]     TCM Interactive Patient Contact:  Was patient able to fill all prescriptions:   Was patient instructed to bring all medications to the follow-up visit:   Is patient taking all medications as directed in the discharge summary?    Does patient understand their discharge instructions:   Does patient have questions or concerns that need addressed prior to 7-14 day follow up office visit:     Scheduled appointment with PCP within 7-14 days    Follow Up  Future Appointments   Date Time Provider [address] [place] [address]   2023 10:00 AM AIMEE Yousif - CNP [facility]   2023  8:15 PM [address] [facility] [address] [facility]       Roosevelt Wang MA\

## 2023-08-30 NOTE — TELEPHONE ENCOUNTER
----- Message from Kyra Oliver sent at 8/30/2023 11:45 AM EDT -----  Subject: Appointment Request    Reason for Call: Established Patient Appointment needed: Routine ED Follow   Up Visit    QUESTIONS    Reason for appointment request? Available appointments did not meet   patient need     Additional Information for Provider? pt was seen in the er for hip pain   and was trying to schedule a follow up - says she is still in lots of   pain.  tried to get her in with pcp, nothing available and tried to see if   she would see katelin pang she doesn't want to. please call pt to advise or   schedule.  ---------------------------------------------------------------------------  --------------  Klarissa Johnson [facility]  [phone]; OK to leave message on voicemail  ---------------------------------------------------------------------------  --------------  SCRIPT ANSWERS

## 2023-08-31 ENCOUNTER — OFFICE VISIT (OUTPATIENT)
Dept: PRIMARY CARE CLINIC | Age: 58
End: 2023-08-31
Payer: COMMERCIAL

## 2023-08-31 VITALS
HEART RATE: 79 BPM | WEIGHT: 243.2 LBS | TEMPERATURE: 97.4 F | OXYGEN SATURATION: 97 % | SYSTOLIC BLOOD PRESSURE: 116 MMHG | BODY MASS INDEX: 41.75 KG/M2 | DIASTOLIC BLOOD PRESSURE: 74 MMHG

## 2023-08-31 DIAGNOSIS — M51.36 L4-L5 DISC BULGE: ICD-10-CM

## 2023-08-31 DIAGNOSIS — M25.552 LEFT HIP PAIN: ICD-10-CM

## 2023-08-31 DIAGNOSIS — M54.50 PAIN IN LEFT LUMBAR REGION OF BACK: Primary | ICD-10-CM

## 2023-08-31 PROCEDURE — 99214 OFFICE O/P EST MOD 30 MIN: CPT | Performed by: NURSE PRACTITIONER

## 2023-08-31 PROCEDURE — G8417 CALC BMI ABV UP PARAM F/U: HCPCS | Performed by: NURSE PRACTITIONER

## 2023-08-31 PROCEDURE — G8427 DOCREV CUR MEDS BY ELIG CLIN: HCPCS | Performed by: NURSE PRACTITIONER

## 2023-08-31 PROCEDURE — 3078F DIAST BP <80 MM HG: CPT | Performed by: NURSE PRACTITIONER

## 2023-08-31 PROCEDURE — 3017F COLORECTAL CA SCREEN DOC REV: CPT | Performed by: NURSE PRACTITIONER

## 2023-08-31 PROCEDURE — 1036F TOBACCO NON-USER: CPT | Performed by: NURSE PRACTITIONER

## 2023-08-31 PROCEDURE — 3074F SYST BP LT 130 MM HG: CPT | Performed by: NURSE PRACTITIONER

## 2023-08-31 ASSESSMENT — PATIENT HEALTH QUESTIONNAIRE - PHQ9
1. LITTLE INTEREST OR PLEASURE IN DOING THINGS: 0
SUM OF ALL RESPONSES TO PHQ QUESTIONS 1-9: 0
SUM OF ALL RESPONSES TO PHQ QUESTIONS 1-9: 0
2. FEELING DOWN, DEPRESSED OR HOPELESS: 0
10. IF YOU CHECKED OFF ANY PROBLEMS, HOW DIFFICULT HAVE THESE PROBLEMS MADE IT FOR YOU TO DO YOUR WORK, TAKE CARE OF THINGS AT HOME, OR GET ALONG WITH OTHER PEOPLE: 0
3. TROUBLE FALLING OR STAYING ASLEEP: 0
SUM OF ALL RESPONSES TO PHQ9 QUESTIONS 1 & 2: 0
SUM OF ALL RESPONSES TO PHQ QUESTIONS 1-9: 0
4. FEELING TIRED OR HAVING LITTLE ENERGY: 0
9. THOUGHTS THAT YOU WOULD BE BETTER OFF DEAD, OR OF HURTING YOURSELF: 0
7. TROUBLE CONCENTRATING ON THINGS, SUCH AS READING THE NEWSPAPER OR WATCHING TELEVISION: 0
SUM OF ALL RESPONSES TO PHQ QUESTIONS 1-9: 0
5. POOR APPETITE OR OVEREATING: 0
6. FEELING BAD ABOUT YOURSELF - OR THAT YOU ARE A FAILURE OR HAVE LET YOURSELF OR YOUR FAMILY DOWN: 0
8. MOVING OR SPEAKING SO SLOWLY THAT OTHER PEOPLE COULD HAVE NOTICED. OR THE OPPOSITE, BEING SO FIGETY OR RESTLESS THAT YOU HAVE BEEN MOVING AROUND A LOT MORE THAN USUAL: 0

## 2023-08-31 ASSESSMENT — ENCOUNTER SYMPTOMS
ALLERGIC/IMMUNOLOGIC NEGATIVE: 1
RESPIRATORY NEGATIVE: 1
BACK PAIN: 1
EYES NEGATIVE: 1

## 2023-08-31 NOTE — PROGRESS NOTES
CREATININE 0.72 03/30/2023 10:15 PM    GLUCOSE 132 03/30/2023 10:15 PM    PROT 7.4 03/30/2023 10:15 PM    LABALBU 4.0 03/30/2023 10:15 PM    LABALBU 4.4 11/18/2011 10:06 PM    BILITOT 0.3 03/30/2023 10:15 PM    ALKPHOS 85 03/30/2023 10:15 PM    AST 14 03/30/2023 10:15 PM    ALT 10 03/30/2023 10:15 PM     Lab Results   Component Value Date/Time    WBC 9.6 03/31/2023 05:50 AM    RBC 4.62 03/31/2023 05:50 AM    RBC 4.70 11/18/2011 10:06 PM    HGB 12.6 03/31/2023 05:50 AM    HCT 40.1 03/31/2023 05:50 AM    MCV 86.8 03/31/2023 05:50 AM    MCH 27.3 03/31/2023 05:50 AM    MCHC 31.4 03/31/2023 05:50 AM    RDW 14.3 03/31/2023 05:50 AM     03/31/2023 05:50 AM    MPV 10.1 03/31/2023 05:50 AM     No results found for: TSH  No results found for: CHOL, LDL, HDL, PSA, LABA1C    Assessment/Plan:      Diagnosis Orders   1. Pain in left lumbar region of back  [place], [place], Neurosurgery, [employer]      2. Left hip pain  XR HIP LEFT (2-3 VIEWS)    Ambulatory referral to Physical Therapy      3. L4-L5 disc bulge  Luana Quiros, CIND[place], Neurosurgery, [employer]    Ambulatory referral to Physical Therapy        Xray of left hip and will call with results and further recommendations. Continue currently prescribed medications. Referral to PT for further evaluation and treatment. Referral to neurosurgery for further evaluation and treatment. 1.  Tanisha received counseling on the following healthy behaviors: nutrition, exercise, and medication adherence  2. Patient given educational materials - see patient instructions  3. Was a self-tracking handout given in paper form or via [employer]? No  If yes, see orders or list here. 4.  Discussed use, benefit, and side effects of prescribed medications. Barriers to medication compliance addressed. All patient questions answered. Pt voiced understanding. 5.  Reviewed prior labs and health maintenance  6. Continue current medications, diet and exercise.     Completed

## 2023-08-31 NOTE — PATIENT INSTRUCTIONS
SURVEY:     You may be receiving a survey from [employer] regarding your visit today. Please complete the survey to enable us to provide the highest quality of care to you and your family. If you cannot score us a very good on any question, please call the office to discuss how we could have made your experience a very good one.      Thank you,    Yahir Schneider, APRN-CNP  Elsie Collins, APRN-CNP  Micaela Aguirre, LAURA Braswell, MELVA  Theone Cease, CMA  Linda, CMA  Leslie, PCA  Sonia, PM

## 2023-09-02 ENCOUNTER — HOSPITAL ENCOUNTER (OUTPATIENT)
Dept: GENERAL RADIOLOGY | Age: 58
End: 2023-09-02
Payer: COMMERCIAL

## 2023-09-02 ENCOUNTER — HOSPITAL ENCOUNTER (OUTPATIENT)
Age: 58
End: 2023-09-02
Payer: COMMERCIAL

## 2023-09-02 DIAGNOSIS — M25.552 LEFT HIP PAIN: ICD-10-CM

## 2023-09-02 PROCEDURE — 73502 X-RAY EXAM HIP UNI 2-3 VIEWS: CPT

## 2023-09-05 ENCOUNTER — TELEPHONE (OUTPATIENT)
Dept: PRIMARY CARE CLINIC | Age: 58
End: 2023-09-05

## 2023-09-05 NOTE — TELEPHONE ENCOUNTER
Patient notified verbalizes understanding. She starts PT on 9/7/23. Referral for Neurosurgeon was placed on 8/31/23. Patient states they have not called her yet. Number to her office was given to them.

## 2023-09-05 NOTE — TELEPHONE ENCOUNTER
----- Message from AIMEE Jolley CNP sent at 9/5/2023  8:15 AM EDT -----  Please notify patient of normal hip xray results.   Thanks Marcy

## 2023-09-07 ENCOUNTER — HOSPITAL ENCOUNTER (OUTPATIENT)
Dept: PHYSICAL THERAPY | Age: 58
Setting detail: THERAPIES SERIES
Discharge: HOME | End: 2023-09-07
Payer: COMMERCIAL

## 2023-09-07 PROCEDURE — 97161 PT EVAL LOW COMPLEX 20 MIN: CPT

## 2023-09-07 PROCEDURE — 97110 THERAPEUTIC EXERCISES: CPT

## 2023-09-07 ASSESSMENT — PAIN SCALES - QUEBEC BACK PAIN DISABILITY SCALE
GET OUT OF BED: 4
WALK A FEW BLOCKS OR 300 TO 400M: 4
THROW A BALL: 5
CARRY TWO BAGS OF GROCERIES: 2
RIDE IN A CAR: 2
REACH UP TO HIGH SHELVES: 3
SIT IN A CHAIR FOR SEVERAL HOURS: 2
MOVE A CHAIR: 3
CLIMB ONE FLIGHT OF STAIRS: 4
PUT ON SOCKS OR PANYHOSE: 3
TURN OVER IN BED: 4
RUN ONE BLOCK OR 100M: 5
STAND UP FOR 20 TO 30 MINUTES: 5
BEND OVER TO CLEAN THE BATHTUB: 5
MAKE YOUR BED: 4
TOTAL SCORE: 76
WALK SEVERAL KILOMETERS  OR MILES: 5
SLEEP THROUGH THE NIGHT: 5
LIFT AND CARRY A HEAVY SUITCASE: 5
TAKE FOOD OUT OF THE REFRIGERATOR: 2
PULL OR PUSH HEAVY DOORS: 4

## 2023-09-07 NOTE — PROGRESS NOTES
Phone: [address]          Fax: [phone]                      Outpatient Physical Therapy                                                                      Evaluation  Date: 2023  Patient: Jennie Flaherty  : 1965  [facility] #: [id number]    Referring Physician: AIMEE Alicea*    Medical Diagnosis: L hip pain, M25.552; L4-5 disc bulge, M51.36    Treatment Diagnosis: Low back pain  PT Insurance Information: [place]  Total # of Visits Approved: 10   Total # of Visits to Date: 1  No Show: 0  Canceled Appointment: 0     Subjective  Subjective: Patient reports initial back injury in  from heavy lifting at work. She has L5-S1 fusion back around  but now she has a disc bulge L4-5 with mild central canal stenosis from MRI in 2023 and plans to follow up with ortho doctor regarding low back and hip pain. Patient reports she is maxed out on pain medications. Going to the pool this summer did help with the pain. She is on disability. Xray of L hip was unremarkable. Additional Pertinent Hx: HTN, panic attacks, anxiety, depression, hx of TIA, asthma, COPD, hx of lumbar fusion (pt reports a screw is broken but surgeon won't fix it d/t obesity), hx of L TKA    Observations:   General Observations  Description: Pt sits with lumbar hyperlordosis and anterior pelvic tilt; Severe TTP L5/S1, B PSIS. (-) B slump, SLR, (-) B VIDAL, good pelvic alignment.     Objective    Strength       Strength LLE  L Hip Flexion: 4-/5  L Hip ABduction: 4-/5  L Hip ADduction: 4-/5  L Knee Flexion: 4-/5  L Knee Extension: 4-/5  L Ankle Dorsiflexion: 4/5       Lumbar Assessment     AROM Lumbar Spine   Lumbar spine general AROM: flexion: to knees with pain, B SB: 2in from knees with pain with R SB     Special Tests:     Strength RLE  R Hip Flexion: 4-/5  R Hip ABduction: 4-/5  R Hip ADduction: 4-/5  R Knee Flexion: 4-/5  R Knee Extension: 4-/5  R Ankle Dorsiflexion: 4/5  Strength

## 2023-09-07 NOTE — PLAN OF CARE
[facility]           Phone: [phone]             Outpatient Physical Therapy  Fax: [phone]                                           Date: 2023  Patient: Surinder Landrum : 1965 CSN #: [id number]   Referring Physician: AIMEE Florentino*      [x] Plan of Care   [] Updated Plan of Care    Dates of Service to Include: 2023 to 10/06/23    Diagnosis:  L hip pain, M25.552; L4-5 disc bulge, M51.36    Rehab (Treatment) Diagnosis:  Low back pain             Onset Date:       Attendance  Total # of Visits to Date: 1 No Show: 0 Canceled Appointment: 0    Assessment  Assessment: Patient is 63year old female with dx of L4-5 disc bulge and L hip pain who presents with increased pain 7/10 on average that is worse with any kind of activity. Pt sits with lumbar hyperlordosis and anterior pelvic tilt; Severe TTP L5/S1, B PSIS. (-) B slump, SLR, (-) B VIDAL, good pelvic alignment. Decreased lumbar AROM flexion: to knees with increase pain and B SB: 2in above knees with pain with R SB'ing. Decreased core strength: 3+/5 grossly and decreased B hip strength: 4-/5 grossly all planes. Patient to benefit from aquatic physical therapy to offload the spine and decrease pain/radicular symptoms and improve ROM and core strength to return to PLOF. Goals  Short Term Goals  Time Frame for Short Term Goals: 3 weeks  Short Term Goal 1: Patient to initiate HEP for improved lumbar ROM and core strength. Short Term Goal 2: Pt to be instructed in gentle core strengthening to improve lumbar stability. Short Term Goal 3: Patient to tolerate 45 min of aquatic exercise to offload the spine and decrease low back pain. Long Term Goals  Time Frame for Long Term Goals : 6 weeks  Long Term Goal 1: Patient to be independent and compliant with HEP and aquatic exercise.   Long Term Goal 2: Pt to have improved lumbar AROM flexion: 2in

## 2023-09-11 ENCOUNTER — HOSPITAL ENCOUNTER (OUTPATIENT)
Dept: PHYSICAL THERAPY | Age: 58
Setting detail: THERAPIES SERIES
Discharge: HOME | End: 2023-09-11
Payer: COMMERCIAL

## 2023-09-11 NOTE — PROGRESS NOTES
Merged with [facility]  Inpatient/Observation/Outpatient Rehabilitation    Date: 2023  Patient Name: Tamela Troncoso       [] Inpatient Acute/Observation       [x]  Outpatient  : 1965     [x] Pt no showed for scheduled appointment      Therapist/Assistant will attempt to see this patient, at our earliest opportunity.        Reena Rosales [address] Date: 2023

## 2023-09-13 DIAGNOSIS — F41.9 ANXIETY: ICD-10-CM

## 2023-09-14 ENCOUNTER — HOSPITAL ENCOUNTER (OUTPATIENT)
Dept: PHYSICAL THERAPY | Age: 58
Setting detail: THERAPIES SERIES
Discharge: HOME | End: 2023-09-14
Payer: COMMERCIAL

## 2023-09-14 DIAGNOSIS — F41.9 ANXIETY: ICD-10-CM

## 2023-09-14 DIAGNOSIS — F31.9 BIPOLAR 1 DISORDER (HCC): ICD-10-CM

## 2023-09-14 PROCEDURE — 97113 AQUATIC THERAPY/EXERCISES: CPT

## 2023-09-14 RX ORDER — BUPROPION HYDROCHLORIDE 300 MG/1
300 TABLET ORAL EVERY MORNING
Qty: 90 TABLET | Refills: 0 | Status: SHIPPED | OUTPATIENT
Start: 2023-09-14

## 2023-09-14 RX ORDER — LORAZEPAM 0.5 MG/1
0.5 TABLET ORAL 2 TIMES DAILY PRN
Qty: 60 TABLET | Refills: 0 | OUTPATIENT
Start: 2023-09-14 | End: 2023-11-13

## 2023-09-14 NOTE — TELEPHONE ENCOUNTER
Patient was taking the pended medications but thought she didn't need them so she threw them away. Now patient states she is spiraling out of control and knows she needs the medication. Patient needs new prescriptions of pended medications.

## 2023-09-14 NOTE — TELEPHONE ENCOUNTER
Health Maintenance   Topic Date Due    Hepatitis B vaccine (1 of 3 - 3-dose series) Never done    Diabetes screen  Never done    Lipids  Never done    Colorectal Cancer Screen  Never done    Flu vaccine (1) 08/01/2023    DTaP/Tdap/Td vaccine (1 - Tdap) 03/23/2024 (Originally 7/10/2019)    Shingles vaccine (2 of 2) 03/23/2024 (Originally 12/29/2020)    COVID-19 Vaccine (3 - Wendell Hakan series) 03/23/2024 (Originally 9/22/2021)    Hepatitis C screen  03/23/2024 (Originally 9/7/1984)    HIV screen  03/23/2024 (Originally 9/7/1981)    Pneumococcal 0-64 years Vaccine (2 - PPSV23 or PCV20) 04/27/2024 (Originally 12/29/2020)    Depression Monitoring  08/31/2024    Breast cancer screen  05/10/2025    Cervical cancer screen  02/24/2027    Hepatitis A vaccine  Aged Out    Hib vaccine  Aged Out    Meningococcal (ACWY) vaccine  Aged Out             (applicable per patient's age: Cancer Screenings, Depression Screening, Fall Risk Screening, Immunizations)    AST (U/L)   Date Value   03/30/2023 14     ALT (U/L)   Date Value   03/30/2023 10     BUN (mg/dL)   Date Value   03/30/2023 12      (goal A1C is < 7)   (goal LDL is <100) need 30-50% reduction from baseline     BP Readings from Last 3 Encounters:   08/31/23 116/74   08/28/23 120/83   08/23/23 110/64    (goal /80)      All Future Testing planned in [facility]:  Lab Frequency Next Occurrence   MRI CERVICAL SPINE WO CONTRAST Once 06/26/2023   Sleep Study with PAP Titration Once 08/16/2023       Next Visit Date:  Future Appointments   Date Time Provider [address]   9/14/2023  3:45 PM Deondre Marie [facility] [place]   9/18/2023  1:30 PM Talia Hui PTA [facility] [place]   9/21/2023  1:45 PM RIGO WhittenZ PT [place]   9/25/2023 10:00 AM AIMEE Calderon - CNP [facility]   9/25/2023  3:00 PM RIGO Whitten PT [place]   9/26/2023  8:15 PM LYNNE SLEEP RM 1 [facility] [place] H[facility]   9/28/2023  1:30 PM RIOG Whitten PT [place]

## 2023-09-14 NOTE — PROGRESS NOTES
Phone: [phone]                 [facility]           Fax: [phone]                           Outpatient Physical Therapy                                                                            Daily Note    Patient: Lisa Valladares : 1965  [facility] #: [id number]   Referring Physician: AIMEE Bryant*  Date: 2023    Diagnosis: L hip pain, M25.552; L4-5 disc bulge, M51.36  Treatment Diagnosis: Low back pain    PT Insurance Information: [place]  Total # of Visits Approved: 10 Per Physician Order  Total # of Visits to Date: 2  No Show: 2  Canceled Appointment: 0    10/06/23 Plan of Care/Recert Due    Pre-Treatment Pain:  7/10  Subjective: Pt reports with 7/10 pain level. States she almost didnt come to therapy. Pt states pain is  LB    Exercises:  Exercise 2: aq;  frow/side walking 3-4 laps  Exercise 3: aq; back kicks/side kicks 15x,  marching  Exercise 4: aq; FSU/SSU  Exercise 5: aq; rows/extension        Assessment  Assessment: Pt with 7/10 pain in LB region. Pt started on aquatic therap to off load LB. Pt did well with no increase complaints of pain. Plan to progress exercise as tolerated    Activity Tolerance  Activity Tolerance: Patient tolerated treatment well    Patient Education  Patient Education: HEP  Pt verbalized/demonstrated good understanding:     [x] Yes         [] No, pt required further clarification. Post Treatment Pain:  7/10      Plan  Plan Frequency: 2  Plan weeks: 4       Goals  (Total # of Visits to Date: 2)      Short Term Goals  Time Frame for Short Term Goals: 3 weeks  Short Term Goal 1: Patient to initiate HEP for improved lumbar ROM and core strength. Short Term Goal 2: Pt to be instructed in gentle core strengthening to improve lumbar stability. Short Term Goal 3: Patient to tolerate 45 min of aquatic exercise to offload the spine and decrease low back pain.     Long Term Goals  Time Frame for Long Term Goals : 6 weeks  Long Term Goal 1: Patient

## 2023-09-18 ENCOUNTER — HOSPITAL ENCOUNTER (OUTPATIENT)
Dept: PHYSICAL THERAPY | Age: 58
Setting detail: THERAPIES SERIES
Discharge: HOME | End: 2023-09-18
Payer: COMMERCIAL

## 2023-09-18 NOTE — PROGRESS NOTES
Physical Therapy  [facility]  Inpatient/Observation/Outpatient Rehabilitation    Date: 2023  Patient Name: Mayelin Cain       [] Inpatient Acute/Observation       []  Outpatient  : 1965         [] Pt no showed for scheduled appointment    [] Pt refused/declined therapy at this time due to:           [x] Pt cancelled due to:  [] No Reason Given   [x] Sick/ill   [] Other:    Therapist/Assistant will attempt to see this patient, at our earliest opportunity.        Bertin Pierre Date: 2023

## 2023-09-21 ENCOUNTER — HOSPITAL ENCOUNTER (OUTPATIENT)
Dept: PHYSICAL THERAPY | Age: 58
Setting detail: THERAPIES SERIES
Discharge: HOME | End: 2023-09-21
Payer: COMMERCIAL

## 2023-09-21 PROCEDURE — 97113 AQUATIC THERAPY/EXERCISES: CPT

## 2023-09-21 NOTE — PROGRESS NOTES
Phone: [address]           Fax: [phone]                           Outpatient Physical Therapy                                                                            Daily Note    Patient: Amada Tamez : 1965  CSN #: [id number]   Referring Physician: AIMEE Spencer*  Date: 2023    Diagnosis: L hip pain, M25.552; L4-5 disc bulge, M51.36  Treatment Diagnosis: Low back pain    PT Insurance Information: [place]  Total # of Visits Approved: 10 Per Physician Order  Total # of Visits to Date: 3  No Show: 2  Canceled Appointment: 1    10/06/23 Plan of Care/Recert Due    Pre-Treatment Pain:  9/10  Subjective: pt a few minutes late to appointment, was talking with chaplian at home. pt reports she twisted her ankle and fell yesterday, reports her back has been spasming and pain has been 9/10 as a result. No other complaints, denies hitting her head. Exercises:  Exercise 1: HEP: glut sets, PPT's, LTR, SKTC  Exercise 2: aq: in offloaded enviornment to reduce compressive forces:  Exercise 3: aq: Semi dep water walking fwd, bwd and lateral x 3 laps each  Exercise 4: aq: Semi deep sink therex x 10  Exercise 5: aq: BTB rows/extension x 10 each  Exercise 6: aq: Semi deep fwd step up x 10  Exercise 7: aq: Seated bench jet massage to reduce muslce tension, Seated B LE nerve glide. Exercise 8: aq: Step stretch HS 30\" x 3 B LE    Assessment  Assessment: Continued to progress aquatic routine this date with focus on core activation, progressive walking, and gentle AROM therex in offloaded envriornment to reduce compressive forces. Also focused on pain relief with jet massage and stretches. Will continue to progress as tolerated. pain reduce to 6/10 following tx. Activity Tolerance  Activity Tolerance: Patient tolerated treatment well, Patient limited by pain    Patient Education  Patient Education: Aquatic therapy rationale and technique.   Pt

## 2023-09-25 ENCOUNTER — HOSPITAL ENCOUNTER (OUTPATIENT)
Dept: PHYSICAL THERAPY | Age: 58
Setting detail: THERAPIES SERIES
Discharge: HOME | End: 2023-09-25
Payer: COMMERCIAL

## 2023-09-25 NOTE — PROGRESS NOTES
[facility]  Inpatient/Observation/Outpatient Rehabilitation    Date: 2023  Patient Name: Sosa Ortez       [] Inpatient Acute/Observation       [x]  Outpatient  : 1965     [x] Pt no showed for scheduled appointment      Therapist/Assistant will attempt to see this patient, at our earliest opportunity.        [place], [place] [address] Date: 2023

## 2023-09-26 ENCOUNTER — HOSPITAL ENCOUNTER (OUTPATIENT)
Dept: SLEEP CENTER | Age: 58
Discharge: HOME | End: 2023-09-26
Payer: COMMERCIAL

## 2023-09-26 VITALS — HEIGHT: 64 IN | BODY MASS INDEX: 41.32 KG/M2 | WEIGHT: 242 LBS

## 2023-09-26 DIAGNOSIS — G47.33 OSA (OBSTRUCTIVE SLEEP APNEA): ICD-10-CM

## 2023-09-26 PROCEDURE — 95811 POLYSOM 6/>YRS CPAP 4/> PARM: CPT

## 2023-09-28 ENCOUNTER — HOSPITAL ENCOUNTER (OUTPATIENT)
Dept: PHYSICAL THERAPY | Age: 58
Setting detail: THERAPIES SERIES
Discharge: HOME | End: 2023-09-28
Payer: COMMERCIAL

## 2023-09-28 PROCEDURE — 97113 AQUATIC THERAPY/EXERCISES: CPT

## 2023-09-28 NOTE — PROGRESS NOTES
Phone: [address]           Fax: [phone]                           Outpatient Physical Therapy                                                                            Daily Note    Patient: Andrez Sellers : 1965  CSN #: [id number]   Referring Physician: AIMEE Parisi*  Date: 2023    Diagnosis: L hip pain, M25.552; L4-5 disc bulge, M51.36  Treatment Diagnosis: Low back pain    PT Insurance Information: [place]  Total # of Visits Approved: 10 Per Physician Order  Total # of Visits to Date: 4  No Show: 3  Canceled Appointment: 1    10/06/23 Plan of Care/Recert Due    Pre-Treatment Pain:  8/10  Subjective: pt reports 8/10 L hip and LBP prior to treatment. Exercises:  Exercise 1: HEP: glut sets, PPT's, LTR, SKTC  Exercise 2: aq: in offloaded enviornment to reduce compressive forces:  Exercise 3: aq: Shallow water walking fwd, bwd and lateral x 3 laps each  Exercise 4: aq: Shallow sink therex x 15  Exercise 5: aq: BTB rows/extension x 15 each  Exercise 6: aq: Semi deep fwd step up and step down x 10  Exercise 7: aq: Seated bench jet massage to reduce muscle tension while performing piriformis stretch B LE 30\" x 3. Seated B LE nerve glide x 10 each. Exercise 8: aq: Step stretch HS 30\" x 3 B LE  Exercise 9: aq: Semi deep green handbells 90/90 and chest fly x 10 each  Exercise 10: aq: Deep well relax 5 minutes    Assessment  Assessment: Lumbar ROM: flexion to toes and SB to mid patella on both sides with increased pain reported. MMT B LE grossly 4/5 besies L hip flexion 4-/5, core 4-/5. Continued to progress aquatic routine this date with increased LE reps and implenting new UE therex in offloaded envriornment to reduce compressive forces. Also focused on pain relief with jet massage, stretches and deep well relaxation. Will continue to progress as tolerated. Pain reduced to 6-7/10 following tx.  Patient to schedule 4 more visits at

## 2023-10-01 DIAGNOSIS — F41.9 ANXIETY: ICD-10-CM

## 2023-10-02 ENCOUNTER — HOSPITAL ENCOUNTER (OUTPATIENT)
Dept: PHYSICAL THERAPY | Age: 58
Setting detail: THERAPIES SERIES
Discharge: HOME | End: 2023-10-02
Payer: COMMERCIAL

## 2023-10-02 ENCOUNTER — OFFICE VISIT (OUTPATIENT)
Dept: PRIMARY CARE CLINIC | Age: 58
End: 2023-10-02
Payer: COMMERCIAL

## 2023-10-02 VITALS
TEMPERATURE: 98.7 F | SYSTOLIC BLOOD PRESSURE: 124 MMHG | DIASTOLIC BLOOD PRESSURE: 78 MMHG | OXYGEN SATURATION: 98 % | HEART RATE: 67 BPM | WEIGHT: 240.6 LBS | BODY MASS INDEX: 41.3 KG/M2

## 2023-10-02 DIAGNOSIS — L30.9 ECZEMA, UNSPECIFIED TYPE: ICD-10-CM

## 2023-10-02 DIAGNOSIS — G25.81 RLS (RESTLESS LEGS SYNDROME): ICD-10-CM

## 2023-10-02 DIAGNOSIS — F41.9 ANXIETY: Primary | ICD-10-CM

## 2023-10-02 DIAGNOSIS — Z23 NEED FOR INFLUENZA VACCINATION: ICD-10-CM

## 2023-10-02 DIAGNOSIS — M25.552 LEFT HIP PAIN: ICD-10-CM

## 2023-10-02 PROCEDURE — 97113 AQUATIC THERAPY/EXERCISES: CPT

## 2023-10-02 PROCEDURE — G8427 DOCREV CUR MEDS BY ELIG CLIN: HCPCS | Performed by: NURSE PRACTITIONER

## 2023-10-02 PROCEDURE — 90674 CCIIV4 VAC NO PRSV 0.5 ML IM: CPT | Performed by: NURSE PRACTITIONER

## 2023-10-02 PROCEDURE — 3078F DIAST BP <80 MM HG: CPT | Performed by: NURSE PRACTITIONER

## 2023-10-02 PROCEDURE — G8417 CALC BMI ABV UP PARAM F/U: HCPCS | Performed by: NURSE PRACTITIONER

## 2023-10-02 PROCEDURE — 3074F SYST BP LT 130 MM HG: CPT | Performed by: NURSE PRACTITIONER

## 2023-10-02 PROCEDURE — 99214 OFFICE O/P EST MOD 30 MIN: CPT | Performed by: NURSE PRACTITIONER

## 2023-10-02 PROCEDURE — 1036F TOBACCO NON-USER: CPT | Performed by: NURSE PRACTITIONER

## 2023-10-02 PROCEDURE — G8482 FLU IMMUNIZE ORDER/ADMIN: HCPCS | Performed by: NURSE PRACTITIONER

## 2023-10-02 PROCEDURE — 3017F COLORECTAL CA SCREEN DOC REV: CPT | Performed by: NURSE PRACTITIONER

## 2023-10-02 RX ORDER — LORAZEPAM 1 MG/1
TABLET ORAL
Qty: 90 TABLET | Refills: 0 | Status: CANCELLED | OUTPATIENT
Start: 2023-10-02

## 2023-10-02 RX ORDER — LORAZEPAM 1 MG/1
TABLET ORAL
Qty: 90 TABLET | Refills: 0 | Status: SHIPPED | OUTPATIENT
Start: 2023-10-02 | End: 2023-11-01

## 2023-10-02 RX ORDER — LIDOCAINE 50 MG/G
1 PATCH TOPICAL DAILY
Qty: 30 PATCH | Refills: 0 | Status: SHIPPED | OUTPATIENT
Start: 2023-10-02 | End: 2023-11-01

## 2023-10-02 RX ORDER — ONDANSETRON 4 MG/1
4 TABLET, FILM COATED ORAL EVERY 8 HOURS PRN
Qty: 90 TABLET | Refills: 1 | Status: SHIPPED | OUTPATIENT
Start: 2023-10-02

## 2023-10-02 RX ORDER — METOPROLOL TARTRATE 50 MG/1
TABLET, FILM COATED ORAL
Qty: 90 TABLET | Refills: 1 | Status: SHIPPED | OUTPATIENT
Start: 2023-10-02

## 2023-10-02 RX ORDER — PIMECROLIMUS 10 MG/G
CREAM TOPICAL
Qty: 30 G | Refills: 0 | Status: SHIPPED | OUTPATIENT
Start: 2023-10-02

## 2023-10-02 RX ORDER — LIDOCAINE 50 MG/G
1 PATCH TOPICAL DAILY
Qty: 10 PATCH | Refills: 0 | Status: SHIPPED | OUTPATIENT
Start: 2023-10-02 | End: 2023-10-02

## 2023-10-02 ASSESSMENT — PATIENT HEALTH QUESTIONNAIRE - PHQ9
SUM OF ALL RESPONSES TO PHQ QUESTIONS 1-9: 0
1. LITTLE INTEREST OR PLEASURE IN DOING THINGS: 0
SUM OF ALL RESPONSES TO PHQ9 QUESTIONS 1 & 2: 0
3. TROUBLE FALLING OR STAYING ASLEEP: 0
SUM OF ALL RESPONSES TO PHQ QUESTIONS 1-9: 0
10. IF YOU CHECKED OFF ANY PROBLEMS, HOW DIFFICULT HAVE THESE PROBLEMS MADE IT FOR YOU TO DO YOUR WORK, TAKE CARE OF THINGS AT HOME, OR GET ALONG WITH OTHER PEOPLE: 0
2. FEELING DOWN, DEPRESSED OR HOPELESS: 0
6. FEELING BAD ABOUT YOURSELF - OR THAT YOU ARE A FAILURE OR HAVE LET YOURSELF OR YOUR FAMILY DOWN: 0
4. FEELING TIRED OR HAVING LITTLE ENERGY: 0
9. THOUGHTS THAT YOU WOULD BE BETTER OFF DEAD, OR OF HURTING YOURSELF: 0
5. POOR APPETITE OR OVEREATING: 0
SUM OF ALL RESPONSES TO PHQ QUESTIONS 1-9: 0
SUM OF ALL RESPONSES TO PHQ QUESTIONS 1-9: 0
7. TROUBLE CONCENTRATING ON THINGS, SUCH AS READING THE NEWSPAPER OR WATCHING TELEVISION: 0
8. MOVING OR SPEAKING SO SLOWLY THAT OTHER PEOPLE COULD HAVE NOTICED. OR THE OPPOSITE, BEING SO FIGETY OR RESTLESS THAT YOU HAVE BEEN MOVING AROUND A LOT MORE THAN USUAL: 0

## 2023-10-02 ASSESSMENT — ENCOUNTER SYMPTOMS
EYES NEGATIVE: 1
RESPIRATORY NEGATIVE: 1
GASTROINTESTINAL NEGATIVE: 1

## 2023-10-02 NOTE — PATIENT INSTRUCTIONS
SURVEY:     You may be receiving a survey from [employer] regarding your visit today. Please complete the survey to enable us to provide the highest quality of care to you and your family. If you cannot score us a very good on any question, please call the office to discuss how we could have made your experience a very good one.      Thank you,    Anya Schneider, APRN-CNP  Siobhan Yap, APRN-CNP  Christa Pelaez, DENISEN  Rosey Schlatter, MELVA Carrasco, MELVA Mckeon, CMA  Leslie, PCA  Sonia, PM

## 2023-10-02 NOTE — PROGRESS NOTES
After obtaining consent, and per orders of Trista Doshi CNP, injection of Flucelvax given in Left deltoid by Kezia Avila CMA. Patient instructed to remain in clinic for 20 minutes afterwards, and to report any adverse reaction to me immediately. Vaccine Information Sheet, \"Influenza - Inactivated\"  given to Kala Barrera, or parent/legal guardian of  Kala Barrera and verbalized understanding. Patient responses:    Have you ever had a reaction to a flu vaccine? No  Are you able to eat eggs without adverse effects? No  Do you have any current illness? No  Have you ever had Guillian [place] Syndrome? No    Flu vaccine given per order. Please see immunization tab.
Pupils are equal, round, and reactive to light. Cardiovascular:      Rate and Rhythm: Normal rate and regular rhythm. Heart sounds: Normal heart sounds. No murmur heard. Pulmonary:      Effort: Pulmonary effort is normal.      Breath sounds: Normal breath sounds. Abdominal:      General: Bowel sounds are normal.      Palpations: Abdomen is soft. Musculoskeletal:         General: Normal range of motion. Cervical back: Normal range of motion and neck supple. Skin:     General: Skin is warm. Capillary Refill: Capillary refill takes less than 2 seconds. Findings: Erythema present. Comments: Bilateral cheeks with erythema and flaking skin   Neurological:      General: No focal deficit present. Mental Status: She is alert and oriented to person, place, and time. Psychiatric:         Mood and Affect: Mood normal.         Behavior: Behavior normal.         Thought Content:  Thought content normal.         Judgment: Judgment normal.         Data:     Lab Results   Component Value Date/Time     03/30/2023 10:15 PM    K 3.7 03/30/2023 10:15 PM     03/30/2023 10:15 PM    CO2 27 03/30/2023 10:15 PM    BUN 12 03/30/2023 10:15 PM    CREATININE 0.72 03/30/2023 10:15 PM    GLUCOSE 132 03/30/2023 10:15 PM    PROT 7.4 03/30/2023 10:15 PM    LABALBU 4.0 03/30/2023 10:15 PM    LABALBU 4.4 11/18/2011 10:06 PM    BILITOT 0.3 03/30/2023 10:15 PM    ALKPHOS 85 03/30/2023 10:15 PM    AST 14 03/30/2023 10:15 PM    ALT 10 03/30/2023 10:15 PM     Lab Results   Component Value Date/Time    WBC 9.6 03/31/2023 05:50 AM    RBC 4.62 03/31/2023 05:50 AM    RBC 4.70 11/18/2011 10:06 PM    HGB 12.6 03/31/2023 05:50 AM    HCT 40.1 03/31/2023 05:50 AM    MCV 86.8 03/31/2023 05:50 AM    MCH 27.3 03/31/2023 05:50 AM    MCHC 31.4 03/31/2023 05:50 AM    RDW 14.3 03/31/2023 05:50 AM     03/31/2023 05:50 AM    MPV 10.1 03/31/2023 05:50 AM     No results found for: \"TSH\"  No results found for: \"CHOL\",

## 2023-10-03 ENCOUNTER — TELEPHONE (OUTPATIENT)
Dept: PRIMARY CARE CLINIC | Age: 58
End: 2023-10-03

## 2023-10-03 LAB — STATUS: NORMAL

## 2023-10-03 NOTE — TELEPHONE ENCOUNTER
Patient notified and verbalized understanding. Patient states they had told her somebody would be calling her regarding the CPAP.

## 2023-10-03 NOTE — TELEPHONE ENCOUNTER
----- Message from Romana Gayer, APRN - CNP sent at 10/3/2023 10:24 AM EDT -----  Please notify patient of positive sleep study. Ask her if they have already made arrangements for Cpap?     Thanks James Barnes

## 2023-10-05 ENCOUNTER — HOSPITAL ENCOUNTER (OUTPATIENT)
Dept: PHYSICAL THERAPY | Age: 58
Setting detail: THERAPIES SERIES
Discharge: HOME | End: 2023-10-05
Payer: COMMERCIAL

## 2023-10-05 NOTE — PROGRESS NOTES
[facility]  Inpatient/Observation/Outpatient Rehabilitation    Date: 10/5/2023  Patient Name: Kamryn Velazco       [] Inpatient Acute/Observation       [x]  Outpatient  : 1965       [x] Pt no showed for scheduled appointment      Therapist/Assistant will attempt to see this patient, at our earliest opportunity.        Reena Prieto [id number] Date: 10/5/2023

## 2023-10-09 ENCOUNTER — HOSPITAL ENCOUNTER (OUTPATIENT)
Dept: PHYSICAL THERAPY | Age: 58
Setting detail: THERAPIES SERIES
Discharge: HOME | End: 2023-10-09
Payer: COMMERCIAL

## 2023-10-09 NOTE — PROGRESS NOTES
Physical Therapy  [facility]  Inpatient/Observation/Outpatient Rehabilitation    Date: 10/9/2023  Patient Name: Andrez Sellers       [] Inpatient Acute/Observation       []  Outpatient  : 1965       [] Pt no showed for scheduled appointment    [] Pt refused/declined therapy at this time due to:           [x] Pt cancelled due to:  [] No Reason Given   [x] Sick/ill   [] Other:    Therapist/Assistant will attempt to see this patient, at our earliest opportunity.        Esme Dalton Date: 10/9/2023

## 2023-10-10 ENCOUNTER — OFFICE VISIT (OUTPATIENT)
Dept: PRIMARY CARE CLINIC | Age: 58
End: 2023-10-10
Payer: COMMERCIAL

## 2023-10-10 ENCOUNTER — HOSPITAL ENCOUNTER (OUTPATIENT)
Age: 58
Discharge: HOME | End: 2023-10-10
Payer: COMMERCIAL

## 2023-10-10 VITALS
BODY MASS INDEX: 41.2 KG/M2 | TEMPERATURE: 98 F | HEART RATE: 67 BPM | RESPIRATION RATE: 18 BRPM | WEIGHT: 240 LBS | DIASTOLIC BLOOD PRESSURE: 80 MMHG | SYSTOLIC BLOOD PRESSURE: 122 MMHG | OXYGEN SATURATION: 98 %

## 2023-10-10 DIAGNOSIS — R61 DIAPHORESIS: Primary | ICD-10-CM

## 2023-10-10 DIAGNOSIS — R42 VERTIGO: ICD-10-CM

## 2023-10-10 DIAGNOSIS — R61 DIAPHORESIS: ICD-10-CM

## 2023-10-10 DIAGNOSIS — R79.82 ELEVATED C-REACTIVE PROTEIN (CRP): ICD-10-CM

## 2023-10-10 LAB
ALBUMIN SERPL-MCNC: 4.2 G/DL (ref 3.5–5.2)
ALBUMIN/GLOB SERPL: 1.1 {RATIO} (ref 1–2.5)
ALP SERPL-CCNC: 72 U/L (ref 35–104)
ALT SERPL-CCNC: 8 U/L (ref 5–33)
ANION GAP SERPL CALCULATED.3IONS-SCNC: 10 MMOL/L (ref 9–17)
AST SERPL-CCNC: 20 U/L
BILIRUB DIRECT SERPL-MCNC: <0.1 MG/DL
BILIRUB INDIRECT SERPL-MCNC: NORMAL MG/DL (ref 0–1)
BILIRUB SERPL-MCNC: 0.4 MG/DL (ref 0.3–1.2)
BUN SERPL-MCNC: 11 MG/DL (ref 6–20)
CALCIUM SERPL-MCNC: 9.6 MG/DL (ref 8.6–10.4)
CHLORIDE SERPL-SCNC: 100 MMOL/L (ref 98–107)
CO2 SERPL-SCNC: 28 MMOL/L (ref 20–31)
CREAT SERPL-MCNC: 0.8 MG/DL (ref 0.5–0.9)
CRP SERPL HS-MCNC: 11.4 MG/L (ref 0–5)
GFR SERPL CREATININE-BSD FRML MDRD: >60 ML/MIN/1.73M2
GLUCOSE SERPL-MCNC: 96 MG/DL (ref 70–99)
POTASSIUM SERPL-SCNC: 5.2 MMOL/L (ref 3.7–5.3)
PROT SERPL-MCNC: 7.9 G/DL (ref 6.4–8.3)
SODIUM SERPL-SCNC: 138 MMOL/L (ref 135–144)
TSH SERPL DL<=0.05 MIU/L-ACNC: 3.02 UIU/ML (ref 0.3–5)

## 2023-10-10 PROCEDURE — 84443 ASSAY THYROID STIM HORMONE: CPT

## 2023-10-10 PROCEDURE — 86140 C-REACTIVE PROTEIN: CPT

## 2023-10-10 PROCEDURE — 82248 BILIRUBIN DIRECT: CPT

## 2023-10-10 PROCEDURE — G8427 DOCREV CUR MEDS BY ELIG CLIN: HCPCS | Performed by: NURSE PRACTITIONER

## 2023-10-10 PROCEDURE — 82550 ASSAY OF CK (CPK): CPT

## 2023-10-10 PROCEDURE — G8417 CALC BMI ABV UP PARAM F/U: HCPCS | Performed by: NURSE PRACTITIONER

## 2023-10-10 PROCEDURE — 3079F DIAST BP 80-89 MM HG: CPT | Performed by: NURSE PRACTITIONER

## 2023-10-10 PROCEDURE — 80053 COMPREHEN METABOLIC PANEL: CPT

## 2023-10-10 PROCEDURE — 84484 ASSAY OF TROPONIN QUANT: CPT

## 2023-10-10 PROCEDURE — 36415 COLL VENOUS BLD VENIPUNCTURE: CPT

## 2023-10-10 PROCEDURE — G8482 FLU IMMUNIZE ORDER/ADMIN: HCPCS | Performed by: NURSE PRACTITIONER

## 2023-10-10 PROCEDURE — 3017F COLORECTAL CA SCREEN DOC REV: CPT | Performed by: NURSE PRACTITIONER

## 2023-10-10 PROCEDURE — 83036 HEMOGLOBIN GLYCOSYLATED A1C: CPT

## 2023-10-10 PROCEDURE — 1036F TOBACCO NON-USER: CPT | Performed by: NURSE PRACTITIONER

## 2023-10-10 PROCEDURE — 3074F SYST BP LT 130 MM HG: CPT | Performed by: NURSE PRACTITIONER

## 2023-10-10 PROCEDURE — 99214 OFFICE O/P EST MOD 30 MIN: CPT | Performed by: NURSE PRACTITIONER

## 2023-10-10 ASSESSMENT — ENCOUNTER SYMPTOMS
EYES NEGATIVE: 1
ALLERGIC/IMMUNOLOGIC NEGATIVE: 1
RESPIRATORY NEGATIVE: 1
NAUSEA: 1
VOMITING: 0

## 2023-10-10 NOTE — PROGRESS NOTES
[facility] PHYSICIANS  Dionna Beck, [address] PRIMARY CARE  [address] [facility] [address] [facility]  Dept: [phone]  Dept Fax: [phone]      Name: Amada Tamez  : 1965         Chief Complaint:     Chief Complaint   Patient presents with    Sweats     X1 week    Nausea     X1 week    Fatigue     X1 week    loss of appetite      X1 week       History of Present Illness:      Amada Tamez is a 63 y.o.  female who presents with Sweats (X1 week), Nausea (X1 week), Fatigue (X1 week), and loss of appetite  (X1 week)      HPI  Tanisha is here today for complaints of diaphoresis, nausea, and fatigue for 5 days. She has been having profuse sweating she states for no reason. She states the sweating will pour down her face and she gets drenched with the littlest activity. She had a little rhinorrhea. She has been having increased fatigue with the episodes. She has increased her water intake and rested. She has not had an episode today with the sweating. When the sweating happens she will get nauseated and increased fatigue. She has not had an appetite for a week. She has been getting dizzy with the episodes. No chest pain. No vomiting or diarrhea. She states she has been craving some salt. No night sweats. She is only sweating with activity. She is mainly a soda drinker but has increased her water intake. No fever. She has not had any new medications. She is on Wellbutrin and Zoloft but has been on them for months.     Past Medical History:     Past Medical History:   Diagnosis Date    Arthritis     Asthma     COPD (chronic obstructive pulmonary disease) ([address] [facility])     Eczema of face     Genital warts     Hypertension     Psychiatric problem     Restless legs     Sleep apnea     Stroke [facility])       Reviewed all health maintenance requirements and ordered appropriate tests  Health Maintenance Due   Topic Date Due    Diabetes screen  Never done    Lipids  Never done    Colorectal

## 2023-10-11 ENCOUNTER — TELEPHONE (OUTPATIENT)
Dept: PRIMARY CARE CLINIC | Age: 58
End: 2023-10-11

## 2023-10-11 DIAGNOSIS — R79.82 ELEVATED C-REACTIVE PROTEIN (CRP): ICD-10-CM

## 2023-10-11 DIAGNOSIS — R61 DIAPHORESIS: ICD-10-CM

## 2023-10-11 DIAGNOSIS — R42 VERTIGO: ICD-10-CM

## 2023-10-11 LAB
CK SERPL-CCNC: 55 U/L (ref 26–192)
EST. AVERAGE GLUCOSE BLD GHB EST-MCNC: 100 MG/DL
HBA1C MFR BLD: 5.1 % (ref 4–6)
TROPONIN I SERPL HS-MCNC: <6 NG/L (ref 0–14)

## 2023-10-11 NOTE — TELEPHONE ENCOUNTER
----- Message from AIMEE Alicea CNP sent at 10/11/2023 12:52 PM EDT -----  Please notify patient of normal lab results.   Thanks Marcy

## 2023-10-11 NOTE — TELEPHONE ENCOUNTER
----- Message from AIMEE Green CNP sent at 10/11/2023 10:39 AM EDT -----  Please notify patient of normal lab results.   Thanks Miller Raymond

## 2023-10-11 NOTE — TELEPHONE ENCOUNTER
----- Message from AIMEE Parisi CNP sent at 10/10/2023  3:31 PM EDT -----  Please notify patient of normal elevated CRP results. I have ordered a CT for her and additional labs if you could call lab to see it they have extra blood they can use.   Thanks Mabelene Buerger

## 2023-10-12 ENCOUNTER — APPOINTMENT (OUTPATIENT)
Dept: CT IMAGING | Age: 58
End: 2023-10-12
Payer: COMMERCIAL

## 2023-10-12 ENCOUNTER — HOSPITAL ENCOUNTER (OUTPATIENT)
Dept: PHYSICAL THERAPY | Age: 58
Setting detail: THERAPIES SERIES
Discharge: HOME | End: 2023-10-12
Payer: COMMERCIAL

## 2023-10-12 ENCOUNTER — TELEPHONE (OUTPATIENT)
Dept: PRIMARY CARE CLINIC | Age: 58
End: 2023-10-12

## 2023-10-12 ENCOUNTER — HOSPITAL ENCOUNTER (EMERGENCY)
Age: 58
Discharge: HOME | End: 2023-10-13
Attending: STUDENT IN AN ORGANIZED HEALTH CARE EDUCATION/TRAINING PROGRAM
Payer: COMMERCIAL

## 2023-10-12 ENCOUNTER — APPOINTMENT (OUTPATIENT)
Dept: GENERAL RADIOLOGY | Age: 58
End: 2023-10-12
Payer: COMMERCIAL

## 2023-10-12 VITALS
WEIGHT: 240 LBS | HEART RATE: 86 BPM | SYSTOLIC BLOOD PRESSURE: 112 MMHG | BODY MASS INDEX: 40.97 KG/M2 | OXYGEN SATURATION: 98 % | RESPIRATION RATE: 18 BRPM | TEMPERATURE: 98.1 F | HEIGHT: 64 IN | DIASTOLIC BLOOD PRESSURE: 80 MMHG

## 2023-10-12 DIAGNOSIS — N10 ACUTE PYELONEPHRITIS: Primary | ICD-10-CM

## 2023-10-12 LAB
ALBUMIN SERPL-MCNC: 4 G/DL (ref 3.5–5.2)
ALBUMIN/GLOB SERPL: 1.2 {RATIO} (ref 1–2.5)
ALP SERPL-CCNC: 73 U/L (ref 35–104)
ALT SERPL-CCNC: 8 U/L (ref 5–33)
AMPHET UR QL SCN: NEGATIVE
ANION GAP SERPL CALCULATED.3IONS-SCNC: 10 MMOL/L (ref 9–17)
AST SERPL-CCNC: 13 U/L
BACTERIA URNS QL MICRO: ABNORMAL
BARBITURATES UR QL SCN: NEGATIVE
BASOPHILS # BLD: 0.14 K/UL (ref 0–0.2)
BASOPHILS NFR BLD: 1 % (ref 0–2)
BENZODIAZ UR QL: NEGATIVE
BILIRUB SERPL-MCNC: 0.2 MG/DL (ref 0.3–1.2)
BILIRUB UR QL STRIP: NEGATIVE
BUN SERPL-MCNC: 10 MG/DL (ref 6–20)
BUN/CREAT SERPL: 13 (ref 9–20)
BUPRENORPHINE UR QL: NEGATIVE
CALCIUM SERPL-MCNC: 9.1 MG/DL (ref 8.6–10.4)
CANNABINOIDS UR QL SCN: NEGATIVE
CHLORIDE SERPL-SCNC: 100 MMOL/L (ref 98–107)
CLARITY UR: CLEAR
CO2 SERPL-SCNC: 27 MMOL/L (ref 20–31)
COCAINE UR QL SCN: NEGATIVE
COLOR UR: YELLOW
CREAT SERPL-MCNC: 0.8 MG/DL (ref 0.5–0.9)
CRYSTALS URNS MICRO: ABNORMAL /HPF
EKG ATRIAL RATE: 80 BPM
EKG P AXIS: 32 DEGREES
EKG P-R INTERVAL: 154 MS
EKG Q-T INTERVAL: 394 MS
EKG QRS DURATION: 78 MS
EKG QTC CALCULATION (BAZETT): 454 MS
EKG R AXIS: -3 DEGREES
EKG T AXIS: 43 DEGREES
EKG VENTRICULAR RATE: 80 BPM
EOSINOPHIL # BLD: 0.37 K/UL (ref 0–0.44)
EOSINOPHILS RELATIVE PERCENT: 3 % (ref 1–4)
EPI CELLS #/AREA URNS HPF: ABNORMAL /HPF (ref 0–25)
ERYTHROCYTE [DISTWIDTH] IN BLOOD BY AUTOMATED COUNT: 13.9 % (ref 11.8–14.4)
FENTANYL UR QL: NEGATIVE
GFR SERPL CREATININE-BSD FRML MDRD: >60 ML/MIN/1.73M2
GLUCOSE SERPL-MCNC: 89 MG/DL (ref 70–99)
GLUCOSE UR STRIP-MCNC: NEGATIVE MG/DL
HCT VFR BLD AUTO: 45 % (ref 36.3–47.1)
HGB BLD-MCNC: 14.1 G/DL (ref 11.9–15.1)
HGB UR QL STRIP.AUTO: NEGATIVE
IMM GRANULOCYTES # BLD AUTO: 0.07 K/UL (ref 0–0.3)
IMM GRANULOCYTES NFR BLD: 1 %
KETONES UR STRIP-MCNC: NEGATIVE MG/DL
LACTATE BLDV-SCNC: 1.8 MMOL/L (ref 0.5–2.2)
LEUKOCYTE ESTERASE UR QL STRIP: ABNORMAL
LIPASE SERPL-CCNC: 50 U/L (ref 13–60)
LYMPHOCYTES NFR BLD: 3.86 K/UL (ref 1.1–3.7)
LYMPHOCYTES RELATIVE PERCENT: 28 % (ref 24–43)
MAGNESIUM SERPL-MCNC: 2 MG/DL (ref 1.6–2.6)
MCH RBC QN AUTO: 28.5 PG (ref 25.2–33.5)
MCHC RBC AUTO-ENTMCNC: 31.3 G/DL (ref 28.4–34.8)
MCV RBC AUTO: 90.9 FL (ref 82.6–102.9)
METHADONE UR QL: NEGATIVE
MONOCYTES NFR BLD: 1.02 K/UL (ref 0.1–1.2)
MONOCYTES NFR BLD: 7 % (ref 3–12)
NEUTROPHILS NFR BLD: 60 % (ref 36–65)
NEUTS SEG NFR BLD: 8.58 K/UL (ref 1.5–8.1)
NITRITE UR QL STRIP: NEGATIVE
NRBC BLD-RTO: 0 PER 100 WBC
OPIATES UR QL SCN: POSITIVE
OXYCODONE UR QL SCN: NEGATIVE
PCP UR QL SCN: NEGATIVE
PH UR STRIP: 6.5 [PH] (ref 5–9)
PLATELET # BLD AUTO: 286 K/UL (ref 138–453)
PMV BLD AUTO: 10 FL (ref 8.1–13.5)
POTASSIUM SERPL-SCNC: 4.5 MMOL/L (ref 3.7–5.3)
PROT SERPL-MCNC: 7.3 G/DL (ref 6.4–8.3)
PROT UR STRIP-MCNC: NEGATIVE MG/DL
RBC # BLD AUTO: 4.95 M/UL (ref 3.95–5.11)
RBC #/AREA URNS HPF: ABNORMAL /HPF (ref 0–2)
SARS-COV-2 RDRP RESP QL NAA+PROBE: NOT DETECTED
SODIUM SERPL-SCNC: 137 MMOL/L (ref 135–144)
SP GR UR STRIP: 1.02 (ref 1.01–1.02)
SPECIMEN DESCRIPTION: NORMAL
TEST INFORMATION: ABNORMAL
TROPONIN I SERPL HS-MCNC: 6 NG/L (ref 0–14)
TSH SERPL DL<=0.05 MIU/L-ACNC: 3.43 UIU/ML (ref 0.3–5)
UROBILINOGEN UR STRIP-ACNC: NORMAL EU/DL (ref 0–1)
WBC #/AREA URNS HPF: ABNORMAL /HPF (ref 0–5)
WBC OTHER # BLD: 14 K/UL (ref 3.5–11.3)

## 2023-10-12 PROCEDURE — 71045 X-RAY EXAM CHEST 1 VIEW: CPT

## 2023-10-12 PROCEDURE — 81001 URINALYSIS AUTO W/SCOPE: CPT

## 2023-10-12 PROCEDURE — 99285 EMERGENCY DEPT VISIT HI MDM: CPT

## 2023-10-12 PROCEDURE — 74176 CT ABD & PELVIS W/O CONTRAST: CPT

## 2023-10-12 PROCEDURE — 84443 ASSAY THYROID STIM HORMONE: CPT

## 2023-10-12 PROCEDURE — 80053 COMPREHEN METABOLIC PANEL: CPT

## 2023-10-12 PROCEDURE — 84484 ASSAY OF TROPONIN QUANT: CPT

## 2023-10-12 PROCEDURE — 84439 ASSAY OF FREE THYROXINE: CPT

## 2023-10-12 PROCEDURE — 80307 DRUG TEST PRSMV CHEM ANLYZR: CPT

## 2023-10-12 PROCEDURE — 83690 ASSAY OF LIPASE: CPT

## 2023-10-12 PROCEDURE — 83605 ASSAY OF LACTIC ACID: CPT

## 2023-10-12 PROCEDURE — 85025 COMPLETE CBC W/AUTO DIFF WBC: CPT

## 2023-10-12 PROCEDURE — 36415 COLL VENOUS BLD VENIPUNCTURE: CPT

## 2023-10-12 PROCEDURE — 2580000003 HC RX 258: Performed by: STUDENT IN AN ORGANIZED HEALTH CARE EDUCATION/TRAINING PROGRAM

## 2023-10-12 PROCEDURE — 70450 CT HEAD/BRAIN W/O DYE: CPT

## 2023-10-12 PROCEDURE — 93005 ELECTROCARDIOGRAM TRACING: CPT | Performed by: STUDENT IN AN ORGANIZED HEALTH CARE EDUCATION/TRAINING PROGRAM

## 2023-10-12 PROCEDURE — 93010 ELECTROCARDIOGRAM REPORT: CPT | Performed by: FAMILY MEDICINE

## 2023-10-12 PROCEDURE — 87635 SARS-COV-2 COVID-19 AMP PRB: CPT

## 2023-10-12 PROCEDURE — 83735 ASSAY OF MAGNESIUM: CPT

## 2023-10-12 PROCEDURE — 6360000002 HC RX W HCPCS: Performed by: STUDENT IN AN ORGANIZED HEALTH CARE EDUCATION/TRAINING PROGRAM

## 2023-10-12 PROCEDURE — 97113 AQUATIC THERAPY/EXERCISES: CPT

## 2023-10-12 PROCEDURE — 96374 THER/PROPH/DIAG INJ IV PUSH: CPT

## 2023-10-12 PROCEDURE — 87086 URINE CULTURE/COLONY COUNT: CPT

## 2023-10-12 RX ORDER — 0.9 % SODIUM CHLORIDE 0.9 %
1000 INTRAVENOUS SOLUTION INTRAVENOUS ONCE
Status: COMPLETED | OUTPATIENT
Start: 2023-10-12 | End: 2023-10-12

## 2023-10-12 RX ORDER — ONDANSETRON 2 MG/ML
4 INJECTION INTRAMUSCULAR; INTRAVENOUS ONCE
Status: COMPLETED | OUTPATIENT
Start: 2023-10-12 | End: 2023-10-12

## 2023-10-12 RX ADMIN — ONDANSETRON 4 MG: 2 INJECTION INTRAMUSCULAR; INTRAVENOUS at 22:35

## 2023-10-12 RX ADMIN — SODIUM CHLORIDE 1000 ML: 9 INJECTION, SOLUTION INTRAVENOUS at 22:35

## 2023-10-12 ASSESSMENT — ENCOUNTER SYMPTOMS
ABDOMINAL PAIN: 0
RHINORRHEA: 0
EYE PAIN: 0
SHORTNESS OF BREATH: 0
VOMITING: 0
NAUSEA: 1

## 2023-10-12 NOTE — TELEPHONE ENCOUNTER
Patient called and said that she still is not feeling well she is very fatigue, nauseas, cant keep anything down and she is feeling the worst she ever has she stated. She has been staying in bed all day.

## 2023-10-12 NOTE — PROGRESS NOTES
treatment well, Patient limited by pain    Patient Education  Patient Education: Aquatic therapy rationale and technique. Pt verbalized/demonstrated good understanding:     [x] Yes         [] No, pt required further clarification. Post Treatment Pain:  7/10      Plan  Plan Frequency: 2  Plan weeks: 4       Goals  (Total # of Visits to Date: 6)      Short Term Goals  Time Frame for Short Term Goals: 3 weeks  Short Term Goal 1: Patient to initiate HEP for improved lumbar ROM and core strength. Short Term Goal 2: Pt to be instructed in gentle core strengthening to improve lumbar stability--met  Short Term Goal 3: Patient to tolerate 45 min of aquatic exercise to offload the spine and decrease low back pain--met    Long Term Goals  Time Frame for Long Term Goals : 6 weeks  Long Term Goal 1: Patient to be independent and compliant with HEP and aquatic exercise. Long Term Goal 2: Pt to have improved lumbar AROM flexion: 2in below knees and B SB: to knees with no increase in pain for improved mobility--progressing  Long Term Goal 3: Patient to have improved core and B hip strength >/=4/5 grossly all planes for improved lumbar stability. Long Term Goal 4: Patient to report >/=50% improvement in symptoms to improve QOL.     Minutes Tracking:  Time In: 1411  Time Out: 3536  Minutes: 40  Timed Code Treatment Minutes: [address], [address] [facility]     Date: 10/12/2023

## 2023-10-13 LAB — T4 FREE SERPL-MCNC: 0.9 NG/DL (ref 0.9–1.7)

## 2023-10-13 PROCEDURE — 6370000000 HC RX 637 (ALT 250 FOR IP): Performed by: STUDENT IN AN ORGANIZED HEALTH CARE EDUCATION/TRAINING PROGRAM

## 2023-10-13 RX ORDER — CIPROFLOXACIN 500 MG/1
500 TABLET, FILM COATED ORAL 2 TIMES DAILY
Qty: 14 TABLET | Refills: 0 | Status: SHIPPED | OUTPATIENT
Start: 2023-10-13 | End: 2023-10-20

## 2023-10-13 RX ORDER — CIPROFLOXACIN 500 MG/1
500 TABLET, FILM COATED ORAL ONCE
Status: COMPLETED | OUTPATIENT
Start: 2023-10-13 | End: 2023-10-13

## 2023-10-13 RX ADMIN — CIPROFLOXACIN 500 MG: 500 TABLET, FILM COATED ORAL at 00:26

## 2023-10-13 NOTE — ED PROVIDER NOTES
to chart patient has been following with primary care provider for the last 1 week. Patient had blood work obtained which was unremarkable. CT was ordered of the chest abdomen and pelvis which is yet to be completed. Patient corresponded with the PCPs office earlier today for difficulty functioning and staying in bed throughout. At the time she was complaining not being able to keep any food down. She however states to me that she has no vomiting. She was instructed to follow-up in the emergency department. Work-up in the department with EKG as noted above, normal TSH, negative troponin, no lactic acidosis, no electrolyte abnormalities or renal function impairment, negative COVID test, leukocytosis likely of 14.0 without a left shift likely reactive with no anemia. Patient with no transaminitis. No pancreatitis appreciated. CT of the abdomen pelvis with a nonobstructing stone appreciated on the right side. No lymphadenopathy present. Patient with no liver lesions. Could not give contrast dye as patient has anaphylaxis with contrast.  Radiology over read with calcifications also appreciated on the right measuring 3 mm and clearly it is vascular calcification no additional stones. No hydronephrosis appreciated. CT head with no intracranial bleeds or masses noted. Radiology overread with no acute intracranial abnormalities. X-ray with no infiltrates or consolidations. Patient with no overt cardiomegaly. No effusions appreciated. Radiology overread with no acute findings. Patient received a liter of IV fluids and Zofran in the department. On reevaluation patient with improvement of symptoms. Patient also received a dose of ciprofloxacin for the UTI since he is allergic to penicillin. Discussed lab and imaging results with the patient. Discussed with the patient urine sample had been sent out for culture.   Discussed need to follow-up with primary care physician for culture results to ensure

## 2023-10-13 NOTE — ED NOTES
IV access attempted x2 with no success. Patient states increased symptoms. Seen by NP, lab work this week along with orders for outpt imaging. Received call on Thurs with increased CRP. Instructed to be seen in ER.       Bautista Sher RN  10/12/23 2041

## 2023-10-13 NOTE — DISCHARGE INSTRUCTIONS
Thank you for trusting us  with your care today. You may use tylenol or ibuprofen as needed for fever and pain. Schedule follow-up with primary care in 1-4 days. Return to the ER immediately with the development of any new, persistent, or worsening symptoms.     Read discharge paperwork     Drink plenty of fluids    Take medication as prescribed

## 2023-10-14 LAB
MICROORGANISM SPEC CULT: NORMAL
SPECIMEN DESCRIPTION: NORMAL

## 2023-10-16 ENCOUNTER — HOSPITAL ENCOUNTER (OUTPATIENT)
Dept: PHYSICAL THERAPY | Age: 58
Setting detail: THERAPIES SERIES
Discharge: HOME | End: 2023-10-16
Payer: COMMERCIAL

## 2023-10-16 PROCEDURE — 97113 AQUATIC THERAPY/EXERCISES: CPT

## 2023-10-16 NOTE — PROGRESS NOTES
Phone: [address]           Fax: [phone]                           Outpatient Physical Therapy                                                                            Daily Note    Patient: Yvrose Castaneda : 1965  CSN #: [id number]   Referring Physician: AIMEE Richardson*  Date: 10/16/2023    Diagnosis: L hip pain, M25.552; L4-5 disc bulge, M51.36  Treatment Diagnosis: Low back pain    PT Insurance Information: [place]  Total # of Visits Approved: 10 Per Physician Order  Total # of Visits to Date: 7  No Show: 4  Canceled Appointment: 2    10/20/23 Plan of Care/Recert Due    Pre-Treatment Pain:  8-9/10  Subjective: pt reports 8-910 L hip and LBP prior to treatment. Exercises:  Exercise 1: HEP: glut sets, PPT's, LTR, SKTC, standing sink therex  Exercise 2: aq: in offloaded enviornment to reduce compressive forces:  Exercise 3: aq: Shallow water walking fwd, bwd and lateral x 3 laps each--semi deep this date  Exercise 4: aq: Shallow sink therex x 15  Exercise 5: aq: PTB rows/extension x 15 each  Exercise 6: aq: Semi deep fwd step up and step down x 15  Exercise 7: aq: Seated bench jet massage to reduce muscle tension while performing piriformis stretch B LE 30\" x 3. Seated B LE nerve glide x 15 each. Exercise 8: aq: Step stretch HS 30\" x 3 B LE  Exercise 9: aq: Semi deep shoulder matrix, single dumbbell 90/90 and chest fly x 15 each  Exercise 10: aq: Deep well bike 2 minutes, relax 5 minutes    Assessment  Assessment: Lumbar ROM: flexion to toes and SB ~2.5 inches above patella on both sides with increased pain reported. MMT B LE grossly 4/5 besides L hip flexion 4-/5, core 4-/5. Patient reported a 30% improvement in overall function and pain since beginning therapy. Continued with aquatic therapy, able to return to previously tolerated exercise log with moderate fatigue reported. pt reported a decrease in pain to 8/10 following tx.  Will continue to

## 2023-10-17 ENCOUNTER — TELEPHONE (OUTPATIENT)
Dept: PRIMARY CARE CLINIC | Age: 58
End: 2023-10-17

## 2023-10-17 NOTE — TELEPHONE ENCOUNTER
[address] [facility],[address]St Floor Transitions Initial Follow Up Call    Outreach made within 2 business days of discharge: Yes    Patient: Jerome Diaz Patient : 1965   MRN: [id number]  Reason for Admission: There are no discharge diagnoses documented for the most recent discharge. Discharge Date: 10/13/23       Spoke with: Arabella Munguia     Discharge department/facility: [facility]     TCM Interactive Patient Contact:  Was patient able to fill all prescriptions:   Was patient instructed to bring all medications to the follow-up visit: Yes  Is patient taking all medications as directed in the discharge summary?  Yes  Does patient understand their discharge instructions: Yes  Does patient have questions or concerns that need addressed prior to 7-14 day follow up office visit: no    Scheduled appointment with PCP within 7-14 days    Follow Up  Future Appointments   Date Time Provider [address]   10/19/2023  1:15 PM Shaheen To, PT [facility] PT [place]   10/25/2023  3:15 PM [facility] CAT SCAN ROOM [facility] CT [facility] Rad   1/3/2024 10:20 AM AIMEE Cage - CNP [facility] [facility]       Jaye Tavarez MA

## 2023-10-18 ENCOUNTER — APPOINTMENT (OUTPATIENT)
Dept: PHYSICAL THERAPY | Age: 58
End: 2023-10-18
Payer: COMMERCIAL

## 2023-10-19 ENCOUNTER — HOSPITAL ENCOUNTER (OUTPATIENT)
Dept: PHYSICAL THERAPY | Age: 58
Setting detail: THERAPIES SERIES
Discharge: HOME | End: 2023-10-19
Payer: COMMERCIAL

## 2023-10-19 PROCEDURE — 97110 THERAPEUTIC EXERCISES: CPT

## 2023-10-19 ASSESSMENT — PAIN SCALES - QUEBEC BACK PAIN DISABILITY SCALE
TAKE FOOD OUT OF THE REFRIGERATOR: 0
CARRY TWO BAGS OF GROCERIES: 1
SLEEP THROUGH THE NIGHT: 5
WALK A FEW BLOCKS OR 300 TO 400M: 4
THROW A BALL: 2
TOTAL SCORE: 69
LIFT AND CARRY A HEAVY SUITCASE: 5
PUT ON SOCKS OR PANYHOSE: 3
WALK SEVERAL KILOMETERS  OR MILES: 5
REACH UP TO HIGH SHELVES: 2
TURN OVER IN BED: 5
RUN ONE BLOCK OR 100M: 5
CLIMB ONE FLIGHT OF STAIRS: 3
STAND UP FOR 20 TO 30 MINUTES: 5
SIT IN A CHAIR FOR SEVERAL HOURS: 3
BEND OVER TO CLEAN THE BATHTUB: 5
MOVE A CHAIR: 1
MAKE YOUR BED: 4
RIDE IN A CAR: 3
PULL OR PUSH HEAVY DOORS: 4
GET OUT OF BED: 4

## 2023-10-19 NOTE — PROGRESS NOTES
Phone: [phone]                 [facility]           Fax: [phone]                           Outpatient Physical Therapy                                                                            Daily Note    Patient: Trenton Ribeiro : 1965  [facility] #: [id number]   Referring Physician: AIMEE Winters*  Date: 10/19/2023    Diagnosis: L hip pain, M25.552; L4-5 disc bulge, M51.36  Treatment Diagnosis: Low back pain    PT Insurance Information: [place]  Total # of Visits Approved: 10 Per Physician Order  Total # of Visits to Date: 8  No Show: 4  Canceled Appointment: 2    23 Plan of Care/Recert Due    Pre-Treatment Pain:  8/10  Subjective: Pt arrives with reports that she has been really enjoying aquatic therapy. Pt states she usually arrives to treatment with 8-9/10 pain and leaves with pain significantly reduced. Pt reports pain does not stay away but she feels she is slowly improving overall. Pt reporting 25-30% improvement in symptoms. Exercises:  Exercise 11: 5x sit to stand  Exercise 12: AROM lumbar     Modality:     Modality Flow Sheet:   Performed (X) Tx Modality   X Hot Pack: 10 minutes - heat pack to low back for pain management            Assessment  Assessment: Pt is a 63year old female completing 8 sessions of skilled outpatient physical therapy for complaints of low back pain. Pt has been participating in aquatic therapy with good progress towards goals and relief of pain. Pt with increasing tolerance to activities during therapy sessions. Pt expressing desire to continue with therapy services. Therapist recommending continuing aquatic therapy interventions 2x per week for 6 weeks. Please see updated objective measures and goals below for current pt status.     Objective measures    5x sit to stand = 29.40 seconds    [place] = 69    Lumbar flexion AROM = fingers to 2.5 inches proximal to patella     Activity Tolerance  Activity Tolerance: Patient tolerated treatment

## 2023-10-19 NOTE — PLAN OF CARE
[facility]           Phone: [phone]             Outpatient Physical Therapy  Fax: [phone]                                           Date: 10/19/2023  Patient: Dior De Souza : 1965 CSN #: [id number]   Referring Physician: AIMEE Plummer*      [] Plan of Care   [x] Updated Plan of Care    Dates of Service to Include: 10/19/2023 to 23    Diagnosis:  L hip pain, M25.552; L4-5 disc bulge, M51.36    Rehab (Treatment) Diagnosis:  Low back pain               Attendance  Total # of Visits to Date: 8 No Show: 4 Canceled Appointment: 2    Assessment  Assessment: Pt is a 63year old female completing 8 sessions of skilled outpatient physical therapy for complaints of low back pain. Pt has been participating in aquatic therapy with good progress towards goals and relief of pain. Pt with increasing tolerance to activities during therapy sessions. Pt expressing desire to continue with therapy services. Therapist recommending continuing aquatic therapy interventions 2x per week for 6 weeks. Please see updated objective measures and goals below for current pt status. Objective measures     5x sit to stand = 29.40 seconds     [place] = 69     Lumbar flexion AROM = fingers to 2.5 inches proximal to patella     Goals  Short Term Goals  Time Frame for Short Term Goals: 3 weeks  Short Term Goal 1: Patient to initiate HEP for improved lumbar ROM and core strength. - met  Short Term Goal 2: Pt to be instructed in gentle core strengthening to improve lumbar stability--met  Short Term Goal 3: Patient to tolerate 45 min of aquatic exercise to offload the spine and decrease low back pain--met  Long Term Goals  Time Frame for Long Term Goals : 6 weeks  Long Term Goal 1: Patient to be independent and compliant with HEP and aquatic exercise.   Long Term Goal 2: Pt to have improved lumbar AROM flexion: 2in below knees

## 2023-10-23 ENCOUNTER — HOSPITAL ENCOUNTER (OUTPATIENT)
Dept: PHYSICAL THERAPY | Age: 58
Setting detail: THERAPIES SERIES
Discharge: HOME | End: 2023-10-23
Payer: COMMERCIAL

## 2023-10-23 NOTE — PROGRESS NOTES
[facility]  Inpatient/Observation/Outpatient Rehabilitation    Date: 10/23/2023  Patient Name: Andrez Sellers       [] Inpatient Acute/Observation       [x]  Outpatient  : 1965        [x] Pt cancelled due to:  [] No Reason Given   [] Sick/ill   [x] Other:  Can't make it in, no further reason stated. Therapist/Assistant will attempt to see this patient, at our earliest opportunity.        [place]  [address] Date: 10/23/2023

## 2023-10-24 ENCOUNTER — HOSPITAL ENCOUNTER (OUTPATIENT)
Dept: PHYSICAL THERAPY | Age: 58
Setting detail: THERAPIES SERIES
Discharge: HOME | End: 2023-10-24
Payer: COMMERCIAL

## 2023-10-24 NOTE — PROGRESS NOTES
[facility]  Inpatient/Observation/Outpatient Rehabilitation    Date: 10/24/2023  Patient Name: Vonnie Bautista       [] Inpatient Acute/Observation       [x]  Outpatient  : 1965     [x] Pt no showed for scheduled appointment    PTA attempted to contact patient by phone, no answer and then automated response said \"unavailable. \" No option to leave a voicemail. Therapist/Assistant will attempt to see this patient, at our earliest opportunity.        Reena Darling [id number] Date: 10/24/2023

## 2023-10-25 ENCOUNTER — TELEPHONE (OUTPATIENT)
Dept: PRIMARY CARE CLINIC | Age: 58
End: 2023-10-25

## 2023-10-25 NOTE — TELEPHONE ENCOUNTER
ECC contacted office in regards to patient needing appointment rescheduled, the line disconnected when attempting to reschedule through the [facility]. Writer attempted to contact patient on provided number and alternate provided number and both phone numbers did not go through.

## 2023-10-26 ENCOUNTER — HOSPITAL ENCOUNTER (EMERGENCY)
Age: 58
Discharge: HOME | End: 2023-10-26
Attending: EMERGENCY MEDICINE
Payer: COMMERCIAL

## 2023-10-26 ENCOUNTER — APPOINTMENT (OUTPATIENT)
Dept: PHYSICAL THERAPY | Age: 58
End: 2023-10-26
Payer: COMMERCIAL

## 2023-10-26 VITALS
RESPIRATION RATE: 20 BRPM | BODY MASS INDEX: 40.97 KG/M2 | WEIGHT: 240 LBS | SYSTOLIC BLOOD PRESSURE: 138 MMHG | HEART RATE: 78 BPM | DIASTOLIC BLOOD PRESSURE: 88 MMHG | TEMPERATURE: 97.9 F | OXYGEN SATURATION: 96 % | HEIGHT: 64 IN

## 2023-10-26 DIAGNOSIS — H60.391 INFECTIVE OTITIS EXTERNA OF RIGHT EAR: Primary | ICD-10-CM

## 2023-10-26 PROCEDURE — 6370000000 HC RX 637 (ALT 250 FOR IP): Performed by: EMERGENCY MEDICINE

## 2023-10-26 PROCEDURE — 99284 EMERGENCY DEPT VISIT MOD MDM: CPT

## 2023-10-26 PROCEDURE — 96372 THER/PROPH/DIAG INJ SC/IM: CPT

## 2023-10-26 PROCEDURE — 6360000002 HC RX W HCPCS: Performed by: EMERGENCY MEDICINE

## 2023-10-26 RX ORDER — CLINDAMYCIN HYDROCHLORIDE 150 MG/1
300 CAPSULE ORAL ONCE
Status: COMPLETED | OUTPATIENT
Start: 2023-10-26 | End: 2023-10-26

## 2023-10-26 RX ORDER — CLINDAMYCIN HYDROCHLORIDE 300 MG/1
300 CAPSULE ORAL 3 TIMES DAILY
Qty: 30 CAPSULE | Refills: 0 | Status: SHIPPED | OUTPATIENT
Start: 2023-10-26 | End: 2023-11-05

## 2023-10-26 RX ORDER — KETOROLAC TROMETHAMINE 30 MG/ML
INJECTION, SOLUTION INTRAMUSCULAR; INTRAVENOUS
Status: DISCONTINUED
Start: 2023-10-26 | End: 2023-10-27 | Stop reason: HOSPADM

## 2023-10-26 RX ORDER — NEOMYCIN SULFATE, POLYMYXIN B SULFATE, HYDROCORTISONE 3.5; 10000; 1 MG/ML; [USP'U]/ML; MG/ML
4 SOLUTION/ DROPS AURICULAR (OTIC) EVERY 4 HOURS
Qty: 15 ML | Refills: 0 | Status: SHIPPED | OUTPATIENT
Start: 2023-10-26 | End: 2023-11-08

## 2023-10-26 RX ORDER — KETOROLAC TROMETHAMINE 15 MG/ML
30 INJECTION, SOLUTION INTRAMUSCULAR; INTRAVENOUS ONCE
Status: COMPLETED | OUTPATIENT
Start: 2023-10-26 | End: 2023-10-26

## 2023-10-26 RX ORDER — KETOROLAC TROMETHAMINE 10 MG/1
10 TABLET, FILM COATED ORAL EVERY 8 HOURS PRN
Qty: 15 TABLET | Refills: 0 | Status: SHIPPED | OUTPATIENT
Start: 2023-10-26

## 2023-10-26 RX ORDER — NEOMYCIN SULFATE, POLYMYXIN B SULFATE AND HYDROCORTISONE 10; 3.5; 1 MG/ML; MG/ML; [USP'U]/ML
3 SUSPENSION/ DROPS AURICULAR (OTIC) EVERY 6 HOURS SCHEDULED
Status: DISCONTINUED | OUTPATIENT
Start: 2023-10-27 | End: 2023-10-27 | Stop reason: HOSPADM

## 2023-10-26 RX ADMIN — NEOMYCIN SULFATE, POLYMYXIN B SULFATE AND HYDROCORTISONE 3 DROP: 10; 3.5; 1 SUSPENSION/ DROPS AURICULAR (OTIC) at 22:51

## 2023-10-26 RX ADMIN — CLINDAMYCIN HYDROCHLORIDE 300 MG: 150 CAPSULE ORAL at 22:52

## 2023-10-26 RX ADMIN — KETOROLAC TROMETHAMINE 30 MG: 15 INJECTION, SOLUTION INTRAMUSCULAR; INTRAVENOUS at 22:53

## 2023-10-26 ASSESSMENT — PAIN DESCRIPTION - ORIENTATION
ORIENTATION: LEFT
ORIENTATION: RIGHT

## 2023-10-26 ASSESSMENT — PAIN DESCRIPTION - DESCRIPTORS: DESCRIPTORS: DISCOMFORT

## 2023-10-26 ASSESSMENT — PAIN DESCRIPTION - LOCATION
LOCATION: EAR
LOCATION: EAR

## 2023-10-26 ASSESSMENT — PAIN - FUNCTIONAL ASSESSMENT: PAIN_FUNCTIONAL_ASSESSMENT: 0-10

## 2023-10-26 ASSESSMENT — PAIN SCALES - GENERAL
PAINLEVEL_OUTOF10: 8
PAINLEVEL_OUTOF10: 10

## 2023-10-27 ENCOUNTER — TELEPHONE (OUTPATIENT)
Dept: PRIMARY CARE CLINIC | Age: 58
End: 2023-10-27

## 2023-10-27 NOTE — TELEPHONE ENCOUNTER
[address] [facility],1St Floor Transitions Initial Follow Up Call    Outreach made within 2 business days of discharge: Yes    Patient: Mary Miranda Patient : 1965   MRN: [id number]  Reason for Admission: There are no discharge diagnoses documented for the most recent discharge. Discharge Date: 10/26/23       Spoke with: sana for patient     Discharge department/facility: [facility]     TCM Interactive Patient Contact:  Was patient able to fill all prescriptions:   Was patient instructed to bring all medications to the follow-up visit:   Is patient taking all medications as directed in the discharge summary?    Does patient understand their discharge instructions:   Does patient have questions or concerns that need addressed prior to 7-14 day follow up office visit:     Scheduled appointment with PCP within 7-14 days    Follow Up  Future Appointments   Date Time Provider [address]   10/30/2023 12:45 PM Pamila Resides, PTA [facility] PT [place]   2023  1:30 PM Pamila Resides, PTA [facility] [place]   2023  1:30 PM Pamila Resides, PTA [facility] [place]   2023 12:45 PM Liang , Hermelinda Banco, PTA [facility] PT [place]   2023  1:30 PM Pamila Resides, PTA [facility] PT [place]   2023 12:45 PM Liang , Hermelinda Banco, PTA [facility] PT [place]   2023 12:45 PM Liang [place], Hermelinda Banco, PTA [facility] PT [place]   2023 12:00 PM Christy Olivier, PTA [facility] PT [place]   2023 12:45 PM Liang , Hermelinda Banco, PTA [facility] PT [place]   2023 12:45 PM Liang , Hermelinda Banco, PTA [facility] PT [place]   1/3/2024 10:20 AM Bart Rogers, AIMEE - CNP [facility]       Andrez Mcfarland MA

## 2023-10-30 ENCOUNTER — TELEPHONE (OUTPATIENT)
Dept: PRIMARY CARE CLINIC | Age: 58
End: 2023-10-30

## 2023-10-30 ENCOUNTER — HOSPITAL ENCOUNTER (OUTPATIENT)
Dept: PHYSICAL THERAPY | Age: 58
Setting detail: THERAPIES SERIES
Discharge: HOME | End: 2023-10-30
Payer: COMMERCIAL

## 2023-10-30 ENCOUNTER — OFFICE VISIT (OUTPATIENT)
Dept: PRIMARY CARE CLINIC | Age: 58
End: 2023-10-30
Payer: COMMERCIAL

## 2023-10-30 ENCOUNTER — HOSPITAL ENCOUNTER (OUTPATIENT)
Dept: CT IMAGING | Age: 58
Discharge: HOME | End: 2023-11-01
Payer: COMMERCIAL

## 2023-10-30 VITALS
WEIGHT: 243.6 LBS | RESPIRATION RATE: 16 BRPM | BODY MASS INDEX: 41.81 KG/M2 | TEMPERATURE: 98.4 F | DIASTOLIC BLOOD PRESSURE: 76 MMHG | SYSTOLIC BLOOD PRESSURE: 108 MMHG | HEART RATE: 69 BPM | OXYGEN SATURATION: 95 %

## 2023-10-30 DIAGNOSIS — M54.2 NECK PAIN: ICD-10-CM

## 2023-10-30 DIAGNOSIS — H60.501 ACUTE NON-INFECTIVE OTITIS EXTERNA OF RIGHT EAR, UNSPECIFIED TYPE: ICD-10-CM

## 2023-10-30 DIAGNOSIS — R22.1 NECK SWELLING: ICD-10-CM

## 2023-10-30 DIAGNOSIS — H60.501 ACUTE NON-INFECTIVE OTITIS EXTERNA OF RIGHT EAR, UNSPECIFIED TYPE: Primary | ICD-10-CM

## 2023-10-30 PROCEDURE — G8482 FLU IMMUNIZE ORDER/ADMIN: HCPCS | Performed by: NURSE PRACTITIONER

## 2023-10-30 PROCEDURE — 4130F TOPICAL PREP RX AOE: CPT | Performed by: NURSE PRACTITIONER

## 2023-10-30 PROCEDURE — 3078F DIAST BP <80 MM HG: CPT | Performed by: NURSE PRACTITIONER

## 2023-10-30 PROCEDURE — 3017F COLORECTAL CA SCREEN DOC REV: CPT | Performed by: NURSE PRACTITIONER

## 2023-10-30 PROCEDURE — 99214 OFFICE O/P EST MOD 30 MIN: CPT | Performed by: NURSE PRACTITIONER

## 2023-10-30 PROCEDURE — 3074F SYST BP LT 130 MM HG: CPT | Performed by: NURSE PRACTITIONER

## 2023-10-30 PROCEDURE — 70490 CT SOFT TISSUE NECK W/O DYE: CPT

## 2023-10-30 PROCEDURE — G8417 CALC BMI ABV UP PARAM F/U: HCPCS | Performed by: NURSE PRACTITIONER

## 2023-10-30 PROCEDURE — 1036F TOBACCO NON-USER: CPT | Performed by: NURSE PRACTITIONER

## 2023-10-30 PROCEDURE — G8427 DOCREV CUR MEDS BY ELIG CLIN: HCPCS | Performed by: NURSE PRACTITIONER

## 2023-10-30 RX ORDER — CIPROFLOXACIN 500 MG/1
500 TABLET, FILM COATED ORAL 2 TIMES DAILY
Qty: 14 TABLET | Refills: 0 | Status: SHIPPED | OUTPATIENT
Start: 2023-10-30 | End: 2023-11-06

## 2023-10-30 RX ORDER — CIPROFLOXACIN AND DEXAMETHASONE 3; 1 MG/ML; MG/ML
4 SUSPENSION/ DROPS AURICULAR (OTIC) 2 TIMES DAILY
Qty: 7.5 ML | Refills: 0 | Status: SHIPPED
Start: 2023-10-30 | End: 2023-10-31 | Stop reason: RX

## 2023-10-30 ASSESSMENT — PATIENT HEALTH QUESTIONNAIRE - PHQ9
2. FEELING DOWN, DEPRESSED OR HOPELESS: 0
SUM OF ALL RESPONSES TO PHQ QUESTIONS 1-9: 0
7. TROUBLE CONCENTRATING ON THINGS, SUCH AS READING THE NEWSPAPER OR WATCHING TELEVISION: 0
9. THOUGHTS THAT YOU WOULD BE BETTER OFF DEAD, OR OF HURTING YOURSELF: 0
SUM OF ALL RESPONSES TO PHQ QUESTIONS 1-9: 0
3. TROUBLE FALLING OR STAYING ASLEEP: 0
SUM OF ALL RESPONSES TO PHQ9 QUESTIONS 1 & 2: 0
8. MOVING OR SPEAKING SO SLOWLY THAT OTHER PEOPLE COULD HAVE NOTICED. OR THE OPPOSITE, BEING SO FIGETY OR RESTLESS THAT YOU HAVE BEEN MOVING AROUND A LOT MORE THAN USUAL: 0
1. LITTLE INTEREST OR PLEASURE IN DOING THINGS: 0
6. FEELING BAD ABOUT YOURSELF - OR THAT YOU ARE A FAILURE OR HAVE LET YOURSELF OR YOUR FAMILY DOWN: 0
10. IF YOU CHECKED OFF ANY PROBLEMS, HOW DIFFICULT HAVE THESE PROBLEMS MADE IT FOR YOU TO DO YOUR WORK, TAKE CARE OF THINGS AT HOME, OR GET ALONG WITH OTHER PEOPLE: 0
SUM OF ALL RESPONSES TO PHQ QUESTIONS 1-9: 0
5. POOR APPETITE OR OVEREATING: 0
4. FEELING TIRED OR HAVING LITTLE ENERGY: 0
SUM OF ALL RESPONSES TO PHQ QUESTIONS 1-9: 0

## 2023-10-30 ASSESSMENT — COLUMBIA-SUICIDE SEVERITY RATING SCALE - C-SSRS
3. HAVE YOU BEEN THINKING ABOUT HOW YOU MIGHT KILL YOURSELF?: NO
5. HAVE YOU STARTED TO WORK OUT OR WORKED OUT THE DETAILS OF HOW TO KILL YOURSELF? DO YOU INTEND TO CARRY OUT THIS PLAN?: NO
7. DID THIS OCCUR IN THE LAST THREE MONTHS: NO
4. HAVE YOU HAD THESE THOUGHTS AND HAD SOME INTENTION OF ACTING ON THEM?: NO

## 2023-10-30 ASSESSMENT — ENCOUNTER SYMPTOMS
GASTROINTESTINAL NEGATIVE: 1
EYES NEGATIVE: 1
ALLERGIC/IMMUNOLOGIC NEGATIVE: 1
RESPIRATORY NEGATIVE: 1

## 2023-10-30 NOTE — TELEPHONE ENCOUNTER
----- Message from AIMEE Schilling CNP sent at 10/30/2023  1:35 PM EDT -----  Please notify Riri Verma I have also sent a new drop for her to use in her ear with the new oral antibiotic.   Thanks

## 2023-10-30 NOTE — PROGRESS NOTES
[facility]  Inpatient/Observation/Outpatient Rehabilitation    Date: 10/30/2023  Patient Name: Sabiha Blankenship       [] Inpatient Acute/Observation       [x]  Outpatient  : 1965          [x] Pt cancelled due to:  [] No Reason Given   [x] Sick/ill   [] Other:    Therapist/Assistant will attempt to see this patient, at our earliest opportunity.        Reena Luis [id number] Date: 10/30/2023

## 2023-10-30 NOTE — PATIENT INSTRUCTIONS
SURVEY:     You may be receiving a survey from [facility] regarding your visit today. Please complete the survey to enable us to provide the highest quality of care to you and your family. If you cannot score us a very good on any question, please call the office to discuss how we could have made your experience a very good one.      Thank you,    Adeliade Schneider, APRN-CINDY Crespo, APRN-CNP  Mei Richey, LPN  Gray Hem, CMA  Giovanny Sicks, CMA  Linda, CMA  Leslie, PCA  Sonia, PM

## 2023-10-30 NOTE — TELEPHONE ENCOUNTER
----- Message from Chantelle Khan, AIMEE - CNP sent at 10/30/2023 12:36 PM EDT -----  Please notify patient of CT results. There is no abscess. Start new antibiotic and continue drops she got in ER. If no improvement in a couple days we will refer to ENT.   Thanks Gurdeep's

## 2023-10-30 NOTE — PROGRESS NOTES
[facility]  Vidhi Zhong, [address] PRIMARY CARE  [address] [facility] [address] [facility]  Dept: [phone]  Dept Fax: [phone]      Name: Jae Stoner  : 1965         Chief Complaint:     Chief Complaint   Patient presents with    Follow-up     Patient went to the ED for an ear infection in right ear. Patient was given antibiotics. Patient not feeling better. History of Present Illness:      Tanisha Ibarra is a 63 y.o.  female who presents with Follow-up (Patient went to the ED for an ear infection in right ear. Patient was given antibiotics. Patient not feeling better. )      DANIELLE Pérez is here today for an ED follow up. She was seen in the ER on 10/26/2023 and treated for an ear infection. She was diagnosed with an otitis externa of the right ear and was placed on ear drops. She was given PO Clindamycin and Cortisporin drops. She is having severe pain in the right ear. Denies drainage from the ear. No fever. She has had some right sided facial swelling. Past Medical History:     Past Medical History:   Diagnosis Date    Arthritis     Asthma     COPD (chronic obstructive pulmonary disease) ([address] [facility])     Eczema of face     Genital warts     Hypertension     Psychiatric problem     Restless legs     Sleep apnea     Stroke [facility])       Reviewed all health maintenance requirements and ordered appropriate tests  Health Maintenance Due   Topic Date Due    Lipids  Never done    Colorectal Cancer Screen  Never done       Past Surgical History:     Past Surgical History:   Procedure Laterality Date    APPENDECTOMY      BACK SURGERY       SECTION      CHOLECYSTECTOMY      KIDNEY SURGERY Right     LUMBAR FUSION      TONSILLECTOMY      TOTAL KNEE ARTHROPLASTY Left         Medications:       Prior to Admission medications    Medication Sig Start Date End Date Taking?  Authorizing Provider   ciprofloxacin (CIPRO) 500 MG tablet Take 1 tablet by mouth 2 times daily for

## 2023-10-31 ENCOUNTER — TELEPHONE (OUTPATIENT)
Dept: PRIMARY CARE CLINIC | Age: 58
End: 2023-10-31

## 2023-10-31 DIAGNOSIS — F41.9 ANXIETY: ICD-10-CM

## 2023-10-31 RX ORDER — LORAZEPAM 1 MG/1
TABLET ORAL
Qty: 90 TABLET | Refills: 0 | Status: SHIPPED | OUTPATIENT
Start: 2023-10-31 | End: 2023-11-30

## 2023-10-31 NOTE — TELEPHONE ENCOUNTER
Sarahy Jean-Baptiste called and said the ciprodex is on back order and cant be ordered so a new medication will need to be called in.   Please advise thank you

## 2023-10-31 NOTE — TELEPHONE ENCOUNTER
Health Maintenance   Topic Date Due    Lipids  Never done    Colorectal Cancer Screen  Never done    DTaP/Tdap/Td vaccine (1 - Tdap) 03/23/2024 (Originally 7/10/2019)    Shingles vaccine (2 of 2) 03/23/2024 (Originally 12/29/2020)    COVID-19 Vaccine (3 - Juarez Dandre series) 03/23/2024 (Originally 9/22/2021)    Hepatitis C screen  03/23/2024 (Originally 9/7/1984)    HIV screen  03/23/2024 (Originally 9/7/1981)    Pneumococcal 0-64 years Vaccine (2 - PPSV23 or PCV20) 04/27/2024 (Originally 12/29/2020)    Hepatitis B vaccine (1 of 3 - 3-dose series) 10/02/2024 (Originally 1965)    Depression Monitoring  10/30/2024    Breast cancer screen  05/10/2025    Cervical cancer screen  02/24/2027    Flu vaccine  Completed    Hepatitis A vaccine  Aged Out    Hib vaccine  Aged Out    Meningococcal (ACWY) vaccine  Aged Out    Diabetes screen  Discontinued             (applicable per patient's age: Cancer Screenings, Depression Screening, Fall Risk Screening, Immunizations)    Hemoglobin A1C (%)   Date Value   10/10/2023 5.1     AST (U/L)   Date Value   10/12/2023 13     ALT (U/L)   Date Value   10/12/2023 8     BUN (mg/dL)   Date Value   10/12/2023 10      (goal A1C is < 7)   (goal LDL is <100) need 30-50% reduction from baseline     BP Readings from Last 3 Encounters:   10/30/23 108/76   10/26/23 138/88   10/12/23 112/80    (goal /80)      All Future Testing planned in [facility]:  Lab Frequency Next Occurrence   MRI CERVICAL SPINE WO CONTRAST Once 06/26/2023   CT CHEST ABDOMEN PELVIS WO CONTRAST Additional Contrast? Radiologist Recommendation Once 10/10/2023       Next Visit Date:  Future Appointments   Date Time Provider [address]   11/2/2023  1:30 PM Ramond Care, PTA [facility] PT [place]   11/6/2023  1:30 PM Ramond Care, PTA [facility] PT [place]   11/9/2023  1:15 PM Ramond Care, PTA [facility] PT [place]   11/13/2023  1:30 PM RIGO [place] PT   11/16/2023 12:45 PM Apoorva Renae PTA [facility] PT [place]

## 2023-11-02 ENCOUNTER — HOSPITAL ENCOUNTER (OUTPATIENT)
Dept: PHYSICAL THERAPY | Age: 58
Setting detail: THERAPIES SERIES
Discharge: HOME | End: 2023-11-02

## 2023-11-06 ENCOUNTER — HOSPITAL ENCOUNTER (OUTPATIENT)
Dept: PHYSICAL THERAPY | Age: 58
Setting detail: THERAPIES SERIES
Discharge: HOME | End: 2023-11-06
Payer: COMMERCIAL

## 2023-11-06 ENCOUNTER — TELEPHONE (OUTPATIENT)
Dept: PRIMARY CARE CLINIC | Age: 58
End: 2023-11-06

## 2023-11-06 DIAGNOSIS — H60.501 ACUTE NON-INFECTIVE OTITIS EXTERNA OF RIGHT EAR, UNSPECIFIED TYPE: Primary | ICD-10-CM

## 2023-11-06 NOTE — TELEPHONE ENCOUNTER
Patient was seen 10/30/2023 for ED follow up otitis externa right ear. Patient states \"I can't hear out of the ear at all. \" Requesting referral or advise.

## 2023-11-06 NOTE — PROGRESS NOTES
[facility]  Inpatient/Observation/Outpatient Rehabilitation    Date: 2023  Patient Name: Rosalba Hess       [] Inpatient Acute/Observation       [x]  Outpatient  : 1965     [x] Pt no showed for scheduled appointment      Therapist/Assistant will attempt to see this patient, at our earliest opportunity.        Reena Mora [id number] Date: 2023

## 2023-11-09 ENCOUNTER — HOSPITAL ENCOUNTER (OUTPATIENT)
Dept: PHYSICAL THERAPY | Age: 58
Setting detail: THERAPIES SERIES
Discharge: HOME | End: 2023-11-09
Payer: COMMERCIAL

## 2023-11-09 PROCEDURE — 97113 AQUATIC THERAPY/EXERCISES: CPT

## 2023-11-09 NOTE — PROGRESS NOTES
Phone: [address] [facility]           Fax: [phone]                           Outpatient Physical Therapy                                                                            Daily Note    Patient: Wanda Alvarez : 1965  CSN #: [id number]   Referring Physician: AIMEE Shah*  Date: 2023    Diagnosis: L hip pain, M25.552; L4-5 disc bulge, M51.36  Treatment Diagnosis: Low back pain    PT Insurance Information: [employer]  Total # of Visits Approved: 10 Per Physician Order  Total # of Visits to Date: 9  No Show: 7  Canceled Appointment: 4    23 Plan of Care/Recert Due    Pre-Treatment Pain:  7-8/10  Subjective: pt reports she has been \"very sick with double ear infection and swimmers ear. \" pt reports she cannot hear out of her R ear. pt reports current L hip and LBP 7-8/10. Exercises:  Exercise 1: HEP: glut sets, PPT's, LTR, SKTC, standing sink therex  Exercise 2: aq: in offloaded enviornment to reduce compressive forces:  Exercise 3: aq: Shallow water walking fwd, bwd and lateral x 3 laps each  Exercise 4: aq: Shallow sink therex x 15  Exercise 5: aq: PTB rows/extension x 15 each  Exercise 6: aq: Semi deep fwd step up and step down x 15  Exercise 8: aq: Step stretch HS 30\" x 3 B LE  Exercise 9: aq: Semi deep shoulder matrix, single dumbbell 90/90 and chest fly x 15 each  Exercise 10: aq: Deep well bike 2 minutes, relax 5 minutes--just relax this date    Assessment  Assessment: Patient returned after 3.5 weeks d/t ear problems. Reimplemented much of exercise log this date with good tolerance. pt denied any adverse reactions following tx. Will continue to progress as tolerated.     Activity Tolerance  Activity Tolerance: Patient tolerated treatment well    Patient Education  Patient Education: Exercise progression technique and rationale  Pt verbalized/demonstrated good understanding:     [x] Yes         [] No, pt required further

## 2023-11-13 ENCOUNTER — HOSPITAL ENCOUNTER (OUTPATIENT)
Dept: PHYSICAL THERAPY | Age: 58
Setting detail: THERAPIES SERIES
Discharge: HOME | End: 2023-11-13
Payer: COMMERCIAL

## 2023-11-13 NOTE — PROGRESS NOTES
Physical Therapy  [facility]  Inpatient/Observation/Outpatient Rehabilitation    Date: 2023  Patient Name: Verona Choi       [] Inpatient Acute/Observation       []  Outpatient  : 1965     [] Pt no showed for scheduled appointment    [] Pt refused/declined therapy at this time due to:           [x] Pt cancelled due to:  [] No Reason Given   [] Sick/ill   [x] Other: In [place] today. Therapist/Assistant will attempt to see this patient, at our earliest opportunity.        Emi Board Date: 2023

## 2023-11-15 DIAGNOSIS — M25.552 LEFT HIP PAIN: ICD-10-CM

## 2023-11-16 ENCOUNTER — HOSPITAL ENCOUNTER (OUTPATIENT)
Dept: PHYSICAL THERAPY | Age: 58
Setting detail: THERAPIES SERIES
Discharge: HOME | End: 2023-11-16
Payer: COMMERCIAL

## 2023-11-16 RX ORDER — LIDOCAINE 50 MG/G
PATCH TOPICAL
Qty: 30 PATCH | Refills: 0 | Status: SHIPPED | OUTPATIENT
Start: 2023-11-16

## 2023-11-16 NOTE — PROGRESS NOTES
[facility]  Inpatient/Observation/Outpatient Rehabilitation    Date: 2023  Patient Name: Rosalba Hess       [] Inpatient Acute/Observation       [x]  Outpatient  : 1965     [x] Pt no showed for scheduled appointment      Therapist/Assistant will attempt to see this patient, at our earliest opportunity.        Reena Mora [id number] Date: 2023

## 2023-11-16 NOTE — TELEPHONE ENCOUNTER
Health Maintenance   Topic Date Due    Lipids  Never done    Colorectal Cancer Screen  Never done    COVID-19 Vaccine (3 - 2023-24 season) 09/01/2023    DTaP/Tdap/Td vaccine (1 - Tdap) 03/23/2024 (Originally 7/10/2019)    Shingles vaccine (2 of 2) 03/23/2024 (Originally 12/29/2020)    Hepatitis C screen  03/23/2024 (Originally 9/7/1984)    HIV screen  03/23/2024 (Originally 9/7/1981)    Pneumococcal 0-64 years Vaccine (2 - PPSV23 or PCV20) 04/27/2024 (Originally 12/29/2020)    Hepatitis B vaccine (1 of 3 - 3-dose series) 10/02/2024 (Originally 1965)    Depression Monitoring  10/30/2024    Breast cancer screen  05/10/2025    Cervical cancer screen  02/24/2027    Flu vaccine  Completed    Hepatitis A vaccine  Aged Out    Hib vaccine  Aged Out    Meningococcal (ACWY) vaccine  Aged Out    Diabetes screen  Discontinued             (applicable per patient's age: Cancer Screenings, Depression Screening, Fall Risk Screening, Immunizations)    Hemoglobin A1C (%)   Date Value   10/10/2023 5.1     AST (U/L)   Date Value   10/12/2023 13     ALT (U/L)   Date Value   10/12/2023 8     BUN (mg/dL)   Date Value   10/12/2023 10      (goal A1C is < 7)   (goal LDL is <100) need 30-50% reduction from baseline     BP Readings from Last 3 Encounters:   10/30/23 108/76   10/26/23 138/88   10/12/23 112/80    (goal /80)      All Future Testing planned in [facility]:  Lab Frequency Next Occurrence   MRI CERVICAL SPINE WO CONTRAST Once 06/26/2023   CT CHEST ABDOMEN PELVIS WO CONTRAST Additional Contrast? Radiologist Recommendation Once 10/10/2023       Next Visit Date:  Future Appointments   Date Time Provider [address]   11/16/2023 12:45 PM Barbara Zhong PTA [facility] [place]   11/20/2023 12:45 PM Refugio Pimentel PTA [facility] [place]   11/24/2023 12:00 PM Angel Orourke PTA [facility] [place]   11/27/2023 12:45 PM Refugio Pimentel PTA [facility] [place]   11/30/2023 12:45 PM Refugio Renae PTA [facility] [place]   1/3/2024 10:20 AM

## 2023-11-20 ENCOUNTER — HOSPITAL ENCOUNTER (OUTPATIENT)
Dept: PHYSICAL THERAPY | Age: 58
Setting detail: THERAPIES SERIES
Discharge: HOME | End: 2023-11-20
Payer: COMMERCIAL

## 2023-11-20 PROCEDURE — 97113 AQUATIC THERAPY/EXERCISES: CPT

## 2023-11-20 NOTE — PROGRESS NOTES
Phone: [facility]           Fax: [phone]                           Outpatient Physical Therapy                                                                            Daily Note    Patient: Nikki Lara : 1965  CSN #: [id number]   Referring Physician: AIMEE Ellsworth*  Date: 2023    Diagnosis: L hip pain, M25.552; L4-5 disc bulge, M51.36  Treatment Diagnosis: Low back pain    PT Insurance Information: [employer]  Total # of Visits Approved: 10 Per Physician Order  Total # of Visits to Date: 10  No Show: 8  Canceled Appointment: 5    23 Plan of Care/Recert Due    Pre-Treatment Pain:  9/10  Subjective: pt reports her ear issues continue, she is to have a hearing test soon. pt reports L hip and LBP 9/10 prior to treatment. pt reports she has been doing alot of hourswork the past couple of days. Exercises:  Exercise 1: HEP: glut sets, PPT's, LTR, SKTC, standing sink therex  Exercise 2: aq: in offloaded enviornment to reduce compressive forces:  Exercise 3: aq: Shallow water walking fwd, bwd and lateral x 3 laps each  Exercise 5: aq: PTB rows/extension x 15 each  Exercise 6: aq: Shallow deep fwd step up and step down x 15  Exercise 7: aq: Seated bench jet massage to reduce muscle tension while performing piriformis stretch B LE 30\" x 3. Seated B LE nerve glide x 15 each. Exercise 8: aq: Step stretch HS 30\" x 3 B LE  Exercise 11: 15x sit to stand with 5#  Exercise 13: aq: Shallow march and kick walk x 2 laps each. Assessment  Assessment: Progressed stair training to shallow end, added march and kick walk and added resisted sit to stands to routine this date. pt tolerated tx well with minimal rest breaks. However, pt requested to end tx early, stating she was worn out. Will continue to progress as tolerated.     Activity Tolerance  Activity Tolerance: Patient tolerated treatment well    Patient Education  Patient Education: Exercise

## 2023-11-21 ENCOUNTER — HOSPITAL ENCOUNTER (OUTPATIENT)
Dept: PHYSICAL THERAPY | Age: 58
Setting detail: THERAPIES SERIES
Discharge: HOME | End: 2023-11-21
Payer: COMMERCIAL

## 2023-11-21 PROCEDURE — 97113 AQUATIC THERAPY/EXERCISES: CPT

## 2023-11-21 NOTE — PROGRESS NOTES
Phone: [phone] [facility]           Fax: [phone]                           Outpatient Physical Therapy                                                                            Daily Note    Patient: Colton Monteiro : 1965  CSN #: [id number]   Referring Physician: AIMEE Dahl*  Date: 2023    Diagnosis: L hip pain, M25.552; L4-5 disc bulge, M51.36  Treatment Diagnosis: Low back pain    PT Insurance Information: [employer]  Total # of Visits Approved: 10 Per Physician Order  Total # of Visits to Date: 11  No Show: 8  Canceled Appointment: 5    23 Plan of Care/Recert Due    Pre-Treatment Pain:  8/10  Subjective: pt 11 minutes late to appointment. pt reports feeling a little better since yesterday's treatment, but pain in L hip and LBP remain 8/10. Exercises:  Exercise 1: HEP: glut sets, PPT's, LTR, SKTC, standing sink therex  Exercise 2: aq: in offloaded enviornment to reduce compressive forces:  Exercise 3: aq: Shallow water walking fwd, bwd and lateral x 3 laps each  Exercise 4: aq: Shallow sink therex x 15  Exercise 6: aq: Shallow deep fwd step up and step down x 15  Exercise 7: aq: Seated bench jet massage to reduce muscle tension while performing piriformis stretch B LE 30\" x 3. Seated B LE nerve glide x 15 each. Exercise 8: aq: Step stretch HS 30\" x 3 B LE  Exercise 10: aq: Deep well bike 2 minutes, relax 5 minutes--just relax this date  Exercise 11: 15x sit to stand with 5#  Exercise 13: aq: Shallow march and kick walk x 2 laps each. Assessment  Assessment: Continued to progress aquatic routine this date with increased repetitions. pt tolerated tx well with minimal rest breaks. Will continue to progress as tolerated.     Activity Tolerance  Activity Tolerance: Patient tolerated treatment well    Patient Education  Patient Education: Exercise progression technique and rationale  Pt verbalized/demonstrated good understanding:     [x] Yes

## 2023-11-24 ENCOUNTER — APPOINTMENT (OUTPATIENT)
Dept: PHYSICAL THERAPY | Age: 58
End: 2023-11-24
Payer: COMMERCIAL

## 2023-11-27 ENCOUNTER — HOSPITAL ENCOUNTER (OUTPATIENT)
Dept: PHYSICAL THERAPY | Age: 58
Setting detail: THERAPIES SERIES
Discharge: HOME | End: 2023-11-27
Payer: COMMERCIAL

## 2023-11-27 NOTE — PROGRESS NOTES
[facility]  Inpatient/Observation/Outpatient Rehabilitation    Date: 2023  Patient Name: Kathleen Turk       [] Inpatient Acute/Observation       [x]  Outpatient  : 1965     [x] Pt no showed for scheduled appointment    Therapist/Assistant will attempt to see this patient, at our earliest opportunity.        Reena Mccarthy [id number] Date: 2023

## 2023-11-29 ENCOUNTER — TELEPHONE (OUTPATIENT)
Dept: PRIMARY CARE CLINIC | Age: 58
End: 2023-11-29

## 2023-11-29 DIAGNOSIS — F41.9 ANXIETY: ICD-10-CM

## 2023-11-29 NOTE — TELEPHONE ENCOUNTER
----- Message from Dylan Alcantara sent at 11/29/2023  4:32 PM EST -----  Subject: Medication Problem    Medication: pimecrolimus (ELIDEL) 1 % cream  Dosage: NA  Ordering Provider: undefined    Question/Problem: Pt states that insurance will not cover this medication.          Pharmacy: [facility] [id number] - [place] [phone]    ---------------------------------------------------------------------------  --------------  Lorie Chapman INFO  [phone]; OK to leave message on voicemail  ---------------------------------------------------------------------------  --------------    SCRIPT ANSWERS  Relationship to Patient: Self

## 2023-11-29 NOTE — TELEPHONE ENCOUNTER
----- Message from Dylan Quinton sent at 11/29/2023  4:29 PM EST -----  Subject: Refill Request    QUESTIONS  Name of Medication? LORazepam (ATIVAN) 1 MG tablet  Patient-reported dosage and instructions? twice daily   How many days do you have left? 0  Preferred Pharmacy? [facility] [id number]  Pharmacy phone number (if available)? [phone]  ---------------------------------------------------------------------------  --------------  CALL BACK INFO  What is the best way for the office to contact you? OK to leave message on   voicemail  Preferred Call Back Phone Number? [phone]  ---------------------------------------------------------------------------  --------------  SCRIPT ANSWERS  Relationship to Patient?  Self

## 2023-11-29 NOTE — TELEPHONE ENCOUNTER
Health Maintenance   Topic Date Due    Lipids  Never done    Colorectal Cancer Screen  Never done    COVID-19 Vaccine (3 - 2023-24 season) 09/01/2023    DTaP/Tdap/Td vaccine (1 - Tdap) 03/23/2024 (Originally 7/10/2019)    Shingles vaccine (2 of 2) 03/23/2024 (Originally 12/29/2020)    Hepatitis C screen  03/23/2024 (Originally 9/7/1984)    HIV screen  03/23/2024 (Originally 9/7/1981)    Pneumococcal 0-64 years Vaccine (2 - PPSV23 or PCV20) 04/27/2024 (Originally 12/29/2020)    Hepatitis B vaccine (1 of 3 - 3-dose series) 10/02/2024 (Originally 1965)    Depression Monitoring  10/30/2024    Breast cancer screen  05/10/2025    Cervical cancer screen  02/24/2027    Flu vaccine  Completed    Hepatitis A vaccine  Aged Out    Hib vaccine  Aged Out    Meningococcal (ACWY) vaccine  Aged Out    Diabetes screen  Discontinued             (applicable per patient's age: Cancer Screenings, Depression Screening, Fall Risk Screening, Immunizations)    Hemoglobin A1C (%)   Date Value   10/10/2023 5.1     AST (U/L)   Date Value   10/12/2023 13     ALT (U/L)   Date Value   10/12/2023 8     BUN (mg/dL)   Date Value   10/12/2023 10      (goal A1C is < 7)   (goal LDL is <100) need 30-50% reduction from baseline     BP Readings from Last 3 Encounters:   10/30/23 108/76   10/26/23 138/88   10/12/23 112/80    (goal /80)      All Future Testing planned in [facility]:  Lab Frequency Next Occurrence   MRI CERVICAL SPINE WO CONTRAST Once 06/26/2023   CT CHEST ABDOMEN PELVIS WO CONTRAST Additional Contrast? Radiologist Recommendation Once 10/10/2023       Next Visit Date:  Future Appointments   Date Time Provider [address]   11/30/2023 12:45 PM Prakash Arita PTA [facility] [place]   1/3/2024 10:20 AM Dena Solano, APRN - CNP [facility]            Patient Active Problem List:     Bipolar 1 disorder ([address])     Chest pain in adult     Hypertension

## 2023-11-30 ENCOUNTER — HOSPITAL ENCOUNTER (OUTPATIENT)
Dept: PHYSICAL THERAPY | Age: 58
Setting detail: THERAPIES SERIES
Discharge: HOME | End: 2023-11-30
Payer: COMMERCIAL

## 2023-11-30 PROCEDURE — 97113 AQUATIC THERAPY/EXERCISES: CPT

## 2023-11-30 RX ORDER — LORAZEPAM 1 MG/1
TABLET ORAL
Qty: 90 TABLET | Refills: 0 | Status: SHIPPED | OUTPATIENT
Start: 2023-11-30 | End: 2023-12-29

## 2023-11-30 RX ORDER — PANTOPRAZOLE SODIUM 40 MG/1
40 TABLET, DELAYED RELEASE ORAL DAILY
Qty: 90 TABLET | Refills: 1 | Status: SHIPPED | OUTPATIENT
Start: 2023-11-30

## 2023-11-30 NOTE — TELEPHONE ENCOUNTER
Health Maintenance   Topic Date Due    Lipids  Never done    Colorectal Cancer Screen  Never done    COVID-19 Vaccine (3 - 2023-24 season) 09/01/2023    DTaP/Tdap/Td vaccine (1 - Tdap) 03/23/2024 (Originally 7/10/2019)    Shingles vaccine (2 of 2) 03/23/2024 (Originally 12/29/2020)    Hepatitis C screen  03/23/2024 (Originally 9/7/1984)    HIV screen  03/23/2024 (Originally 9/7/1981)    Pneumococcal 0-64 years Vaccine (2 - PPSV23 or PCV20) 04/27/2024 (Originally 12/29/2020)    Hepatitis B vaccine (1 of 3 - 3-dose series) 10/02/2024 (Originally 1965)    Depression Monitoring  10/30/2024    Breast cancer screen  05/10/2025    Cervical cancer screen  02/24/2027    Flu vaccine  Completed    Hepatitis A vaccine  Aged Out    Hib vaccine  Aged Out    Meningococcal (ACWY) vaccine  Aged Out    Diabetes screen  Discontinued             (applicable per patient's age: Cancer Screenings, Depression Screening, Fall Risk Screening, Immunizations)    Hemoglobin A1C (%)   Date Value   10/10/2023 5.1     AST (U/L)   Date Value   10/12/2023 13     ALT (U/L)   Date Value   10/12/2023 8     BUN (mg/dL)   Date Value   10/12/2023 10      (goal A1C is < 7)   (goal LDL is <100) need 30-50% reduction from baseline     BP Readings from Last 3 Encounters:   10/30/23 108/76   10/26/23 138/88   10/12/23 112/80    (goal /80)      All Future Testing planned in [facility]:  Lab Frequency Next Occurrence   MRI CERVICAL SPINE WO CONTRAST Once 06/26/2023   CT CHEST ABDOMEN PELVIS WO CONTRAST Additional Contrast? Radiologist Recommendation Once 10/10/2023       Next Visit Date:  Future Appointments   Date Time Provider [address]   11/30/2023 12:45 PM Vamshi Miller PTA [facility] [place]   1/3/2024 10:20 AM Clara Vogel, Emily De La Cruz, APRN - CNP [facility]            Patient Active Problem List:     Bipolar 1 disorder ([address])     Chest pain in adult     Hypertension

## 2023-11-30 NOTE — PROGRESS NOTES
7/10      Plan  Plan Frequency: 2  Plan weeks: 6       Goals  (Total # of Visits to Date: 15)      Short Term Goals  Time Frame for Short Term Goals: 3 weeks  Short Term Goal 1: Patient to initiate HEP for improved lumbar ROM and core strength. - met  Short Term Goal 2: Pt to be instructed in gentle core strengthening to improve lumbar stability--met  Short Term Goal 3: Patient to tolerate 45 min of aquatic exercise to offload the spine and decrease low back pain--met    Long Term Goals  Time Frame for Long Term Goals : 6 weeks  Long Term Goal 1: Patient to be independent and compliant with HEP and aquatic exercise--met  Long Term Goal 2: Pt to have improved lumbar AROM flexion: 2in below knees and B SB: to knees with no increase in pain for improved mobility--met  Long Term Goal 3: Patient to have improved core and B hip strength >/=4/5 grossly all planes for improved lumbar stability. - met  Long Term Goal 4: Patient to report >/=50% improvement in symptoms to improve QOL.  - --met    Minutes Tracking:  Time In: 6903  Time Out: 1320  Minutes: 35  Timed Code Treatment Minutes: [address], [address]     Date: 11/30/2023

## 2023-12-06 ENCOUNTER — OFFICE VISIT (OUTPATIENT)
Dept: PRIMARY CARE CLINIC | Age: 58
End: 2023-12-06
Payer: COMMERCIAL

## 2023-12-06 VITALS
WEIGHT: 234.4 LBS | OXYGEN SATURATION: 97 % | SYSTOLIC BLOOD PRESSURE: 110 MMHG | DIASTOLIC BLOOD PRESSURE: 76 MMHG | HEART RATE: 80 BPM | TEMPERATURE: 99.4 F | RESPIRATION RATE: 16 BRPM | BODY MASS INDEX: 40.23 KG/M2

## 2023-12-06 DIAGNOSIS — R10.9 ABDOMINAL PRESSURE: ICD-10-CM

## 2023-12-06 DIAGNOSIS — R53.83 FATIGUE, UNSPECIFIED TYPE: Primary | ICD-10-CM

## 2023-12-06 LAB
BILIRUBIN, POC: NORMAL
BLOOD URINE, POC: NORMAL
CLARITY, POC: NORMAL
COLOR, POC: YELLOW
GLUCOSE URINE, POC: NORMAL
KETONES, POC: NORMAL
LEUKOCYTE EST, POC: NORMAL
NITRITE, POC: NORMAL
PH, POC: 6
PROTEIN, POC: NORMAL
SPECIFIC GRAVITY, POC: 1.03
UROBILINOGEN, POC: 0.2

## 2023-12-06 PROCEDURE — 3017F COLORECTAL CA SCREEN DOC REV: CPT | Performed by: NURSE PRACTITIONER

## 2023-12-06 PROCEDURE — G8427 DOCREV CUR MEDS BY ELIG CLIN: HCPCS | Performed by: NURSE PRACTITIONER

## 2023-12-06 PROCEDURE — 3078F DIAST BP <80 MM HG: CPT | Performed by: NURSE PRACTITIONER

## 2023-12-06 PROCEDURE — G8482 FLU IMMUNIZE ORDER/ADMIN: HCPCS | Performed by: NURSE PRACTITIONER

## 2023-12-06 PROCEDURE — 81003 URINALYSIS AUTO W/O SCOPE: CPT | Performed by: NURSE PRACTITIONER

## 2023-12-06 PROCEDURE — 3074F SYST BP LT 130 MM HG: CPT | Performed by: NURSE PRACTITIONER

## 2023-12-06 PROCEDURE — G8417 CALC BMI ABV UP PARAM F/U: HCPCS | Performed by: NURSE PRACTITIONER

## 2023-12-06 PROCEDURE — 1036F TOBACCO NON-USER: CPT | Performed by: NURSE PRACTITIONER

## 2023-12-06 PROCEDURE — 99214 OFFICE O/P EST MOD 30 MIN: CPT | Performed by: NURSE PRACTITIONER

## 2023-12-06 ASSESSMENT — PATIENT HEALTH QUESTIONNAIRE - PHQ9
SUM OF ALL RESPONSES TO PHQ QUESTIONS 1-9: 0
9. THOUGHTS THAT YOU WOULD BE BETTER OFF DEAD, OR OF HURTING YOURSELF: 0
4. FEELING TIRED OR HAVING LITTLE ENERGY: 0
3. TROUBLE FALLING OR STAYING ASLEEP: 0
10. IF YOU CHECKED OFF ANY PROBLEMS, HOW DIFFICULT HAVE THESE PROBLEMS MADE IT FOR YOU TO DO YOUR WORK, TAKE CARE OF THINGS AT HOME, OR GET ALONG WITH OTHER PEOPLE: 0
SUM OF ALL RESPONSES TO PHQ QUESTIONS 1-9: 0
SUM OF ALL RESPONSES TO PHQ QUESTIONS 1-9: 0
2. FEELING DOWN, DEPRESSED OR HOPELESS: 0
SUM OF ALL RESPONSES TO PHQ QUESTIONS 1-9: 0
5. POOR APPETITE OR OVEREATING: 0
8. MOVING OR SPEAKING SO SLOWLY THAT OTHER PEOPLE COULD HAVE NOTICED. OR THE OPPOSITE, BEING SO FIGETY OR RESTLESS THAT YOU HAVE BEEN MOVING AROUND A LOT MORE THAN USUAL: 0
1. LITTLE INTEREST OR PLEASURE IN DOING THINGS: 0
7. TROUBLE CONCENTRATING ON THINGS, SUCH AS READING THE NEWSPAPER OR WATCHING TELEVISION: 0
6. FEELING BAD ABOUT YOURSELF - OR THAT YOU ARE A FAILURE OR HAVE LET YOURSELF OR YOUR FAMILY DOWN: 0
SUM OF ALL RESPONSES TO PHQ9 QUESTIONS 1 & 2: 0

## 2023-12-06 ASSESSMENT — ENCOUNTER SYMPTOMS
ALLERGIC/IMMUNOLOGIC NEGATIVE: 1
DIARRHEA: 1
RESPIRATORY NEGATIVE: 1
CONSTIPATION: 1
EYES NEGATIVE: 1

## 2023-12-06 ASSESSMENT — COLUMBIA-SUICIDE SEVERITY RATING SCALE - C-SSRS
4. HAVE YOU HAD THESE THOUGHTS AND HAD SOME INTENTION OF ACTING ON THEM?: NO
7. DID THIS OCCUR IN THE LAST THREE MONTHS: NO
5. HAVE YOU STARTED TO WORK OUT OR WORKED OUT THE DETAILS OF HOW TO KILL YOURSELF? DO YOU INTEND TO CARRY OUT THIS PLAN?: NO
3. HAVE YOU BEEN THINKING ABOUT HOW YOU MIGHT KILL YOURSELF?: NO

## 2023-12-06 NOTE — PATIENT INSTRUCTIONS
SURVEY:     You may be receiving a survey from [employer] regarding your visit today. Please complete the survey to enable us to provide the highest quality of care to you and your family. If you cannot score us a very good on any question, please call the office to discuss how we could have made your experience a very good one.      Thank you,    Jamila Schneider, APRN-CNP  Ceola Barthel, APRN-CNP  Jai Santiago, LAURA Roper, CMA  Pebbles Carrillo, CMA  Linda, CMA  Leslie, PCA  Sonia, PM

## 2023-12-06 NOTE — PROGRESS NOTES
[facility]  Jasen Curiel, [address] PRIMARY CARE  [address] [facility] [address] [facility]  Dept: [phone]  Dept Fax: [phone]      Name: Andrez Sellers  : 1965         Chief Complaint:     Chief Complaint   Patient presents with    Vaginal Bleeding     Patient noticed this morning she went to bathroom and noticed blood on her underwear. Patient has been extremely tired and been taking more naps recently. Patient also has noticed abdominal pain and pressure. History of Present Illness:      Tnaisha Wallace is a 63 y.o.  female who presents with Vaginal Bleeding (Patient noticed this morning she went to bathroom and noticed blood on her underwear. Patient has been extremely tired and been taking more naps recently. Patient also has noticed abdominal pain and pressure. )      DANIELLE Garay is here today for complaints of increased fatigue and not feeling well. She has fallen asleep without her medication and taken a nap which she has not done before. She states the fatigue has been going on for 5-6 days. She had blood in her underwear today that was not normal for her. She states it was not bright red blood. She does have a history of hemmorhoids. She states that she has lost 9 pounds since she was here last.  She has been constipated recently. She has taken Senna for the constipation.       Past Medical History:     Past Medical History:   Diagnosis Date    Arthritis     Asthma     COPD (chronic obstructive pulmonary disease) ([address] [facility])     Eczema of face     Genital warts     Hypertension     Psychiatric problem     Restless legs     Sleep apnea     Stroke [facility])       Reviewed all health maintenance requirements and ordered appropriate tests  Health Maintenance Due   Topic Date Due    Lipids  Never done    Colorectal Cancer Screen  Never done    COVID-19 Vaccine (3 - 2023-24 season) 2023       Past Surgical History:     Past Surgical History:   Procedure Laterality

## 2023-12-07 ENCOUNTER — HOSPITAL ENCOUNTER (OUTPATIENT)
Age: 58
Discharge: HOME | End: 2023-12-07
Payer: COMMERCIAL

## 2023-12-07 ENCOUNTER — HOSPITAL ENCOUNTER (OUTPATIENT)
Age: 58
Setting detail: SPECIMEN
Discharge: HOME | End: 2023-12-07
Payer: COMMERCIAL

## 2023-12-07 DIAGNOSIS — R53.83 FATIGUE, UNSPECIFIED TYPE: ICD-10-CM

## 2023-12-07 LAB
25(OH)D3 SERPL-MCNC: 30 NG/ML
BASOPHILS # BLD: 0.13 K/UL (ref 0–0.2)
BASOPHILS NFR BLD: 1 % (ref 0–2)
EOSINOPHIL # BLD: 0.33 K/UL (ref 0–0.44)
EOSINOPHILS RELATIVE PERCENT: 3 % (ref 1–4)
ERYTHROCYTE [DISTWIDTH] IN BLOOD BY AUTOMATED COUNT: 14.2 % (ref 11.8–14.4)
FOLATE SERPL-MCNC: 4.7 NG/ML
HCT VFR BLD AUTO: 46.9 % (ref 36.3–47.1)
HGB BLD-MCNC: 14.5 G/DL (ref 11.9–15.1)
IMM GRANULOCYTES # BLD AUTO: 0.06 K/UL (ref 0–0.3)
IMM GRANULOCYTES NFR BLD: 1 %
LYMPHOCYTES NFR BLD: 3.25 K/UL (ref 1.1–3.7)
LYMPHOCYTES RELATIVE PERCENT: 25 % (ref 24–43)
MCH RBC QN AUTO: 28.8 PG (ref 25.2–33.5)
MCHC RBC AUTO-ENTMCNC: 30.9 G/DL (ref 28.4–34.8)
MCV RBC AUTO: 93.2 FL (ref 82.6–102.9)
MONOCYTES NFR BLD: 0.79 K/UL (ref 0.1–1.2)
MONOCYTES NFR BLD: 6 % (ref 3–12)
NEUTROPHILS NFR BLD: 64 % (ref 36–65)
NEUTS SEG NFR BLD: 8.33 K/UL (ref 1.5–8.1)
NRBC BLD-RTO: 0 PER 100 WBC
PLATELET # BLD AUTO: 310 K/UL (ref 138–453)
PMV BLD AUTO: 10.3 FL (ref 8.1–13.5)
RBC # BLD AUTO: 5.03 M/UL (ref 3.95–5.11)
VIT B12 SERPL-MCNC: 902 PG/ML (ref 232–1245)
WBC OTHER # BLD: 12.9 K/UL (ref 3.5–11.3)

## 2023-12-07 PROCEDURE — 82607 VITAMIN B-12: CPT

## 2023-12-07 PROCEDURE — 36415 COLL VENOUS BLD VENIPUNCTURE: CPT

## 2023-12-07 PROCEDURE — 82306 VITAMIN D 25 HYDROXY: CPT

## 2023-12-07 PROCEDURE — 85025 COMPLETE CBC W/AUTO DIFF WBC: CPT

## 2023-12-07 PROCEDURE — 83550 IRON BINDING TEST: CPT

## 2023-12-07 PROCEDURE — 83540 ASSAY OF IRON: CPT

## 2023-12-07 PROCEDURE — 82746 ASSAY OF FOLIC ACID SERUM: CPT

## 2023-12-07 PROCEDURE — 82728 ASSAY OF FERRITIN: CPT

## 2023-12-08 LAB
IRON SATN MFR SERPL: 11 % (ref 20–55)
IRON SERPL-MCNC: 43 UG/DL (ref 37–145)
TIBC SERPL-MCNC: 375 UG/DL (ref 250–450)
UNSATURATED IRON BINDING CAPACITY: 332 UG/DL (ref 112–347)

## 2023-12-11 DIAGNOSIS — D52.9 ANEMIA DUE TO FOLIC ACID DEFICIENCY, UNSPECIFIED DEFICIENCY TYPE: ICD-10-CM

## 2023-12-11 DIAGNOSIS — D50.9 IRON DEFICIENCY ANEMIA, UNSPECIFIED IRON DEFICIENCY ANEMIA TYPE: ICD-10-CM

## 2023-12-11 DIAGNOSIS — D52.9 ANEMIA DUE TO FOLIC ACID DEFICIENCY, UNSPECIFIED DEFICIENCY TYPE: Primary | ICD-10-CM

## 2023-12-11 RX ORDER — FERROUS SULFATE 325(65) MG
325 TABLET ORAL
Qty: 30 TABLET | Refills: 5 | Status: SHIPPED | OUTPATIENT
Start: 2023-12-11

## 2023-12-11 RX ORDER — FOLIC ACID 1 MG/1
1 TABLET ORAL DAILY
Qty: 90 TABLET | Refills: 1 | Status: SHIPPED | OUTPATIENT
Start: 2023-12-11

## 2023-12-12 ENCOUNTER — TELEPHONE (OUTPATIENT)
Dept: PRIMARY CARE CLINIC | Age: 58
End: 2023-12-12

## 2023-12-12 NOTE — TELEPHONE ENCOUNTER
----- Message from AIMEE Raymundo CNP sent at 12/11/2023 11:49 AM EST -----  Please notify patient of  lab results. She has a low iron level and low folate level. I have sent an iron supplement and folic acid supplement to the pharmacy. She needs to take iron supplement with food and educate it may cause constipation and she can use stool softeners if needed. Increase dietary iron fortified foods and folic acid fortified foods. We will repeat labs in 6 months.   Thanks Merritt Velasco

## 2023-12-14 ENCOUNTER — TELEPHONE (OUTPATIENT)
Dept: PRIMARY CARE CLINIC | Age: 58
End: 2023-12-14

## 2023-12-14 LAB — FERRITIN SERPL-MCNC: 51 NG/ML (ref 13–150)

## 2023-12-14 NOTE — TELEPHONE ENCOUNTER
----- Message from AIMEE Priest CNP sent at 12/14/2023 10:28 AM EST -----  Please notify patient of stable lab results.   Thanks Marcy

## 2023-12-27 ENCOUNTER — TELEPHONE (OUTPATIENT)
Dept: PRIMARY CARE CLINIC | Age: 58
End: 2023-12-27

## 2023-12-27 NOTE — TELEPHONE ENCOUNTER
Called pt about cologuard, she voiced she never received it. I let her know we can call [employer] for a new one to be sent out.

## 2024-01-03 ENCOUNTER — OFFICE VISIT (OUTPATIENT)
Dept: PRIMARY CARE CLINIC | Age: 59
End: 2024-01-03
Payer: COMMERCIAL

## 2024-01-03 VITALS
DIASTOLIC BLOOD PRESSURE: 76 MMHG | WEIGHT: 235.4 LBS | HEART RATE: 73 BPM | RESPIRATION RATE: 16 BRPM | BODY MASS INDEX: 40.41 KG/M2 | TEMPERATURE: 98.2 F | SYSTOLIC BLOOD PRESSURE: 102 MMHG | OXYGEN SATURATION: 98 %

## 2024-01-03 DIAGNOSIS — F41.9 ANXIETY: Primary | ICD-10-CM

## 2024-01-03 DIAGNOSIS — I10 PRIMARY HYPERTENSION: ICD-10-CM

## 2024-01-03 DIAGNOSIS — F41.9 ANXIETY: ICD-10-CM

## 2024-01-03 PROCEDURE — G8482 FLU IMMUNIZE ORDER/ADMIN: HCPCS | Performed by: NURSE PRACTITIONER

## 2024-01-03 PROCEDURE — G8417 CALC BMI ABV UP PARAM F/U: HCPCS | Performed by: NURSE PRACTITIONER

## 2024-01-03 PROCEDURE — 1036F TOBACCO NON-USER: CPT | Performed by: NURSE PRACTITIONER

## 2024-01-03 PROCEDURE — 3078F DIAST BP <80 MM HG: CPT | Performed by: NURSE PRACTITIONER

## 2024-01-03 PROCEDURE — 3074F SYST BP LT 130 MM HG: CPT | Performed by: NURSE PRACTITIONER

## 2024-01-03 PROCEDURE — 99214 OFFICE O/P EST MOD 30 MIN: CPT | Performed by: NURSE PRACTITIONER

## 2024-01-03 PROCEDURE — G8427 DOCREV CUR MEDS BY ELIG CLIN: HCPCS | Performed by: NURSE PRACTITIONER

## 2024-01-03 PROCEDURE — 3017F COLORECTAL CA SCREEN DOC REV: CPT | Performed by: NURSE PRACTITIONER

## 2024-01-03 RX ORDER — SENNOSIDES A AND B 8.6 MG/1
1 TABLET, FILM COATED ORAL EVERY OTHER DAY
Qty: 60 TABLET | Refills: 0 | Status: SHIPPED | OUTPATIENT
Start: 2024-01-03

## 2024-01-03 RX ORDER — LORAZEPAM 1 MG/1
TABLET ORAL
Qty: 90 TABLET | Refills: 0 | OUTPATIENT
Start: 2024-01-03 | End: 2024-02-01

## 2024-01-03 RX ORDER — LORAZEPAM 1 MG/1
1 TABLET ORAL EVERY 6 HOURS PRN
COMMUNITY
End: 2024-01-03 | Stop reason: SDUPTHER

## 2024-01-03 RX ORDER — LORAZEPAM 1 MG/1
1 TABLET ORAL EVERY 6 HOURS PRN
Qty: 60 TABLET | Refills: 0 | Status: SHIPPED | OUTPATIENT
Start: 2024-01-03 | End: 2024-02-02

## 2024-01-03 ASSESSMENT — PATIENT HEALTH QUESTIONNAIRE - PHQ9
3. TROUBLE FALLING OR STAYING ASLEEP: 0
6. FEELING BAD ABOUT YOURSELF - OR THAT YOU ARE A FAILURE OR HAVE LET YOURSELF OR YOUR FAMILY DOWN: 0
5. POOR APPETITE OR OVEREATING: 0
SUM OF ALL RESPONSES TO PHQ QUESTIONS 1-9: 0
SUM OF ALL RESPONSES TO PHQ QUESTIONS 1-9: 0
1. LITTLE INTEREST OR PLEASURE IN DOING THINGS: 0
10. IF YOU CHECKED OFF ANY PROBLEMS, HOW DIFFICULT HAVE THESE PROBLEMS MADE IT FOR YOU TO DO YOUR WORK, TAKE CARE OF THINGS AT HOME, OR GET ALONG WITH OTHER PEOPLE: 0
SUM OF ALL RESPONSES TO PHQ9 QUESTIONS 1 & 2: 0
2. FEELING DOWN, DEPRESSED OR HOPELESS: 0
SUM OF ALL RESPONSES TO PHQ QUESTIONS 1-9: 0
7. TROUBLE CONCENTRATING ON THINGS, SUCH AS READING THE NEWSPAPER OR WATCHING TELEVISION: 0
SUM OF ALL RESPONSES TO PHQ QUESTIONS 1-9: 0
8. MOVING OR SPEAKING SO SLOWLY THAT OTHER PEOPLE COULD HAVE NOTICED. OR THE OPPOSITE, BEING SO FIGETY OR RESTLESS THAT YOU HAVE BEEN MOVING AROUND A LOT MORE THAN USUAL: 0
9. THOUGHTS THAT YOU WOULD BE BETTER OFF DEAD, OR OF HURTING YOURSELF: 0
4. FEELING TIRED OR HAVING LITTLE ENERGY: 0

## 2024-01-03 NOTE — PATIENT INSTRUCTIONS
[facility]    Patient : Dominique Darnell Age: 81 year old Sex: female   MRN: [id number] Encounter Date:       HISTORY       Chief Complaint   Patient presents with   • Chest Pain   • Breathing Problem     HPI    This is a very pleasant 81-year-old female who arrives to the emergency room because she has some chest pain she mentions it is in the center of her chest and felt like a pressure sensation she mentions she was at [employer] and her pain continued she is a dialysis patient on Monday Wednesday Friday she mentions she has a fistula in her right arm she also mentions she was feeling some shortness of breath which is now resolved.  Denies any other complaints or fevers or chills.        Past Medical History:   Diagnosis Date   • Anemia    • Arthritis    • Chronic kidney disease    • Congestive heart failure (CMS/HCC)    • COPD (chronic obstructive pulmonary disease) (CMS/HCC)    • Essential (primary) hypertension    • Gastroesophageal reflux disease    • Gout    • High cholesterol    • Malignant neoplasm (CMS/HCC)    • Sleep apnea    • Thyroid condition        Past Surgical History:   Procedure Laterality Date   • EYE SURGERY     • HYSTERECTOMY         Family History   Problem Relation Age of Onset   • Hypertension Mother    • Cancer Sister    • Cancer Brother    • Heart disease Brother    • Diabetes Brother        Social History     Tobacco Use   • Smoking status: Former   • Smokeless tobacco: Never   Substance Use Topics   • Alcohol use: Not Currently   • Drug use: Never       E-cigarette/Vaping     E-Cigarette/Vaping Substances & Devices         Current Discharge Medication List      Prior to Admission Medications    Details   nortriptyline (PAMELOR) 10 MG capsule Take 20 mg by mouth nightly.      sevelamer (RENAGEL) 800 MG tablet Take 800 mg by mouth in the morning and 800 mg at noon and 800 mg in the evening. Take with meals.      cinacalcet (SENSIPAR) 30 MG tablet Take 30 mg by mouth daily.     SURVEY:     You may be receiving a survey from [facility] regarding your visit today.     Please complete the survey to enable us to provide the highest quality of care to you and your family.     If you cannot score us a very good on any question, please call the office to discuss how we could have made your experience a very good one.     Thank you,    Melchor Schneider, APRN-CNP  Maddi Starks, APRN-CNP  Hermelinda, LAURA Curtis, MELVA Schaefer, CMA  Linda, CMA  Leslie, PCA  Sonia, PM       famotidine (PEPCID) 20 MG tablet Take 20 mg by mouth in the morning and 20 mg in the evening.      carvedilol (COREG) 12.5 MG tablet Take 1 tablet by mouth 2 times daily (with meals). Take on non-dialysis days only  Qty: 60 tablet, Refills: 2      apixaBAN (ELIQUIS) 5 MG Tab Take 1 tablet by mouth every 12 hours.  Qty: 60 tablet, Refills: 2      epoetin isaias-epbx (RETACRIT) 38875 UNIT/ML injection Inject 0.6 mLs into the skin.  Qty: 6.6 mL      midodrine (PROAMATINE) 5 MG tablet Take 1 tablet by mouth 3 days a week. Take 30 minutes prior to dialysis.  Qty: 15 tablet, Refills: 2      allopurinol (ZYLOPRIM) 100 MG tablet Take 100 mg by mouth daily.      fluticasone-vilanterol (Breo Ellipta) 100-25 MCG/INH inhaler Inhale 1 puff into the lungs daily.      calcitRIOL (ROCALTROL) 0.25 MCG capsule Take 0.25 mcg by mouth daily.      cholecalciferol (VITAMIN D) 25 mcg (1,000 units) tablet Take 1,000 Units by mouth daily.      fluorometholone (FML) 0.1 % ophthalmic suspension Place 1 drop into both eyes 3 times daily.       lovastatin (MEVACOR) 40 MG tablet Take 40 mg by mouth daily.      Multiple Vitamins-Minerals (Oncovite) Tab Take 1 tablet by mouth daily.      polyvinyl alcohol (LIQUIFILM TEARS) 1.4 % ophthalmic solution Apply 1 drop to eye as needed.      ipratropium-albuterol (DUONEB) 0.5-2.5 (3) MG/3ML nebulizer solution Take 3 mLs by nebulization every 6 hours as needed for Wheezing.               Allergies   Allergen Reactions   • Tetanus Antitoxin HIVES   • Contrast Media RASH   • Penicillins RASH   • Sulfa Antibiotics RASH       REVIEW OF SYSTEMS      Review of Systems    10 REVIEW OF SYSTEMS REVIEWED AND NEGATIVE EXCEPT FOR HPI    PHYSICAL EXAM        ED Triage Vitals [09/24/23 1434]   ED Triage Vitals Group      Temp 98.4 °F (36.9 °C)      Heart Rate 92      Resp 16      /69      SpO2 96 %      EtCO2 mmHg       Height       Weight       Weight Scale Used       BMI (Calculated)       IBW/kg (Calculated)         Physical Exam    Physical Exam  Vitals and nursing note reviewed.   Constitutional:       Appearance: Well Developed, Well Noursihed  HENT:      Head: Normocephalic and atraumatic.   Eyes:      Pupils: Pupils are equal, round, and reactive to light.   Cardiovascular:      Rate and Rhythm: Normal rate.   Pulmonary:      Effort: Pulmonary effort is normal.      Breath sounds: Normal breath sounds.   Abdominal:      General: Bowel sounds are normal.      Palpations: Abdomen is soft.      Tenderness: There is no abdominal tenderness. There is no rebound.   Musculoskeletal:         General: Normal range of motion.      Cervical back: Normal range of motion and neck supple.   Skin:     General: Skin is warm and dry.  Patient with a fistula in the right arm.  Neurological:      Mental Status: alert and oriented to person, place, and time.      Cranial Nerves: No cranial nerve deficit.      Deep Tendon Reflexes: Reflexes normal.   Psychiatric:         Behavior: Behavior normal.                 LAB RESULTS      Results for orders placed or performed during the hospital encounter of 09/24/23   Comprehensive Metabolic Panel   Result Value Ref Range    Fasting Status      Sodium 147 (H) 135 - 145 mmol/L    Potassium 4.2 3.4 - 5.1 mmol/L    Chloride 106 97 - 110 mmol/L    Carbon Dioxide 33 (H) 21 - 32 mmol/L    Anion Gap 12 7 - 19 mmol/L    Glucose 77 70 - 99 mg/dL    BUN 53 (H) 6 - 20 mg/dL    Creatinine 6.28 (H) 0.51 - 0.95 mg/dL    Glomerular Filtration Rate 6 (L) >=60    BUN/Cr 8 7 - 25    Calcium 9.4 8.4 - 10.2 mg/dL    Bilirubin, Total 0.6 0.2 - 1.0 mg/dL    GOT/AST 22 <=37 Units/L    GPT/ALT 12 <64 Units/L    Alkaline Phosphatase 90 45 - 117 Units/L    Albumin 3.4 (L) 3.6 - 5.1 g/dL    Protein, Total 7.8 6.4 - 8.2 g/dL    Globulin 4.4 (H) 2.0 - 4.0 g/dL    A/G Ratio 0.8 (L) 1.0 - 2.4   TROPONIN I, HIGH SENSITIVITY   Result Value Ref Range    Troponin I, High Sensitivity 5,903 (HH) <52 ng/L   CBC with Automated  Differential (performable only)   Result Value Ref Range    WBC 8.0 4.2 - 11.0 K/mcL    RBC 3.72 (L) 4.00 - 5.20 mil/mcL    HGB 11.6 (L) 12.0 - 15.5 g/dL    HCT 37.7 36.0 - 46.5 %    .3 (H) 78.0 - 100.0 fl    MCH 31.2 26.0 - 34.0 pg    MCHC 30.8 (L) 32.0 - 36.5 g/dL    RDW-CV 15.5 (H) 11.0 - 15.0 %    RDW-SD 58.4 (H) 39.0 - 50.0 fL     (L) 140 - 450 K/mcL    NRBC 0 <=0 /100 WBC    Neutrophil, Percent 74 %    Lymphocytes, Percent 15 %    Mono, Percent 7 %    Eosinophils, Percent 3 %    Basophils, Percent 0 %    Immature Granulocytes 1 %    Absolute Neutrophils 5.9 1.8 - 7.7 K/mcL    Absolute Lymphocytes 1.2 1.0 - 4.0 K/mcL    Absolute Monocytes 0.6 0.3 - 0.9 K/mcL    Absolute Eosinophils  0.2 0.0 - 0.5 K/mcL    Absolute Basophils 0.0 0.0 - 0.3 K/mcL    Absolute Immature Granulocytes 0.0 0.0 - 0.2 K/mcL       ED Course     Procedures      EKG INTERPRETATION         EKG Interpretation  Rate: Sinus rhythm with PVCs rate of 95 no other ischemic changes  Rhythm: normal sinus rhythm   Normal AR intervals  Normal QRS  Non-specific changes  EP interpertation    RADIOLOGY RESULTS         Imaging Results          XR CHEST AP OR PA (Final result)  Result time 09/24/23 14:08:05   Procedure changed from XR CHEST PA AND LATERAL 2 VIEWS     Final result                 Impression:      1.   Stable cardiomegaly with diffuse bilateral interstitial and alveolar  opacities, may reflect interstitial edema. Superimposed infection/atypical  infection is not excluded.  2.   Similar appearance of prominent right hilar region, indeterminate.  This could reflect enlarged pulmonary artery or similar mediastinal  adenopathy.      Electronically Signed by: RAFFI NEWELL MD   Signed on: 9/24/2023 2:08 PM   Workstation ID: [id number]             Narrative:    RADIOGRAPHIC EXAMINATION OF THE CHEST: 9/24/2023 1:45 PM    CLINICAL HISTORY: 81 years of age, Female, Chest Pain.    COMPARISON: 11/26/2021.    PROCEDURE COMMENTS: Single  portable AP view of the chest.     FINDINGS:    Single view of the chest demonstrates stable mild cardiomegaly, aortic  calcification. Fullness of the right upper perihilar region, likely  reflecting known thyroid goiter. Similar enlarged right hilar region.  Diffuse bilateral interstitial and alveolar airspace opacities are noted.  Similar 2 cm right upper lobe nodule with therapy seeds in place. Left  basilar/retrocardiac airspace opacity. No substantial pleural fluid. No  appreciable pneumothorax.                                ED Medication Orders (From admission, onward)    None            Patient told of all nonspecific incidental finding on imaging studies if  performed in the emergency room with extensive discussion on need for follow up and discussing these finding with a primary to get the appropriate evaluation and care.  Patient agreed to and verbalized understanding.     Patient also told that all reads are preliminary reads from the ER and final reads are to follow.     MEDICAL DECISION MAKING     Limited Chart Review of Old Medical Records Performed  Nursing Records and Triage Note were Reviewed  I have seen and evaluated the patient in a face to face encounter.   I have also reviewed the patient's past medical/surgical history, medications, allergies and nursing notes and documentation including vital signs.   If laboratory and radiology tests have been ordered, the results have been reviewed, interpreted and considered in the medical decision making process.       MDM    This a very pleasant patient who arrives with chest pain she is a dialysis patient    EKG had more nonspecific changes of the sinus rhythm    ED Course as of 09/24/23 2029   Sun Sep 24, 2023   1954 Patient's troponin found to be 5903 she has no active chest pain EKG was reviewed again which shows normal sinus rhythm with occasional PVCs but no ischemic changes. [WA]   2028 We will cancel heparin drip patient is on Eliquis per  medical reconciliation we will hold off and further recommendations per cardiology. [WA]      ED Course User Index  [WA] Kofi Francois DO             CONSULTANT CALLS        Patient was admitted to the on-call physician today.           DIAGNOSIS/CLINICAL IMPRESSION     ED Diagnosis   1. Chest pain, unspecified type        2. ESRD on dialysis (CMD)        3. Dyspnea, unspecified type        4. Elevated troponin                DISPOSITION            Patient to be admitted with cardiology on consultation found to have an elevated troponin.    Disposition        Admit 9/24/2023  6:39 PM  Telemetry Bed?: Yes  Admitting Physician: INES NARAYAN [[id number]]  Is this a telephone or verbal order?: This is a telephone order from the admitting physician                 Kofi Francois,   09/24/23 1956       Kofi Francois,   09/24/23 2030

## 2024-01-03 NOTE — TELEPHONE ENCOUNTER
Health Maintenance   Topic Date Due    Lipids  Never done    Colorectal Cancer Screen  Never done    COVID-19 Vaccine (3 - 2023-24 season) 09/01/2023    DTaP/Tdap/Td vaccine (1 - Tdap) 03/23/2024 (Originally 7/10/2019)    Shingles vaccine (2 of 2) 03/23/2024 (Originally 12/29/2020)    Hepatitis C screen  03/23/2024 (Originally 9/7/1984)    HIV screen  03/23/2024 (Originally 9/7/1981)    Pneumococcal 0-64 years Vaccine (2 - PPSV23 or PCV20) 04/27/2024 (Originally 12/29/2020)    Hepatitis B vaccine (1 of 3 - 3-dose series) 10/02/2024 (Originally 1965)    Depression Monitoring  12/06/2024    Breast cancer screen  05/10/2025    Cervical cancer screen  02/24/2027    Flu vaccine  Completed    Hepatitis A vaccine  Aged Out    Hib vaccine  Aged Out    Polio vaccine  Aged Out    Meningococcal (ACWY) vaccine  Aged Out    Diabetes screen  Discontinued             (applicable per patient's age: Cancer Screenings, Depression Screening, Fall Risk Screening, Immunizations)    Hemoglobin A1C (%)   Date Value   10/10/2023 5.1     AST (U/L)   Date Value   10/12/2023 13     ALT (U/L)   Date Value   10/12/2023 8     BUN (mg/dL)   Date Value   10/12/2023 10      (goal A1C is < 7)   (goal LDL is <100) need 30-50% reduction from baseline     BP Readings from Last 3 Encounters:   12/06/23 110/76   10/30/23 108/76   10/26/23 138/88    (goal /80)      All Future Testing planned in [facility]:  Lab Frequency Next Occurrence   MRI CERVICAL SPINE WO CONTRAST Once 06/26/2023   CT CHEST ABDOMEN PELVIS WO CONTRAST Additional Contrast? Radiologist Recommendation Once 10/10/2023   Iron and TIBC Once 06/11/2024   Vitamin B12 & Folate Once 06/11/2024   Ferritin Once 06/11/2024       Next Visit Date:  Future Appointments   Date Time Provider Department Center   1/3/2024 10:20 AM Maddi Starks, APRN - CNP [facility]            Patient Active Problem List:     Bipolar 1 disorder (HCC)     Chest pain in adult     Hypertension

## 2024-01-03 NOTE — PROGRESS NOTES
[facility]  CARMEN WALL CNP  [facility]  [address] [place] [id number]  Dept: [phone]  Dept Fax: [phone]      Name: Tanisha Coleman  : 1965         Chief Complaint:     Chief Complaint   Patient presents with    Hypertension     3 month check.     Medication Refill    Vaginal Bleeding     Patient c/o vaginal bleeding but has had a Hysterectomy. Patient was given OBGYN phone number as well.        History of Present Illness:      Tanisha Coleman is a 58 y.o.  female who presents with Hypertension (3 month check. ), Medication Refill, and Vaginal Bleeding (Patient c/o vaginal bleeding but has had a Hysterectomy. Patient was given OBGYN phone number as well. )      DANIELLE Garay is here today for a routine office visit.  She states she was sick for a couple day but she is feeling better now.  She states that she did quit drinking soda and has lost some weight.  She states that she is trying to lose weight to help with her weight loss.    She is having vaginal bleeding.  She states that she has not been sexually active.  She has had an ablation in the past.  She has not had a period for 15 years.  She states the blood was bright red when she wiped.  She states this was from the vaginal area and denies hemorrhoids.  She states this has happened before and has resolved after a few days.  She states \"I feel like I have a cut up there\".  She denies shaving.  She has not seen any genital warts.  Her last cervical exam was in April.      She states she has been feeling well.  She states she had some increased anxiety over the holidays.  She is continuing to use the Ativan 2-3 times a day to control her symptoms.      Past Medical History:     Past Medical History:   Diagnosis Date    Arthritis     Asthma     COPD (chronic obstructive pulmonary disease) (HCC)     Eczema of face     Genital warts     Hypertension     Psychiatric problem     Restless legs     Sleep apnea

## 2024-01-18 ENCOUNTER — HOSPITAL ENCOUNTER (EMERGENCY)
Age: 59
Discharge: PSYCHIATRIC OR SUBSTANCE USE FACILITY | End: 2024-01-19
Attending: EMERGENCY MEDICINE
Payer: COMMERCIAL

## 2024-01-18 ENCOUNTER — TELEPHONE (OUTPATIENT)
Dept: PRIMARY CARE CLINIC | Age: 59
End: 2024-01-18

## 2024-01-18 VITALS
WEIGHT: 231 LBS | OXYGEN SATURATION: 95 % | HEART RATE: 67 BPM | BODY MASS INDEX: 39.65 KG/M2 | SYSTOLIC BLOOD PRESSURE: 97 MMHG | TEMPERATURE: 98.7 F | DIASTOLIC BLOOD PRESSURE: 67 MMHG | RESPIRATION RATE: 18 BRPM

## 2024-01-18 DIAGNOSIS — N95.0 PMB (POSTMENOPAUSAL BLEEDING): ICD-10-CM

## 2024-01-18 DIAGNOSIS — F41.9 ANXIETY: Primary | ICD-10-CM

## 2024-01-18 DIAGNOSIS — F32.A DEPRESSION WITH SUICIDAL IDEATION: Primary | ICD-10-CM

## 2024-01-18 DIAGNOSIS — F31.9 BIPOLAR 1 DISORDER (HCC): ICD-10-CM

## 2024-01-18 DIAGNOSIS — R45.851 DEPRESSION WITH SUICIDAL IDEATION: Primary | ICD-10-CM

## 2024-01-18 LAB
ALBUMIN SERPL-MCNC: 4.1 G/DL (ref 3.5–5.2)
ALBUMIN/GLOB SERPL: 1.2 {RATIO} (ref 1–2.5)
ALP SERPL-CCNC: 68 U/L (ref 35–104)
ALT SERPL-CCNC: 9 U/L (ref 5–33)
AMPHET UR QL SCN: NEGATIVE
ANION GAP SERPL CALCULATED.3IONS-SCNC: 11 MMOL/L (ref 9–17)
APAP SERPL-MCNC: <5 UG/ML (ref 10–30)
AST SERPL-CCNC: 17 U/L
BACTERIA URNS QL MICRO: ABNORMAL
BARBITURATES UR QL SCN: NEGATIVE
BASOPHILS # BLD: 0.16 K/UL (ref 0–0.2)
BASOPHILS NFR BLD: 1 % (ref 0–2)
BENZODIAZ UR QL: NEGATIVE
BILIRUB SERPL-MCNC: 0.3 MG/DL (ref 0.3–1.2)
BILIRUB UR QL STRIP: NEGATIVE
BUN SERPL-MCNC: 12 MG/DL (ref 6–20)
BUN/CREAT SERPL: 15 (ref 9–20)
BUPRENORPHINE UR QL: NEGATIVE
CALCIUM SERPL-MCNC: 9.2 MG/DL (ref 8.6–10.4)
CANNABINOIDS UR QL SCN: NEGATIVE
CHLORIDE SERPL-SCNC: 100 MMOL/L (ref 98–107)
CLARITY UR: CLEAR
CO2 SERPL-SCNC: 27 MMOL/L (ref 20–31)
COCAINE UR QL SCN: NEGATIVE
COLOR UR: YELLOW
CREAT SERPL-MCNC: 0.8 MG/DL (ref 0.5–0.9)
CRYSTALS URNS MICRO: ABNORMAL /HPF
EOSINOPHIL # BLD: 0.47 K/UL (ref 0–0.44)
EOSINOPHILS RELATIVE PERCENT: 4 % (ref 1–4)
EPI CELLS #/AREA URNS HPF: ABNORMAL /HPF (ref 0–25)
ERYTHROCYTE [DISTWIDTH] IN BLOOD BY AUTOMATED COUNT: 13.5 % (ref 11.8–14.4)
ETHANOL PERCENT: <0.01 %
ETHANOLAMINE SERPL-MCNC: <10 MG/DL
FENTANYL UR QL: NEGATIVE
GFR SERPL CREATININE-BSD FRML MDRD: >60 ML/MIN/1.73M2
GLUCOSE SERPL-MCNC: 99 MG/DL (ref 70–99)
GLUCOSE UR STRIP-MCNC: NEGATIVE MG/DL
HCT VFR BLD AUTO: 46.8 % (ref 36.3–47.1)
HGB BLD-MCNC: 14.6 G/DL (ref 11.9–15.1)
HGB UR QL STRIP.AUTO: ABNORMAL
IMM GRANULOCYTES # BLD AUTO: 0.11 K/UL (ref 0–0.3)
IMM GRANULOCYTES NFR BLD: 1 %
KETONES UR STRIP-MCNC: NEGATIVE MG/DL
LEUKOCYTE ESTERASE UR QL STRIP: NEGATIVE
LYMPHOCYTES NFR BLD: 3.78 K/UL (ref 1.1–3.7)
LYMPHOCYTES RELATIVE PERCENT: 30 % (ref 24–43)
MCH RBC QN AUTO: 28.9 PG (ref 25.2–33.5)
MCHC RBC AUTO-ENTMCNC: 31.2 G/DL (ref 28.4–34.8)
MCV RBC AUTO: 92.7 FL (ref 82.6–102.9)
METHADONE UR QL: NEGATIVE
MONOCYTES NFR BLD: 0.65 K/UL (ref 0.1–1.2)
MONOCYTES NFR BLD: 5 % (ref 3–12)
MUCOUS THREADS URNS QL MICRO: ABNORMAL
NEUTROPHILS NFR BLD: 59 % (ref 36–65)
NEUTS SEG NFR BLD: 7.46 K/UL (ref 1.5–8.1)
NITRITE UR QL STRIP: NEGATIVE
NRBC BLD-RTO: 0 PER 100 WBC
OPIATES UR QL SCN: POSITIVE
OXYCODONE UR QL SCN: NEGATIVE
PCP UR QL SCN: NEGATIVE
PH UR STRIP: 6 [PH] (ref 5–9)
PLATELET # BLD AUTO: 307 K/UL (ref 138–453)
PMV BLD AUTO: 10.4 FL (ref 8.1–13.5)
POTASSIUM SERPL-SCNC: 4.6 MMOL/L (ref 3.7–5.3)
PROT SERPL-MCNC: 7.5 G/DL (ref 6.4–8.3)
PROT UR STRIP-MCNC: NEGATIVE MG/DL
RBC # BLD AUTO: 5.05 M/UL (ref 3.95–5.11)
RBC #/AREA URNS HPF: ABNORMAL /HPF (ref 0–2)
SALICYLATES SERPL-MCNC: <1 MG/DL (ref 3–10)
SODIUM SERPL-SCNC: 138 MMOL/L (ref 135–144)
SP GR UR STRIP: >1.03 (ref 1.01–1.02)
TEST INFORMATION: ABNORMAL
UROBILINOGEN UR STRIP-ACNC: NORMAL EU/DL (ref 0–1)
WBC #/AREA URNS HPF: ABNORMAL /HPF (ref 0–5)
WBC OTHER # BLD: 12.6 K/UL (ref 3.5–11.3)

## 2024-01-18 PROCEDURE — 6370000000 HC RX 637 (ALT 250 FOR IP): Performed by: EMERGENCY MEDICINE

## 2024-01-18 PROCEDURE — 6370000000 HC RX 637 (ALT 250 FOR IP)

## 2024-01-18 PROCEDURE — 80143 DRUG ASSAY ACETAMINOPHEN: CPT

## 2024-01-18 PROCEDURE — G0480 DRUG TEST DEF 1-7 CLASSES: HCPCS

## 2024-01-18 PROCEDURE — 80053 COMPREHEN METABOLIC PANEL: CPT

## 2024-01-18 PROCEDURE — 80307 DRUG TEST PRSMV CHEM ANLYZR: CPT

## 2024-01-18 PROCEDURE — 36415 COLL VENOUS BLD VENIPUNCTURE: CPT

## 2024-01-18 PROCEDURE — 85025 COMPLETE CBC W/AUTO DIFF WBC: CPT

## 2024-01-18 PROCEDURE — 80179 DRUG ASSAY SALICYLATE: CPT

## 2024-01-18 PROCEDURE — 99285 EMERGENCY DEPT VISIT HI MDM: CPT

## 2024-01-18 PROCEDURE — 81001 URINALYSIS AUTO W/SCOPE: CPT

## 2024-01-18 RX ORDER — LIDOCAINE 4 G/G
1 PATCH TOPICAL ONCE
Status: DISCONTINUED | OUTPATIENT
Start: 2024-01-18 | End: 2024-01-19 | Stop reason: HOSPADM

## 2024-01-18 RX ORDER — HYDROCODONE BITARTRATE AND ACETAMINOPHEN 5; 325 MG/1; MG/1
1 TABLET ORAL ONCE
Status: COMPLETED | OUTPATIENT
Start: 2024-01-18 | End: 2024-01-18

## 2024-01-18 RX ORDER — METOPROLOL TARTRATE 50 MG/1
50 TABLET, FILM COATED ORAL ONCE
Status: COMPLETED | OUTPATIENT
Start: 2024-01-18 | End: 2024-01-18

## 2024-01-18 RX ORDER — ROPINIROLE 1 MG/1
4 TABLET, FILM COATED ORAL NIGHTLY
Status: COMPLETED | OUTPATIENT
Start: 2024-01-18 | End: 2024-01-18

## 2024-01-18 RX ORDER — LORAZEPAM 1 MG/1
1 TABLET ORAL ONCE
Status: COMPLETED | OUTPATIENT
Start: 2024-01-18 | End: 2024-01-18

## 2024-01-18 RX ORDER — HYDROCODONE BITARTRATE AND ACETAMINOPHEN 5; 325 MG/1; MG/1
TABLET ORAL
Status: COMPLETED
Start: 2024-01-18 | End: 2024-01-18

## 2024-01-18 RX ORDER — HYDROXYZINE HYDROCHLORIDE 25 MG/1
25 TABLET, FILM COATED ORAL ONCE
Status: COMPLETED | OUTPATIENT
Start: 2024-01-18 | End: 2024-01-18

## 2024-01-18 RX ORDER — HYDROCODONE BITARTRATE AND ACETAMINOPHEN 5; 325 MG/1; MG/1
1 TABLET ORAL EVERY 4 HOURS PRN
Status: DISCONTINUED | OUTPATIENT
Start: 2024-01-18 | End: 2024-01-19 | Stop reason: HOSPADM

## 2024-01-18 RX ORDER — PANTOPRAZOLE SODIUM 40 MG/1
40 TABLET, DELAYED RELEASE ORAL ONCE
Status: COMPLETED | OUTPATIENT
Start: 2024-01-18 | End: 2024-01-18

## 2024-01-18 RX ADMIN — HYDROCODONE BITARTRATE AND ACETAMINOPHEN 1 TABLET: 5; 325 TABLET ORAL at 14:49

## 2024-01-18 RX ADMIN — HYDROCODONE BITARTRATE AND ACETAMINOPHEN 1 TABLET: 5; 325 TABLET ORAL at 20:15

## 2024-01-18 RX ADMIN — PANTOPRAZOLE SODIUM 40 MG: 40 TABLET, DELAYED RELEASE ORAL at 20:15

## 2024-01-18 RX ADMIN — LORAZEPAM 1 MG: 1 TABLET ORAL at 20:14

## 2024-01-18 RX ADMIN — METOPROLOL TARTRATE 50 MG: 50 TABLET, FILM COATED ORAL at 20:15

## 2024-01-18 RX ADMIN — ROPINIROLE HYDROCHLORIDE 4 MG: 1 TABLET, FILM COATED ORAL at 20:15

## 2024-01-18 RX ADMIN — HYDROXYZINE HYDROCHLORIDE 25 MG: 25 TABLET ORAL at 14:49

## 2024-01-18 ASSESSMENT — PAIN DESCRIPTION - DESCRIPTORS
DESCRIPTORS: DISCOMFORT
DESCRIPTORS: DISCOMFORT

## 2024-01-18 ASSESSMENT — PAIN DESCRIPTION - LOCATION
LOCATION: BACK
LOCATION: BACK

## 2024-01-18 ASSESSMENT — LIFESTYLE VARIABLES
HOW MANY STANDARD DRINKS CONTAINING ALCOHOL DO YOU HAVE ON A TYPICAL DAY: PATIENT DOES NOT DRINK
HOW OFTEN DO YOU HAVE A DRINK CONTAINING ALCOHOL: NEVER

## 2024-01-18 ASSESSMENT — PAIN SCALES - GENERAL
PAINLEVEL_OUTOF10: 9
PAINLEVEL_OUTOF10: 9

## 2024-01-18 ASSESSMENT — PAIN - FUNCTIONAL ASSESSMENT: PAIN_FUNCTIONAL_ASSESSMENT: NONE - DENIES PAIN

## 2024-01-18 NOTE — ED PROVIDER NOTES
[facility] ED  EMERGENCY DEPARTMENT ENCOUNTER      Pt Name: Tanisha Coleman  MRN: [id number]  Birthdate 1965  Date of evaluation: 1/18/2024  Provider: Amaya Randall MD    CHIEF COMPLAINT       Chief Complaint   Patient presents with    Mental Health Problem     Patient states she is under a lot of stress and has had a lot of deaths in her family this year and is feeling overwhelmed and is looking for help         HISTORY OF PRESENT ILLNESS   (Location/Symptom, Timing/Onset, Context/Setting, Quality, Duration, Modifying Factors, Severity)  Note limiting factors.   Tanisha Coleman is a 58 y.o. female who presents to the emergency department ***     88-year-old female presented emergency department stating she feels overwhelmed and no longer wishes to live.  She does have a known history of depression.  She has been taking medications prescribed by her primary care provider but states they are not helping.  She does feel they need to be adjusted.  She has required hospitalization in the past and has tried to harm herself.  She does not have any specific plan but states she thinks about driving in front of a train or overdosing.  Denies any alcohol or illicit drug use.  Denies any fevers chills headaches UTI symptoms URI symptoms chest pain shortness of breath or any other acute concern        Nursing Notes were reviewed.    REVIEW OF SYSTEMS    (2-9 systems for level 4, 10 or more for level 5)     Review of Systems   All other systems reviewed and are negative.      Except as noted above the remainder of the review of systems was reviewed and negative.       PAST MEDICAL HISTORY     Past Medical History:   Diagnosis Date    Arthritis     Asthma     COPD (chronic obstructive pulmonary disease) (HCC)     Eczema of face     Genital warts     Hypertension     Psychiatric problem     Restless legs     Sleep apnea     Stroke (HCC)          SURGICAL HISTORY       Past Surgical History:   Procedure Laterality

## 2024-01-18 NOTE — TELEPHONE ENCOUNTER
Patient contacted the office in regards to feeling \"like she is spiraling out of control and worried that she is falling into a deep depression.\" Patient was informed to see the ED for this concern since she is worried of hurting herself. Patient was also given [facility] crisis number.     Patient verbalized understanding.

## 2024-01-19 ENCOUNTER — HOSPITAL ENCOUNTER (INPATIENT)
Age: 59
LOS: 5 days | Discharge: HOME | DRG: 754 | End: 2024-01-24
Attending: PSYCHIATRY & NEUROLOGY | Admitting: PSYCHIATRY & NEUROLOGY
Payer: COMMERCIAL

## 2024-01-19 PROBLEM — R45.851 DEPRESSION WITH SUICIDAL IDEATION: Status: ACTIVE | Noted: 2024-01-19

## 2024-01-19 PROBLEM — F32.A DEPRESSION WITH SUICIDAL IDEATION: Status: ACTIVE | Noted: 2024-01-19

## 2024-01-19 PROCEDURE — 6370000000 HC RX 637 (ALT 250 FOR IP): Performed by: PSYCHIATRY & NEUROLOGY

## 2024-01-19 PROCEDURE — 6370000000 HC RX 637 (ALT 250 FOR IP): Performed by: NURSE PRACTITIONER

## 2024-01-19 PROCEDURE — 6370000000 HC RX 637 (ALT 250 FOR IP): Performed by: INTERNAL MEDICINE

## 2024-01-19 PROCEDURE — 1240000000 HC EMOTIONAL WELLNESS R&B

## 2024-01-19 PROCEDURE — APPSS60 APP SPLIT SHARED TIME 46-60 MINUTES

## 2024-01-19 PROCEDURE — 99223 1ST HOSP IP/OBS HIGH 75: CPT | Performed by: PSYCHIATRY & NEUROLOGY

## 2024-01-19 PROCEDURE — 99223 1ST HOSP IP/OBS HIGH 75: CPT | Performed by: INTERNAL MEDICINE

## 2024-01-19 RX ORDER — ROPINIROLE 2 MG/1
4 TABLET, FILM COATED ORAL NIGHTLY
Status: DISCONTINUED | OUTPATIENT
Start: 2024-01-19 | End: 2024-01-24 | Stop reason: HOSPADM

## 2024-01-19 RX ORDER — SENNOSIDES A AND B 8.6 MG/1
1 TABLET, FILM COATED ORAL EVERY OTHER DAY
Status: DISCONTINUED | OUTPATIENT
Start: 2024-01-19 | End: 2024-01-24 | Stop reason: HOSPADM

## 2024-01-19 RX ORDER — ALBUTEROL SULFATE 90 UG/1
2 AEROSOL, METERED RESPIRATORY (INHALATION) EVERY 6 HOURS PRN
Status: DISCONTINUED | OUTPATIENT
Start: 2024-01-19 | End: 2024-01-24 | Stop reason: HOSPADM

## 2024-01-19 RX ORDER — DIAZEPAM 5 MG/1
5 TABLET ORAL 3 TIMES DAILY
Status: DISPENSED | OUTPATIENT
Start: 2024-01-19 | End: 2024-01-20

## 2024-01-19 RX ORDER — METOPROLOL TARTRATE 50 MG/1
50 TABLET, FILM COATED ORAL DAILY
Status: DISCONTINUED | OUTPATIENT
Start: 2024-01-19 | End: 2024-01-24 | Stop reason: HOSPADM

## 2024-01-19 RX ORDER — ATORVASTATIN CALCIUM 20 MG/1
40 TABLET, FILM COATED ORAL NIGHTLY
Status: DISCONTINUED | OUTPATIENT
Start: 2024-01-19 | End: 2024-01-24 | Stop reason: HOSPADM

## 2024-01-19 RX ORDER — ACETAMINOPHEN 325 MG/1
650 TABLET ORAL EVERY 4 HOURS PRN
Status: DISCONTINUED | OUTPATIENT
Start: 2024-01-19 | End: 2024-01-24 | Stop reason: HOSPADM

## 2024-01-19 RX ORDER — MAGNESIUM HYDROXIDE/ALUMINUM HYDROXICE/SIMETHICONE 120; 1200; 1200 MG/30ML; MG/30ML; MG/30ML
30 SUSPENSION ORAL EVERY 6 HOURS PRN
Status: DISCONTINUED | OUTPATIENT
Start: 2024-01-19 | End: 2024-01-24 | Stop reason: HOSPADM

## 2024-01-19 RX ORDER — BUDESONIDE AND FORMOTEROL FUMARATE DIHYDRATE 160; 4.5 UG/1; UG/1
2 AEROSOL RESPIRATORY (INHALATION)
Status: DISCONTINUED | OUTPATIENT
Start: 2024-01-19 | End: 2024-01-24 | Stop reason: HOSPADM

## 2024-01-19 RX ORDER — HYDROXYZINE 50 MG/1
50 TABLET, FILM COATED ORAL 3 TIMES DAILY PRN
Status: DISCONTINUED | OUTPATIENT
Start: 2024-01-19 | End: 2024-01-24 | Stop reason: HOSPADM

## 2024-01-19 RX ORDER — IBUPROFEN 400 MG/1
400 TABLET ORAL EVERY 6 HOURS PRN
Status: DISCONTINUED | OUTPATIENT
Start: 2024-01-19 | End: 2024-01-24 | Stop reason: HOSPADM

## 2024-01-19 RX ORDER — HYDROCODONE BITARTRATE AND ACETAMINOPHEN 10; 325 MG/1; MG/1
1 TABLET ORAL EVERY 4 HOURS PRN
Status: DISCONTINUED | OUTPATIENT
Start: 2024-01-19 | End: 2024-01-24 | Stop reason: HOSPADM

## 2024-01-19 RX ORDER — FOLIC ACID 1 MG/1
1 TABLET ORAL DAILY
Status: DISCONTINUED | OUTPATIENT
Start: 2024-01-19 | End: 2024-01-24 | Stop reason: HOSPADM

## 2024-01-19 RX ORDER — TRAZODONE HYDROCHLORIDE 50 MG/1
50 TABLET ORAL NIGHTLY PRN
Status: DISCONTINUED | OUTPATIENT
Start: 2024-01-19 | End: 2024-01-20

## 2024-01-19 RX ORDER — ASPIRIN 81 MG/1
81 TABLET ORAL DAILY
Status: DISCONTINUED | OUTPATIENT
Start: 2024-01-19 | End: 2024-01-24 | Stop reason: HOSPADM

## 2024-01-19 RX ORDER — FERROUS SULFATE 325(65) MG
325 TABLET ORAL
Status: DISCONTINUED | OUTPATIENT
Start: 2024-01-20 | End: 2024-01-24 | Stop reason: HOSPADM

## 2024-01-19 RX ORDER — POLYETHYLENE GLYCOL 3350 17 G/17G
17 POWDER, FOR SOLUTION ORAL DAILY PRN
Status: DISCONTINUED | OUTPATIENT
Start: 2024-01-19 | End: 2024-01-24 | Stop reason: HOSPADM

## 2024-01-19 RX ORDER — ONDANSETRON 4 MG/1
4 TABLET, FILM COATED ORAL EVERY 8 HOURS PRN
Status: DISCONTINUED | OUTPATIENT
Start: 2024-01-19 | End: 2024-01-24 | Stop reason: HOSPADM

## 2024-01-19 RX ORDER — PANTOPRAZOLE SODIUM 40 MG/1
40 TABLET, DELAYED RELEASE ORAL DAILY
Status: DISCONTINUED | OUTPATIENT
Start: 2024-01-19 | End: 2024-01-24 | Stop reason: HOSPADM

## 2024-01-19 RX ORDER — POLYETHYLENE GLYCOL 3350 17 G
2 POWDER IN PACKET (EA) ORAL
Status: DISCONTINUED | OUTPATIENT
Start: 2024-01-19 | End: 2024-01-24 | Stop reason: HOSPADM

## 2024-01-19 RX ORDER — SERTRALINE HYDROCHLORIDE 100 MG/1
100 TABLET, FILM COATED ORAL DAILY
Status: DISCONTINUED | OUTPATIENT
Start: 2024-01-19 | End: 2024-01-20

## 2024-01-19 RX ADMIN — BUDESONIDE AND FORMOTEROL FUMARATE DIHYDRATE 2 PUFF: 160; 4.5 AEROSOL RESPIRATORY (INHALATION) at 22:40

## 2024-01-19 RX ADMIN — TIOTROPIUM BROMIDE INHALATION SPRAY 2 PUFF: 3.12 SPRAY, METERED RESPIRATORY (INHALATION) at 18:09

## 2024-01-19 RX ADMIN — PANTOPRAZOLE SODIUM 40 MG: 40 TABLET, DELAYED RELEASE ORAL at 13:06

## 2024-01-19 RX ADMIN — TRAZODONE HYDROCHLORIDE 50 MG: 50 TABLET ORAL at 03:20

## 2024-01-19 RX ADMIN — HYDROXYZINE HYDROCHLORIDE 50 MG: 50 TABLET, FILM COATED ORAL at 22:40

## 2024-01-19 RX ADMIN — SERTRALINE 100 MG: 100 TABLET, FILM COATED ORAL at 13:31

## 2024-01-19 RX ADMIN — TRAZODONE HYDROCHLORIDE 50 MG: 50 TABLET ORAL at 22:40

## 2024-01-19 RX ADMIN — FOLIC ACID 1 MG: 1 TABLET ORAL at 13:06

## 2024-01-19 RX ADMIN — METOPROLOL TARTRATE 50 MG: 50 TABLET ORAL at 13:06

## 2024-01-19 RX ADMIN — HYDROCODONE BITARTRATE AND ACETAMINOPHEN 1 TABLET: 10; 325 TABLET ORAL at 17:24

## 2024-01-19 RX ADMIN — IBUPROFEN 400 MG: 400 TABLET, FILM COATED ORAL at 03:20

## 2024-01-19 RX ADMIN — HYDROCODONE BITARTRATE AND ACETAMINOPHEN 1 TABLET: 10; 325 TABLET ORAL at 22:40

## 2024-01-19 RX ADMIN — ASPIRIN 81 MG: 81 TABLET, COATED ORAL at 18:09

## 2024-01-19 RX ADMIN — HYDROCODONE BITARTRATE AND ACETAMINOPHEN 1 TABLET: 10; 325 TABLET ORAL at 13:05

## 2024-01-19 RX ADMIN — DIAZEPAM 5 MG: 5 TABLET ORAL at 22:40

## 2024-01-19 RX ADMIN — ATORVASTATIN CALCIUM 40 MG: 20 TABLET, FILM COATED ORAL at 22:40

## 2024-01-19 RX ADMIN — HYDROXYZINE HYDROCHLORIDE 50 MG: 50 TABLET, FILM COATED ORAL at 03:20

## 2024-01-19 ASSESSMENT — SLEEP AND FATIGUE QUESTIONNAIRES
DO YOU USE A SLEEP AID: YES
SLEEP PATTERN: DIFFICULTY FALLING ASLEEP;RESTLESSNESS
AVERAGE NUMBER OF SLEEP HOURS: 4
DO YOU HAVE DIFFICULTY SLEEPING: YES

## 2024-01-19 ASSESSMENT — PATIENT HEALTH QUESTIONNAIRE - PHQ9
1. LITTLE INTEREST OR PLEASURE IN DOING THINGS: 0
SUM OF ALL RESPONSES TO PHQ QUESTIONS 1-9: 6
3. TROUBLE FALLING OR STAYING ASLEEP: 1
4. FEELING TIRED OR HAVING LITTLE ENERGY: 1
6. FEELING BAD ABOUT YOURSELF - OR THAT YOU ARE A FAILURE OR HAVE LET YOURSELF OR YOUR FAMILY DOWN: 1
SUM OF ALL RESPONSES TO PHQ9 QUESTIONS 1 & 2: 1
2. FEELING DOWN, DEPRESSED OR HOPELESS: 1
8. MOVING OR SPEAKING SO SLOWLY THAT OTHER PEOPLE COULD HAVE NOTICED. OR THE OPPOSITE, BEING SO FIGETY OR RESTLESS THAT YOU HAVE BEEN MOVING AROUND A LOT MORE THAN USUAL: 0
SUM OF ALL RESPONSES TO PHQ QUESTIONS 1-9: 7
SUM OF ALL RESPONSES TO PHQ QUESTIONS 1-9: 7
9. THOUGHTS THAT YOU WOULD BE BETTER OFF DEAD, OR OF HURTING YOURSELF: 1
7. TROUBLE CONCENTRATING ON THINGS, SUCH AS READING THE NEWSPAPER OR WATCHING TELEVISION: 1
10. IF YOU CHECKED OFF ANY PROBLEMS, HOW DIFFICULT HAVE THESE PROBLEMS MADE IT FOR YOU TO DO YOUR WORK, TAKE CARE OF THINGS AT HOME, OR GET ALONG WITH OTHER PEOPLE: 1
SUM OF ALL RESPONSES TO PHQ QUESTIONS 1-9: 7
5. POOR APPETITE OR OVEREATING: 1

## 2024-01-19 ASSESSMENT — PAIN DESCRIPTION - ORIENTATION
ORIENTATION: LOWER

## 2024-01-19 ASSESSMENT — PAIN SCALES - GENERAL
PAINLEVEL_OUTOF10: 5
PAINLEVEL_OUTOF10: 9

## 2024-01-19 ASSESSMENT — PAIN - FUNCTIONAL ASSESSMENT
PAIN_FUNCTIONAL_ASSESSMENT: PREVENTS OR INTERFERES SOME ACTIVE ACTIVITIES AND ADLS
PAIN_FUNCTIONAL_ASSESSMENT: PREVENTS OR INTERFERES SOME ACTIVE ACTIVITIES AND ADLS

## 2024-01-19 ASSESSMENT — PAIN DESCRIPTION - PAIN TYPE
TYPE: CHRONIC PAIN
TYPE: CHRONIC PAIN

## 2024-01-19 ASSESSMENT — PAIN DESCRIPTION - DESCRIPTORS
DESCRIPTORS: ACHING;SHARP
DESCRIPTORS: ACHING
DESCRIPTORS: ACHING;CRUSHING;DISCOMFORT

## 2024-01-19 ASSESSMENT — PAIN DESCRIPTION - LOCATION
LOCATION: BACK

## 2024-01-19 NOTE — GROUP NOTE
Group Therapy Note    Date: 1/19/2024    Group Start Time: 1330  Group End Time: 1420  Group Topic: Cognitive Skills    Marisa Gunter CTRS        Group Therapy Note    Attendees: 10/15     Topic: To increase social interaction, visual problem solving, and communication skills.    Patient did not participate in Cognitive Skills Group at 11:00, despite staff encouragement and explanation of   benefits. Patient remains seclusive to self and room.   Q15 minute safety checks maintained for patient safety and will continue to encourage patient to attend unit   programming.         Discipline Responsible: Psychoeducational Specialist    Signature:  UMAIR ARIAS

## 2024-01-19 NOTE — GROUP NOTE
Group Therapy Note     Date: 1/19/2024     Group Start Time: 1430  Group End Time: 1500  Group Topic: Community Meeting     Debbie Vargas LPN           Group Therapy Note     Attendees: 11/16     Patient was able to appropriately participate in the group activity of \"Would You Rather.\" The objective is for the group to spark conversation and discover/discuss different viewpoints.      Status After Intervention:  Improved     Participation Level: Active Listener and Interactive     Participation Quality: Appropriate, Sharing, and Supportive     Modes of Intervention: Support, Socialization, and Activity        Discipline Responsible: Licensed Practical Nurse        Signature:  Debbie Paez LPN

## 2024-01-19 NOTE — ED NOTES
Patient demanding to leave \"You haven't done a single thing for me and I've been here for 7 hours\" Explained to the patient that she is pinked slipped and is unable to leave. She will be transferred to a psychiatric facility when a bed is available to be evaluated by a psychiatrist. Patient confrontational and demanding to talk to someone \"important\" Informed the patient that I am the supervisor and that I am the one that she needs to take to. She then states \"get the fuck out of my room\"    Surgical Case request sent to Endo today to add EGD, also added on in Epic.     Scheduled for:  [id number] COLONOSCOPY [id number]  Provider Name:  Dr Donn Haq  Date:  06/24/2020  Location:  [facility]  Sedation:  MAC  Time:  2:45pm (pt is aware to arrive at 1:45pm)     Prep

## 2024-01-19 NOTE — BH NOTE
[facility]  Admission Note     Admission Type:   Involuntary - Signed in Upon Admission    Reason for admission:  Reason for Admission: Involuntary patient from [facility] reporting suicidal ideation to overdose on medications of get hit by a train related to an increase of depression. Patient states death of several family members of the last year as the trigger for her depression. She has a history of previous inpatient treatment and reports medication-compliance.      Addictive Behavior:   Addictive Behavior  In the Past 3 Months, Have You Felt or Has Someone Told You That You Have a Problem With  : None    Medical Problems:   Past Medical History:   Diagnosis Date    Arthritis     Asthma     COPD (chronic obstructive pulmonary disease) ([facility])     Eczema of face     Genital warts     Hypertension     Psychiatric problem     Restless legs     Sleep apnea     Stroke ([facility])        Status EXAM:  Mental Status and Behavioral Exam  Normal: No  Level of Assistance: Independent/Self  Facial Expression: Flat, Sad  Affect: Blunt  Level of Consciousness: Alert  Frequency of Checks: 4 times per hour, close  Mood:Normal: No  Mood: Depressed, Anxious, Helpless, Sad  Motor Activity:Normal: No  Motor Activity: Decreased  Eye Contact: Fair  Observed Behavior: Preoccupied, Withdrawn, Cooperative, Guarded  Sexual Misconduct History: Current - no  Preception: [place] to person, [place] to time, [place] to place, [place] to situation  Attention:Normal: No  Attention: Distractible  Thought Processes: Circumstantial  Thought Content:Normal: No  Thought Content: Preoccupations  Depression Symptoms: Impaired concentration, Loss of interest, Change in energy level, Feelings of helplessness, Feelings of hopelessess  Anxiety Symptoms: Generalized  Dilcia Symptoms: No problems reported or observed.  Hallucinations: None  Delusions: No  Memory:Normal: Yes  Insight and Judgment: No  Insight and Judgment: Poor judgment, Poor

## 2024-01-19 NOTE — H&P
Department of Psychiatry  Attending Physician Psychiatric Assessment     Reason for Admission to Psychiatric Unit:  Threat to self requiring 24 hour professional observation  Concerns about patient's safety in the community    CHIEF COMPLAINT: Suicidal ideation    History obtained from: Patient, electronic medical record          HISTORY OF PRESENT ILLNESS:    Tanisha Coleman is a 58 y.o. female who has a past medical history of bipolar disorder. Patient presented to the ED with depression and suicidal ideation.  Patient was initially involuntary from [facility] \"reporting suicidal ideation to overdose on medications of get hit by a train related to an increase of depression.\" When approached, patient accepted need for privacy.  Interview occurred privately in patients room.  Patient states she has been severely depressed lately and states main stressor is her son and her son's girlfriend moving into her apartment.  Patient states she does not get along with a girlfriend and wants him to move out however she got pregnant and now they decided they are not leaving until they can afford a house.  Patient also endorses significant stressor is her mood \"being all over the place\".  Patient states she becomes acutely angry, begins crying and then 30 minutes later is acting like nothing happened.  Patient reports this is happening with more people than just her son and girlfriend.  Patient reports \"everything\" is leading to suicidal ideation.  When discussing depression patient reports a decrease in sleep, interest, concentration, fluctuation in appetite and fluctuation in energy.  Patient reports struggling with guilt and worthlessness.  Patient endorses 1 previous suicide attempt. At this time, the patient is not able to contract for safety outside the hospital and is not appropriate for a lower level of care. There is risk of decompensation and patient warrants further hospitalization for safety and 
occurred.

## 2024-01-19 NOTE — GROUP NOTE
Group Therapy Note    Date: 1/19/2024    Group Start Time: 1100  Group End Time: 1145  Group Topic: Cognitive Skills    Marisa Gunter CTRS        Group Therapy Note    Attendees: 10/17     Topic: To increase social interaction, problem solving, and communication skills .      Patient did not participate in Cognitive Skills Group at 11:00, despite staff encouragement and explanation of   benefits. Patient remains seclusive to self and room.    Q15 minute safety checks maintained for patient safety and will continue to encourage patient to attend unit   programming.         Discipline Responsible: Psychoeducational Specialist    Signature:  UMAIR ARIAS

## 2024-01-20 PROCEDURE — 6370000000 HC RX 637 (ALT 250 FOR IP): Performed by: PSYCHIATRY & NEUROLOGY

## 2024-01-20 PROCEDURE — 6370000000 HC RX 637 (ALT 250 FOR IP): Performed by: NURSE PRACTITIONER

## 2024-01-20 PROCEDURE — 99232 SBSQ HOSP IP/OBS MODERATE 35: CPT | Performed by: PSYCHIATRY & NEUROLOGY

## 2024-01-20 PROCEDURE — 99232 SBSQ HOSP IP/OBS MODERATE 35: CPT | Performed by: INTERNAL MEDICINE

## 2024-01-20 PROCEDURE — APPSS30 APP SPLIT SHARED TIME 16-30 MINUTES

## 2024-01-20 PROCEDURE — 6370000000 HC RX 637 (ALT 250 FOR IP): Performed by: INTERNAL MEDICINE

## 2024-01-20 PROCEDURE — 6370000000 HC RX 637 (ALT 250 FOR IP)

## 2024-01-20 PROCEDURE — 1240000000 HC EMOTIONAL WELLNESS R&B

## 2024-01-20 RX ORDER — TRAZODONE HYDROCHLORIDE 100 MG/1
100 TABLET ORAL NIGHTLY PRN
Status: DISCONTINUED | OUTPATIENT
Start: 2024-01-20 | End: 2024-01-24 | Stop reason: HOSPADM

## 2024-01-20 RX ADMIN — HYDROCODONE BITARTRATE AND ACETAMINOPHEN 1 TABLET: 10; 325 TABLET ORAL at 14:05

## 2024-01-20 RX ADMIN — HYDROCODONE BITARTRATE AND ACETAMINOPHEN 1 TABLET: 10; 325 TABLET ORAL at 08:53

## 2024-01-20 RX ADMIN — HYDROXYZINE HYDROCHLORIDE 50 MG: 50 TABLET, FILM COATED ORAL at 08:53

## 2024-01-20 RX ADMIN — SERTRALINE 100 MG: 100 TABLET, FILM COATED ORAL at 08:31

## 2024-01-20 RX ADMIN — FERROUS SULFATE TAB 325 MG (65 MG ELEMENTAL FE) 325 MG: 325 (65 FE) TAB at 08:31

## 2024-01-20 RX ADMIN — ROPINIROLE HYDROCHLORIDE 4 MG: 2 TABLET, FILM COATED ORAL at 22:45

## 2024-01-20 RX ADMIN — ATORVASTATIN CALCIUM 40 MG: 20 TABLET, FILM COATED ORAL at 22:45

## 2024-01-20 RX ADMIN — HYDROXYZINE HYDROCHLORIDE 50 MG: 50 TABLET, FILM COATED ORAL at 22:45

## 2024-01-20 RX ADMIN — ROPINIROLE HYDROCHLORIDE 4 MG: 2 TABLET, FILM COATED ORAL at 03:06

## 2024-01-20 RX ADMIN — TIOTROPIUM BROMIDE INHALATION SPRAY 2 PUFF: 3.12 SPRAY, METERED RESPIRATORY (INHALATION) at 08:29

## 2024-01-20 RX ADMIN — HYDROCODONE BITARTRATE AND ACETAMINOPHEN 1 TABLET: 10; 325 TABLET ORAL at 02:51

## 2024-01-20 RX ADMIN — BUDESONIDE AND FORMOTEROL FUMARATE DIHYDRATE 2 PUFF: 160; 4.5 AEROSOL RESPIRATORY (INHALATION) at 22:45

## 2024-01-20 RX ADMIN — FOLIC ACID 1 MG: 1 TABLET ORAL at 08:30

## 2024-01-20 RX ADMIN — BUDESONIDE AND FORMOTEROL FUMARATE DIHYDRATE 2 PUFF: 160; 4.5 AEROSOL RESPIRATORY (INHALATION) at 08:29

## 2024-01-20 RX ADMIN — TRAZODONE HYDROCHLORIDE 100 MG: 100 TABLET ORAL at 23:24

## 2024-01-20 RX ADMIN — PANTOPRAZOLE SODIUM 40 MG: 40 TABLET, DELAYED RELEASE ORAL at 08:31

## 2024-01-20 RX ADMIN — HYDROCODONE BITARTRATE AND ACETAMINOPHEN 1 TABLET: 10; 325 TABLET ORAL at 20:54

## 2024-01-20 RX ADMIN — ROPINIROLE HYDROCHLORIDE 4 MG: 2 TABLET, FILM COATED ORAL at 00:56

## 2024-01-20 RX ADMIN — DIAZEPAM 5 MG: 5 TABLET ORAL at 08:31

## 2024-01-20 RX ADMIN — ASPIRIN 81 MG: 81 TABLET, COATED ORAL at 08:31

## 2024-01-20 ASSESSMENT — PAIN SCALES - GENERAL
PAINLEVEL_OUTOF10: 5
PAINLEVEL_OUTOF10: 5
PAINLEVEL_OUTOF10: 9
PAINLEVEL_OUTOF10: 9
PAINLEVEL_OUTOF10: 10

## 2024-01-20 ASSESSMENT — PAIN DESCRIPTION - ORIENTATION
ORIENTATION: LOWER
ORIENTATION: RIGHT;LEFT
ORIENTATION: RIGHT;LEFT;UPPER;LOWER;MID

## 2024-01-20 ASSESSMENT — PAIN DESCRIPTION - LOCATION
LOCATION: BACK
LOCATION: BACK;HIP
LOCATION: BACK;HIP

## 2024-01-20 ASSESSMENT — PAIN - FUNCTIONAL ASSESSMENT
PAIN_FUNCTIONAL_ASSESSMENT: PREVENTS OR INTERFERES SOME ACTIVE ACTIVITIES AND ADLS
PAIN_FUNCTIONAL_ASSESSMENT: ACTIVITIES ARE NOT PREVENTED

## 2024-01-20 ASSESSMENT — PAIN DESCRIPTION - DESCRIPTORS: DESCRIPTORS: ACHING;SHARP

## 2024-01-20 ASSESSMENT — PAIN DESCRIPTION - PAIN TYPE: TYPE: CHRONIC PAIN

## 2024-01-20 NOTE — GROUP NOTE
Group Therapy Note    Date: 1/20/2024    Group Start Time: 1315  Group End Time: 1415  Group Topic: Relaxation    [facility] Nani Negro CTRS        Group Therapy Note    Attendees: 11/16       Patient's Goal:  pt will identify benefits of using art for leisure and coping    Notes:  pt was pleasant and participated well    Status After Intervention:  Improved    Participation Level: Active Listener and Interactive    Participation Quality: Appropriate, Attentive, and Sharing      Speech:  normal      Thought Process/Content: Logical      Affective Functioning: Congruent      Mood: euthymic      Level of consciousness:  Alert      Response to Learning: Able to verbalize current knowledge/experience, Capable of insight, and Progressing to goal      Endings: None Reported    Modes of Intervention: Education, Support, and Activity      Discipline Responsible: Psychoeducational Specialist      Signature:  UMAIR PINEDO

## 2024-01-20 NOTE — GROUP NOTE
Group Therapy Note    Date: 1/19/2024    Group Start Time: 2025  Group End Time: 2100  Group Topic: Wrap-Up    Sabrina Perez, RN      Group Therapy Note    Attendees: 11/15      Patient's Goal:    1.  To be able to reflect on daily unit activities/experiences.  2.  To review accomplished daily goals and be encouraged to set new goals for the next day.  3.  To improve interpersonal interaction through socialization.     Notes:  Pt attended and actively participated in Wrap-Up/Goal Review group this evening.     Status After Intervention:  Improved     Participation Level: Active Listener and Interactive     Participation Quality: Appropriate, Attentive and Sharing     Speech:  normal     Thought Process/Content: Logical     Affective Functioning: Congruent     Mood: euthymic     Level of consciousness:  Alert, Oriented x4 and Attentive     Response to Learning: Able to verbalize current knowledge/experience, Able to verbalize/acknowledge new learning, Progressing to goal     Endings: None Reported     Modes of Intervention: Education, Support and Socialization     Discipline Responsible: Registered Nurse        Signature:  Sabrina Payne, RN

## 2024-01-21 LAB
CHOLEST SERPL-MCNC: 138 MG/DL
CHOLESTEROL/HDL RATIO: 4.9
ERYTHROCYTE [DISTWIDTH] IN BLOOD BY AUTOMATED COUNT: 14 % (ref 11.5–14.9)
HCT VFR BLD AUTO: 38.3 % (ref 36–46)
HDLC SERPL-MCNC: 28 MG/DL
HGB BLD-MCNC: 12.6 G/DL (ref 12–16)
LDLC SERPL CALC-MCNC: 53 MG/DL (ref 0–130)
MCH RBC QN AUTO: 29.4 PG (ref 26–34)
MCHC RBC AUTO-ENTMCNC: 32.8 G/DL (ref 31–37)
MCV RBC AUTO: 89.6 FL (ref 80–100)
PLATELET # BLD AUTO: 184 K/UL (ref 150–450)
PMV BLD AUTO: 8.6 FL (ref 6–12)
RBC # BLD AUTO: 4.28 M/UL (ref 4–5.2)
TRIGL SERPL-MCNC: 284 MG/DL
WBC OTHER # BLD: 8.9 K/UL (ref 3.5–11)

## 2024-01-21 PROCEDURE — APPSS30 APP SPLIT SHARED TIME 16-30 MINUTES

## 2024-01-21 PROCEDURE — 36415 COLL VENOUS BLD VENIPUNCTURE: CPT

## 2024-01-21 PROCEDURE — 80061 LIPID PANEL: CPT

## 2024-01-21 PROCEDURE — 6370000000 HC RX 637 (ALT 250 FOR IP): Performed by: PSYCHIATRY & NEUROLOGY

## 2024-01-21 PROCEDURE — 99232 SBSQ HOSP IP/OBS MODERATE 35: CPT | Performed by: PSYCHIATRY & NEUROLOGY

## 2024-01-21 PROCEDURE — 6370000000 HC RX 637 (ALT 250 FOR IP): Performed by: INTERNAL MEDICINE

## 2024-01-21 PROCEDURE — 85027 COMPLETE CBC AUTOMATED: CPT

## 2024-01-21 PROCEDURE — 6370000000 HC RX 637 (ALT 250 FOR IP): Performed by: NURSE PRACTITIONER

## 2024-01-21 PROCEDURE — 1240000000 HC EMOTIONAL WELLNESS R&B

## 2024-01-21 RX ORDER — SERTRALINE HYDROCHLORIDE 100 MG/1
200 TABLET, FILM COATED ORAL DAILY
Status: DISCONTINUED | OUTPATIENT
Start: 2024-01-22 | End: 2024-01-24 | Stop reason: HOSPADM

## 2024-01-21 RX ADMIN — METOPROLOL TARTRATE 50 MG: 50 TABLET ORAL at 10:29

## 2024-01-21 RX ADMIN — TIOTROPIUM BROMIDE INHALATION SPRAY 2 PUFF: 3.12 SPRAY, METERED RESPIRATORY (INHALATION) at 10:28

## 2024-01-21 RX ADMIN — FOLIC ACID 1 MG: 1 TABLET ORAL at 10:29

## 2024-01-21 RX ADMIN — PANTOPRAZOLE SODIUM 40 MG: 40 TABLET, DELAYED RELEASE ORAL at 10:29

## 2024-01-21 RX ADMIN — SENNOSIDES 8.6 MG: 8.6 TABLET, FILM COATED ORAL at 11:02

## 2024-01-21 RX ADMIN — ATORVASTATIN CALCIUM 40 MG: 20 TABLET, FILM COATED ORAL at 20:52

## 2024-01-21 RX ADMIN — HYDROXYZINE HYDROCHLORIDE 50 MG: 50 TABLET, FILM COATED ORAL at 20:52

## 2024-01-21 RX ADMIN — BUDESONIDE AND FORMOTEROL FUMARATE DIHYDRATE 2 PUFF: 160; 4.5 AEROSOL RESPIRATORY (INHALATION) at 20:52

## 2024-01-21 RX ADMIN — FERROUS SULFATE TAB 325 MG (65 MG ELEMENTAL FE) 325 MG: 325 (65 FE) TAB at 10:29

## 2024-01-21 RX ADMIN — HYDROCODONE BITARTRATE AND ACETAMINOPHEN 1 TABLET: 10; 325 TABLET ORAL at 05:32

## 2024-01-21 RX ADMIN — HYDROCODONE BITARTRATE AND ACETAMINOPHEN 1 TABLET: 10; 325 TABLET ORAL at 20:55

## 2024-01-21 RX ADMIN — HYDROCODONE BITARTRATE AND ACETAMINOPHEN 1 TABLET: 10; 325 TABLET ORAL at 10:29

## 2024-01-21 RX ADMIN — SERTRALINE HYDROCHLORIDE 150 MG: 50 TABLET ORAL at 10:29

## 2024-01-21 RX ADMIN — ASPIRIN 81 MG: 81 TABLET, COATED ORAL at 10:29

## 2024-01-21 RX ADMIN — BUDESONIDE AND FORMOTEROL FUMARATE DIHYDRATE 2 PUFF: 160; 4.5 AEROSOL RESPIRATORY (INHALATION) at 10:28

## 2024-01-21 RX ADMIN — HYDROCODONE BITARTRATE AND ACETAMINOPHEN 1 TABLET: 10; 325 TABLET ORAL at 16:20

## 2024-01-21 RX ADMIN — ROPINIROLE HYDROCHLORIDE 4 MG: 2 TABLET, FILM COATED ORAL at 20:51

## 2024-01-21 ASSESSMENT — PAIN DESCRIPTION - LOCATION
LOCATION: BACK;HIP
LOCATION: BACK
LOCATION: BACK;HIP
LOCATION: BACK

## 2024-01-21 ASSESSMENT — PAIN SCALES - GENERAL
PAINLEVEL_OUTOF10: 5
PAINLEVEL_OUTOF10: 9
PAINLEVEL_OUTOF10: 1
PAINLEVEL_OUTOF10: 8
PAINLEVEL_OUTOF10: 8
PAINLEVEL_OUTOF10: 9
PAINLEVEL_OUTOF10: 1

## 2024-01-21 ASSESSMENT — PAIN - FUNCTIONAL ASSESSMENT
PAIN_FUNCTIONAL_ASSESSMENT: ACTIVITIES ARE NOT PREVENTED
PAIN_FUNCTIONAL_ASSESSMENT: ACTIVITIES ARE NOT PREVENTED

## 2024-01-21 ASSESSMENT — PAIN DESCRIPTION - DESCRIPTORS
DESCRIPTORS: DISCOMFORT;ACHING
DESCRIPTORS: DISCOMFORT

## 2024-01-21 NOTE — GROUP NOTE
Group Therapy Note    Date: 1/21/2024    Group Start Time: 1330  Group End Time: 1415  Group Topic: Relaxation    [facility] Nani Negro CTRS    Relaxation Group    Attendees 10/18       Patient's Goal:  pt will identify benefits of using art for relaxation and coping     Notes:  pt was pleasant and participated well    Status After Intervention:  Improved    Participation Level: Active Listener and Interactive    Participation Quality: Appropriate      Speech:  normal      Thought Process/Content: Logical      Affective Functioning: Congruent      Mood: euthymic      Level of consciousness:  Alert      Response to Learning: Able to verbalize current knowledge/experience and Capable of insight      Endings: None Reported    Modes of Intervention: Support, Socialization, and Activity      Discipline Responsible: Psychoeducational Specialist      Signature:  UMAIR PINEDO

## 2024-01-21 NOTE — GROUP NOTE
Group Therapy Note    Date: 1/21/2024    Group Start Time: 1030  Group End Time: 1045  Group Topic: Psychoeducation    [facility] BHSoo Flores LSW        Group Therapy Note    Attendees: 10/17       Patient's Goal:  using grounding/mindfulness to manage symptoms of anxiety     Notes:  large group attempted but ended due to inappropriate/agitated behavior by 2 group members, pt approached Sw after group and expressed anxiety over group session, SW offered support, reassurance     Status After Intervention:  Improved    Participation Level: Minimal    Participation Quality: Attentive      Speech:  normal      Thought Process/Content: Logical      Affective Functioning: Congruent      Mood: anxious      Level of consciousness:  Alert and Oriented x4      Response to Learning: Progressing to goal      Endings: None Reported    Modes of Intervention: Education and Support      Discipline Responsible: /Counselor      Signature:  SHIN Hagen

## 2024-01-22 PROCEDURE — 6370000000 HC RX 637 (ALT 250 FOR IP): Performed by: PSYCHIATRY & NEUROLOGY

## 2024-01-22 PROCEDURE — 6370000000 HC RX 637 (ALT 250 FOR IP): Performed by: NURSE PRACTITIONER

## 2024-01-22 PROCEDURE — 6370000000 HC RX 637 (ALT 250 FOR IP)

## 2024-01-22 PROCEDURE — 99232 SBSQ HOSP IP/OBS MODERATE 35: CPT | Performed by: PSYCHIATRY & NEUROLOGY

## 2024-01-22 PROCEDURE — APPSS30 APP SPLIT SHARED TIME 16-30 MINUTES

## 2024-01-22 PROCEDURE — 1240000000 HC EMOTIONAL WELLNESS R&B

## 2024-01-22 PROCEDURE — 6370000000 HC RX 637 (ALT 250 FOR IP): Performed by: INTERNAL MEDICINE

## 2024-01-22 RX ADMIN — ROPINIROLE HYDROCHLORIDE 4 MG: 2 TABLET, FILM COATED ORAL at 21:56

## 2024-01-22 RX ADMIN — TIOTROPIUM BROMIDE INHALATION SPRAY 2 PUFF: 3.12 SPRAY, METERED RESPIRATORY (INHALATION) at 08:59

## 2024-01-22 RX ADMIN — FOLIC ACID 1 MG: 1 TABLET ORAL at 09:00

## 2024-01-22 RX ADMIN — HYDROCODONE BITARTRATE AND ACETAMINOPHEN 1 TABLET: 10; 325 TABLET ORAL at 09:00

## 2024-01-22 RX ADMIN — HYDROCODONE BITARTRATE AND ACETAMINOPHEN 1 TABLET: 10; 325 TABLET ORAL at 17:51

## 2024-01-22 RX ADMIN — METOPROLOL TARTRATE 50 MG: 50 TABLET ORAL at 08:59

## 2024-01-22 RX ADMIN — HYDROCODONE BITARTRATE AND ACETAMINOPHEN 1 TABLET: 10; 325 TABLET ORAL at 01:57

## 2024-01-22 RX ADMIN — SERTRALINE 200 MG: 100 TABLET, FILM COATED ORAL at 09:01

## 2024-01-22 RX ADMIN — HYDROCODONE BITARTRATE AND ACETAMINOPHEN 1 TABLET: 10; 325 TABLET ORAL at 21:56

## 2024-01-22 RX ADMIN — PANTOPRAZOLE SODIUM 40 MG: 40 TABLET, DELAYED RELEASE ORAL at 09:00

## 2024-01-22 RX ADMIN — ASPIRIN 81 MG: 81 TABLET, COATED ORAL at 08:59

## 2024-01-22 RX ADMIN — TRAZODONE HYDROCHLORIDE 100 MG: 100 TABLET ORAL at 21:56

## 2024-01-22 RX ADMIN — HYDROCODONE BITARTRATE AND ACETAMINOPHEN 1 TABLET: 10; 325 TABLET ORAL at 14:13

## 2024-01-22 RX ADMIN — BUDESONIDE AND FORMOTEROL FUMARATE DIHYDRATE 2 PUFF: 160; 4.5 AEROSOL RESPIRATORY (INHALATION) at 08:59

## 2024-01-22 RX ADMIN — BUDESONIDE AND FORMOTEROL FUMARATE DIHYDRATE 2 PUFF: 160; 4.5 AEROSOL RESPIRATORY (INHALATION) at 21:55

## 2024-01-22 RX ADMIN — HYDROXYZINE HYDROCHLORIDE 50 MG: 50 TABLET, FILM COATED ORAL at 21:56

## 2024-01-22 RX ADMIN — ATORVASTATIN CALCIUM 40 MG: 20 TABLET, FILM COATED ORAL at 21:56

## 2024-01-22 RX ADMIN — FERROUS SULFATE TAB 325 MG (65 MG ELEMENTAL FE) 325 MG: 325 (65 FE) TAB at 08:59

## 2024-01-22 RX ADMIN — IBUPROFEN 400 MG: 400 TABLET, FILM COATED ORAL at 19:39

## 2024-01-22 ASSESSMENT — PAIN DESCRIPTION - ORIENTATION
ORIENTATION: RIGHT;LEFT
ORIENTATION: RIGHT;LEFT;LOWER
ORIENTATION: RIGHT;LEFT;LOWER

## 2024-01-22 ASSESSMENT — PAIN SCALES - GENERAL
PAINLEVEL_OUTOF10: 8
PAINLEVEL_OUTOF10: 4
PAINLEVEL_OUTOF10: 3
PAINLEVEL_OUTOF10: 8
PAINLEVEL_OUTOF10: 5
PAINLEVEL_OUTOF10: 9
PAINLEVEL_OUTOF10: 8
PAINLEVEL_OUTOF10: 9
PAINLEVEL_OUTOF10: 4

## 2024-01-22 ASSESSMENT — PAIN - FUNCTIONAL ASSESSMENT
PAIN_FUNCTIONAL_ASSESSMENT: ACTIVITIES ARE NOT PREVENTED
PAIN_FUNCTIONAL_ASSESSMENT: PREVENTS OR INTERFERES SOME ACTIVE ACTIVITIES AND ADLS
PAIN_FUNCTIONAL_ASSESSMENT: PREVENTS OR INTERFERES SOME ACTIVE ACTIVITIES AND ADLS

## 2024-01-22 ASSESSMENT — PAIN DESCRIPTION - DESCRIPTORS
DESCRIPTORS: SHARP
DESCRIPTORS: SHARP
DESCRIPTORS: DISCOMFORT;ACHING

## 2024-01-22 ASSESSMENT — PAIN DESCRIPTION - LOCATION
LOCATION: BACK;HIP

## 2024-01-22 NOTE — GROUP NOTE
Group Therapy Note    Date: 1/22/2024    Group Start Time: 0900  Group End Time: 1000  Group Topic: Group Documentation    Glenna Guidry        Group Therapy Note    Attendees: 13/19           Patient's Goal:  be honest about her feelings    Notes:  Morning goal group session, patient has opportunity to reflect on what they need to accomplish to achieve discharge, and decide on a step towards that that they would like to accomplish by the end of the day today.     Status After Intervention:  Improved    Participation Level: Active Listener and Interactive    Participation Quality: Appropriate, Attentive, Sharing, and Supportive      Speech:  normal      Thought Process/Content: Logical  Linear      Affective Functioning: Congruent      Mood: euthymic      Level of consciousness:  Alert, Oriented x4, and Attentive      Response to Learning: Capable of insight, Able to change behavior, and Progressing to goal      Endings: None Reported    Modes of Intervention: Support and Socialization      Discipline Responsible: Behavorial Health Tech      Signature:  Glenna Negron

## 2024-01-22 NOTE — GROUP NOTE
Group Therapy Note    Date: 1/22/2024    Group Start Time: 1100  Group End Time: 1145  Group Topic: Cognitive Skills    [facility] Nani Negro CTRS        Group Therapy Note    Attendees: 12/19       Patient's Goal:  pt will demonstrate improved coping skills and improved interpersonal relationships    Notes:  pt was pleasant and participated well    Status After Intervention:  Improved    Participation Level: Active Listener and Interactive    Participation Quality: Appropriate and Supportive      Speech:  normal      Thought Process/Content: Logical      Affective Functioning: Congruent      Mood: euthymic      Level of consciousness:  Alert      Response to Learning: Able to verbalize current knowledge/experience, Capable of insight, and Progressing to goal      Endings: None Reported    Modes of Intervention: Education, Support, and Activity      Discipline Responsible: Psychoeducational Specialist      Signature:  UMAIR PINEDO

## 2024-01-22 NOTE — GROUP NOTE
Group Therapy Note    Date: 1/22/2024    Group Start Time: 1330  Group End Time: 1445  Group Topic: recreation therapy group     [facility]    Nani Cm CTRS        Group Therapy Note    Attendees: 14/19       Patient's Goal:  pt will demonstrate improved improved leisure awareness and improved interpersonal relationships     Notes:  pt was pleasant and participated well    Status After Intervention:  Improved    Participation Level: Active Listener and Interactive    Participation Quality: Appropriate and Supportive      Speech:  normal      Thought Process/Content: Logical      Affective Functioning: Congruent      Mood: euthymic      Level of consciousness:  Alert      Response to Learning: Able to verbalize current knowledge/experience, Capable of insight, and Progressing to goal      Endings: None Reported    Modes of Intervention: Education, Support, and Activity      Discipline Responsible: Psychoeducational Specialist      Signature:  UMAIR PINEDO

## 2024-01-22 NOTE — GROUP NOTE
Group Therapy Note    Date: 1/22/2024    Group Start Time: 1015  Group End Time: 1045  Group Topic: Psychotherapy    Sreedhar Zamora        Group Therapy Note    Attendees: 10/19       Patient declined to attend psychotherapy group after encouragement from staff.  1:1 talk time offered but refused.     Signature:  Sreedhar Uribe

## 2024-01-23 ENCOUNTER — TELEPHONE (OUTPATIENT)
Dept: PRIMARY CARE CLINIC | Age: 59
End: 2024-01-23

## 2024-01-23 PROCEDURE — 6370000000 HC RX 637 (ALT 250 FOR IP): Performed by: PSYCHIATRY & NEUROLOGY

## 2024-01-23 PROCEDURE — 6370000000 HC RX 637 (ALT 250 FOR IP): Performed by: INTERNAL MEDICINE

## 2024-01-23 PROCEDURE — 99232 SBSQ HOSP IP/OBS MODERATE 35: CPT | Performed by: PSYCHIATRY & NEUROLOGY

## 2024-01-23 PROCEDURE — 1240000000 HC EMOTIONAL WELLNESS R&B

## 2024-01-23 PROCEDURE — APPSS30 APP SPLIT SHARED TIME 16-30 MINUTES

## 2024-01-23 RX ADMIN — FERROUS SULFATE TAB 325 MG (65 MG ELEMENTAL FE) 325 MG: 325 (65 FE) TAB at 08:07

## 2024-01-23 RX ADMIN — BUDESONIDE AND FORMOTEROL FUMARATE DIHYDRATE 2 PUFF: 160; 4.5 AEROSOL RESPIRATORY (INHALATION) at 20:52

## 2024-01-23 RX ADMIN — ROPINIROLE HYDROCHLORIDE 4 MG: 2 TABLET, FILM COATED ORAL at 20:50

## 2024-01-23 RX ADMIN — HYDROCODONE BITARTRATE AND ACETAMINOPHEN 1 TABLET: 10; 325 TABLET ORAL at 16:46

## 2024-01-23 RX ADMIN — ATORVASTATIN CALCIUM 40 MG: 20 TABLET, FILM COATED ORAL at 20:50

## 2024-01-23 RX ADMIN — HYDROCODONE BITARTRATE AND ACETAMINOPHEN 1 TABLET: 10; 325 TABLET ORAL at 20:49

## 2024-01-23 RX ADMIN — HYDROCODONE BITARTRATE AND ACETAMINOPHEN 1 TABLET: 10; 325 TABLET ORAL at 12:26

## 2024-01-23 RX ADMIN — TIOTROPIUM BROMIDE INHALATION SPRAY 2 PUFF: 3.12 SPRAY, METERED RESPIRATORY (INHALATION) at 08:06

## 2024-01-23 RX ADMIN — ASPIRIN 81 MG: 81 TABLET, COATED ORAL at 08:07

## 2024-01-23 RX ADMIN — FOLIC ACID 1 MG: 1 TABLET ORAL at 08:07

## 2024-01-23 RX ADMIN — SENNOSIDES 8.6 MG: 8.6 TABLET, FILM COATED ORAL at 08:07

## 2024-01-23 RX ADMIN — METOPROLOL TARTRATE 50 MG: 50 TABLET ORAL at 08:24

## 2024-01-23 RX ADMIN — HYDROCODONE BITARTRATE AND ACETAMINOPHEN 1 TABLET: 10; 325 TABLET ORAL at 02:52

## 2024-01-23 RX ADMIN — BUDESONIDE AND FORMOTEROL FUMARATE DIHYDRATE 2 PUFF: 160; 4.5 AEROSOL RESPIRATORY (INHALATION) at 08:05

## 2024-01-23 RX ADMIN — HYDROCODONE BITARTRATE AND ACETAMINOPHEN 1 TABLET: 10; 325 TABLET ORAL at 08:07

## 2024-01-23 RX ADMIN — PANTOPRAZOLE SODIUM 40 MG: 40 TABLET, DELAYED RELEASE ORAL at 08:07

## 2024-01-23 RX ADMIN — SERTRALINE 200 MG: 100 TABLET, FILM COATED ORAL at 08:07

## 2024-01-23 ASSESSMENT — PAIN SCALES - GENERAL
PAINLEVEL_OUTOF10: 8
PAINLEVEL_OUTOF10: 4
PAINLEVEL_OUTOF10: 9
PAINLEVEL_OUTOF10: 9

## 2024-01-23 ASSESSMENT — PAIN DESCRIPTION - LOCATION
LOCATION: BACK
LOCATION: GENERALIZED
LOCATION: BACK
LOCATION: BACK;HIP

## 2024-01-23 ASSESSMENT — PAIN DESCRIPTION - DESCRIPTORS
DESCRIPTORS: DISCOMFORT
DESCRIPTORS: SHARP

## 2024-01-23 ASSESSMENT — PAIN DESCRIPTION - ORIENTATION
ORIENTATION: LEFT;RIGHT
ORIENTATION: LOWER
ORIENTATION: LOWER

## 2024-01-23 ASSESSMENT — PAIN - FUNCTIONAL ASSESSMENT
PAIN_FUNCTIONAL_ASSESSMENT: NONE - DENIES PAIN
PAIN_FUNCTIONAL_ASSESSMENT: PREVENTS OR INTERFERES SOME ACTIVE ACTIVITIES AND ADLS

## 2024-01-23 NOTE — GROUP NOTE
Group Therapy Note    Date: 1/23/2024    Group Start Time: 1000  Group End Time: 1045  Group Topic: Group Therapy    Abril Carmichael LPN        Group Therapy Note    Attendees: 13/20    Patient has attended discharge planning and safety planning group.     Signature:  Abril Moe LPN

## 2024-01-23 NOTE — GROUP NOTE
Group Therapy Note    Date: 1/23/2024    Group Start Time: 0900  Group End Time: 0915  Group Topic: Community Meeting    Nimco Balbuena        Group Therapy Note    Attendees: 12/20       Patient's Goal:   Discharge planning     Status After Intervention:  Improved    Participation Level: Active Listener and Interactive    Participation Quality: Appropriate and Attentive      Speech:  normal      Thought Process/Content: Logical      Affective Functioning: Congruent      Mood: euthymic      Level of consciousness:  Alert and Oriented x4      Response to Learning: Able to verbalize current knowledge/experience and Able to verbalize/acknowledge new learning      Endings: None Reported    Modes of Intervention: Education and Support      Discipline Responsible: Behavorial Health Tech      Signature:  Nimco Ibrahim

## 2024-01-23 NOTE — GROUP NOTE
Group Therapy Note    Date: 1/23/2024    Group Start Time: 1100  Group End Time: 1140  Group Topic: Cognitive Skills    [facility] Nani Negro CTRS        Group Therapy Note    Attendees: 13/20       Patient's Goal:  pt will demonstrate improved coping skills and improved decision making skills     Notes:  pt was pleasant and participated well    Status After Intervention:  Improved    Participation Level: Active Listener and Interactive    Participation Quality: Appropriate and Supportive      Speech:  normal      Thought Process/Content: Logical      Affective Functioning: Congruent      Mood: euthymic      Level of consciousness:  Alert      Response to Learning: Able to verbalize current knowledge/experience, Capable of insight, and Progressing to goal      Endings: None Reported    Modes of Intervention: Support and Activity      Discipline Responsible: Psychoeducational Specialist      Signature:  UMAIR PINEDO

## 2024-01-23 NOTE — TELEPHONE ENCOUNTER
[facility] Transitions Initial Follow Up Call    Outreach made within 2 business days of discharge: Yes    Patient: Tanisha Coleman Patient : 1965   MRN: [id number]  Reason for Admission: There are no discharge diagnoses documented for the most recent discharge.  Discharge Date: 24       Spoke with: sana for patient     Discharge department/facility: [facility]     TCM Interactive Patient Contact:  Was patient able to fill all prescriptions:   Was patient instructed to bring all medications to the follow-up visit:   Is patient taking all medications as directed in the discharge summary?   Does patient understand their discharge instructions:   Does patient have questions or concerns that need addressed prior to 7-14 day follow up office visit:     Scheduled appointment with PCP within 7-14 days    Follow Up  Future Appointments   Date Time Provider Department Center   2024  2:00 PM [facility]TR[facility]   2024  2:30 PM Kimberly Richard DO [facility]   4/3/2024 11:20 AM Maddi Starks, APRN - CNP [facility]       Linda Johnston MA

## 2024-01-23 NOTE — GROUP NOTE
Community Meeting and Cognitive Skills Group Note        Date: January 23, 2024 Start Time: 1:30pm  End Time: 2:30pm      Number of Participants in Group & Unit Census:  6/13    Topic: Anger management    Goal of Group:Coping skills and recognition       Comments:     Patient did not participate in Community Meeting and Cognitive Skills group, despite staff encouragement and explanation of benefits.  Patient remain seclusive to self.  Q15 minute safety checks maintained for patient safety and will continue to encourage patient to attend unit programming.

## 2024-01-24 VITALS
DIASTOLIC BLOOD PRESSURE: 68 MMHG | HEIGHT: 64 IN | SYSTOLIC BLOOD PRESSURE: 97 MMHG | WEIGHT: 233 LBS | RESPIRATION RATE: 14 BRPM | BODY MASS INDEX: 39.78 KG/M2 | OXYGEN SATURATION: 100 % | TEMPERATURE: 96.8 F | HEART RATE: 70 BPM

## 2024-01-24 PROCEDURE — 6370000000 HC RX 637 (ALT 250 FOR IP): Performed by: NURSE PRACTITIONER

## 2024-01-24 PROCEDURE — 6370000000 HC RX 637 (ALT 250 FOR IP): Performed by: PSYCHIATRY & NEUROLOGY

## 2024-01-24 PROCEDURE — 6370000000 HC RX 637 (ALT 250 FOR IP): Performed by: INTERNAL MEDICINE

## 2024-01-24 PROCEDURE — 99238 HOSP IP/OBS DSCHRG MGMT 30/<: CPT | Performed by: PSYCHIATRY & NEUROLOGY

## 2024-01-24 RX ORDER — ASPIRIN 81 MG/1
81 TABLET ORAL DAILY
Qty: 30 TABLET | Refills: 3 | Status: SHIPPED | OUTPATIENT
Start: 2024-01-25

## 2024-01-24 RX ORDER — ALBUTEROL SULFATE 90 UG/1
2 AEROSOL, METERED RESPIRATORY (INHALATION) EVERY 6 HOURS PRN
Qty: 18 G | Refills: 3 | Status: SHIPPED | OUTPATIENT
Start: 2024-01-24

## 2024-01-24 RX ORDER — BUDESONIDE AND FORMOTEROL FUMARATE DIHYDRATE 160; 4.5 UG/1; UG/1
2 AEROSOL RESPIRATORY (INHALATION)
Qty: 10.2 G | Refills: 3 | Status: SHIPPED | OUTPATIENT
Start: 2024-01-24

## 2024-01-24 RX ORDER — SERTRALINE HYDROCHLORIDE 100 MG/1
200 TABLET, FILM COATED ORAL DAILY
Qty: 30 TABLET | Refills: 3 | Status: SHIPPED | OUTPATIENT
Start: 2024-01-25

## 2024-01-24 RX ORDER — ATORVASTATIN CALCIUM 40 MG/1
40 TABLET, FILM COATED ORAL NIGHTLY
Qty: 30 TABLET | Refills: 3 | Status: SHIPPED | OUTPATIENT
Start: 2024-01-24

## 2024-01-24 RX ORDER — TRAZODONE HYDROCHLORIDE 100 MG/1
100 TABLET ORAL NIGHTLY PRN
Qty: 30 TABLET | Refills: 0 | Status: SHIPPED | OUTPATIENT
Start: 2024-01-24

## 2024-01-24 RX ADMIN — HYDROCODONE BITARTRATE AND ACETAMINOPHEN 1 TABLET: 10; 325 TABLET ORAL at 15:46

## 2024-01-24 RX ADMIN — FERROUS SULFATE TAB 325 MG (65 MG ELEMENTAL FE) 325 MG: 325 (65 FE) TAB at 10:10

## 2024-01-24 RX ADMIN — SERTRALINE 200 MG: 100 TABLET, FILM COATED ORAL at 10:10

## 2024-01-24 RX ADMIN — HYDROCODONE BITARTRATE AND ACETAMINOPHEN 1 TABLET: 10; 325 TABLET ORAL at 11:28

## 2024-01-24 RX ADMIN — TIOTROPIUM BROMIDE INHALATION SPRAY 2 PUFF: 3.12 SPRAY, METERED RESPIRATORY (INHALATION) at 10:10

## 2024-01-24 RX ADMIN — HYDROXYZINE HYDROCHLORIDE 50 MG: 50 TABLET, FILM COATED ORAL at 05:48

## 2024-01-24 RX ADMIN — METOPROLOL TARTRATE 50 MG: 50 TABLET ORAL at 10:09

## 2024-01-24 RX ADMIN — FOLIC ACID 1 MG: 1 TABLET ORAL at 10:10

## 2024-01-24 RX ADMIN — IBUPROFEN 400 MG: 400 TABLET, FILM COATED ORAL at 10:11

## 2024-01-24 RX ADMIN — ASPIRIN 81 MG: 81 TABLET, COATED ORAL at 10:09

## 2024-01-24 RX ADMIN — BUDESONIDE AND FORMOTEROL FUMARATE DIHYDRATE 2 PUFF: 160; 4.5 AEROSOL RESPIRATORY (INHALATION) at 10:10

## 2024-01-24 RX ADMIN — PANTOPRAZOLE SODIUM 40 MG: 40 TABLET, DELAYED RELEASE ORAL at 10:10

## 2024-01-24 RX ADMIN — HYDROCODONE BITARTRATE AND ACETAMINOPHEN 1 TABLET: 10; 325 TABLET ORAL at 05:48

## 2024-01-24 ASSESSMENT — PAIN DESCRIPTION - DESCRIPTORS: DESCRIPTORS: DISCOMFORT

## 2024-01-24 ASSESSMENT — PAIN - FUNCTIONAL ASSESSMENT
PAIN_FUNCTIONAL_ASSESSMENT: 0-10
PAIN_FUNCTIONAL_ASSESSMENT: 0-10

## 2024-01-24 ASSESSMENT — PAIN DESCRIPTION - LOCATION
LOCATION: BACK

## 2024-01-24 ASSESSMENT — PAIN SCALES - GENERAL
PAINLEVEL_OUTOF10: 3
PAINLEVEL_OUTOF10: 7
PAINLEVEL_OUTOF10: 9
PAINLEVEL_OUTOF10: 0

## 2024-01-24 NOTE — GROUP NOTE
Group Therapy Note    Date: 1/24/2024    Group Start Time: 1045  Group End Time: 1130  Group Topic: Cognitive Skills    [facility] Nani Negro CTRS        Group Therapy Note    Attendees: 11/20       Patient's Goal:  pt will demonstrate improved coping skills and improved interpersonal relationships    Notes:  pt is pleasant and participates well     Status After Intervention:  Improved    Participation Level: Active Listener and Interactive    Participation Quality: Appropriate and Sharing      Speech:  normal      Thought Process/Content: Logical      Affective Functioning: Congruent      Mood: euthymic      Level of consciousness:  Alert      Response to Learning: Able to verbalize current knowledge/experience, Capable of insight, and Progressing to goal      Endings: None Reported    Modes of Intervention: Education, Support, and Activity      Discipline Responsible: Psychoeducational Specialist      Signature:  UMAIR PINEDO

## 2024-01-24 NOTE — TRANSITION OF CARE
Breathing aid     traZODone 100 MG tablet  Commonly known as: DESYREL  Take 1 tablet by mouth nightly as needed for Sleep  Notes to patient: Sleep aid            CHANGE how you take these medications      albuterol sulfate  (90 Base) MCG/ACT inhaler  Commonly known as: PROVENTIL;VENTOLIN;PROAIR  Inhale 2 puffs into the lungs every 6 hours as needed for Wheezing or Shortness of Breath  What changed:   See the new instructions.  Another medication with the same name was removed. Continue taking this medication, and follow the directions you see here.  Notes to patient: Breathing aid     rOPINIRole 2 MG tablet  Commonly known as: REQUIP  TAKE TWO TABLETS BY MOUTH EVERY NIGHT AT BEDTIME  What changed: See the new instructions.  Notes to patient: Restless legs     sertraline 100 MG tablet  Commonly known as: ZOLOFT  Take 2 tablets by mouth daily  Start taking on: January 25, 2024  What changed:   medication strength  how much to take  Notes to patient: depression            CONTINUE taking these medications      ferrous sulfate 325 (65 Fe) MG tablet  Commonly known as: IRON 325  Take 1 tablet by mouth daily (with breakfast)  Notes to patient: vitamin     folic acid 1 MG tablet  Commonly known as: FOLVITE  Take 1 tablet by mouth daily  Notes to patient: vitamin     HYDROcodone-acetaminophen  MG per tablet  Commonly known as: NORCO  Notes to patient: pain     metoprolol tartrate 50 MG tablet  Commonly known as: LOPRESSOR  50 mg Take 1 tablet by mouth daily.  Notes to patient: Control blood pressure     ondansetron 4 MG tablet  Commonly known as: ZOFRAN  Take 1 tablet by mouth every 8 hours as needed for Nausea or Vomiting  Notes to patient: vomiting     pantoprazole 40 MG tablet  Commonly known as: PROTONIX  TAKE ONE TABLET BY MOUTH DAILY  Notes to patient: Stomach health     senna 8.6 MG tablet  Commonly known as: SENOKOT  Take 1 tablet by mouth every other day  Notes to patient: Stool softerner

## 2024-01-24 NOTE — GROUP NOTE
Group Therapy Note    Date: 1/24/2024    Group Start Time: 1330  Group End Time: 1415  Group Topic psycheducation group     [facility]    Nani Cm CTRS        Group Therapy Note    Attendees: 9/18       Patient's Goal:  pt will demonstrate improved communication skills and improved interpersonal relationships    Notes:  pt is pleasant and participates well     Status After Intervention:  Improved    Participation Level: Active Listener and Interactive    Participation Quality: Appropriate and Sharing      Speech:  normal      Thought Process/Content: Logical      Affective Functioning: Congruent      Mood: euthymic      Level of consciousness:  Alert      Response to Learning: Able to verbalize current knowledge/experience, Capable of insight, and Progressing to goal      Endings: None Reported    Modes of Intervention: Education, Support, and Activity      Discipline Responsible: Psychoeducational Specialist      Signature:  UMAIR PINEDO

## 2024-01-24 NOTE — DISCHARGE INSTRUCTIONS
have a weakened immune system: You can end isolation at the end of Day 10 or later. Talk to your doctor to find out when it's safe to end isolation. You may need a viral test.  After you end isolation, if your symptoms come back or get worse: Restart your isolation at Day 0. Do this even if it happens after you took medicine for COVID.  Avoid travel and stay away from people at high risk for serious disease for at least 10 days.  Those who can't wear a mask because they are under 2 years old or have certain disabilities should isolate for at least 10 full days.  Check the CDC website at cdc.gov for the most current information on how to protect yourself.  How can you self-isolate when you have COVID-19?  If you have COVID-19, there are things you can do to help avoid spreading the virus to others.  Stay home, and avoid contact with other people.  Limit contact with people in your home. If possible, stay in a separate bedroom and use a separate bathroom.  Wear a high-quality mask when you are around other people.  Improve airflow. If you have to spend time indoors with others, open windows and doors. Or you can use a fan to blow air away from people and out a window.  Avoid contact with pets and other animals.  Cover your mouth and nose with a tissue when you cough or sneeze. Then throw it in the trash right away.  Wash your hands often, especially after you cough or sneeze. Use soap and water, and scrub for at least 20 seconds. If soap and water aren't available, use an alcohol-based hand .  Don't share personal household items. These include bedding, towels, cups and glasses, and eating utensils.  Wash laundry in the warmest water allowed for the fabric type, and dry it completely. It's okay to wash other people's laundry with yours.  Clean and disinfect your home. Use household  and disinfectant wipes or sprays.  Go to the CDC website at cdc.gov if you have questions.  When should you call for

## 2024-01-24 NOTE — PROGRESS NOTES
Behavioral Services  Medicare Certification Upon Admission    I certify that this patient's inpatient psychiatric hospital admission is medically necessary for:    [x] (1) Treatment which could reasonably be expected to improve this patient's condition,       [x] (2) Or for diagnostic study;     AND     [x](2) The inpatient psychiatric services are provided while the individual is under the care of a physician and are included in the individualized plan of care.    Estimated length of stay/service 2-9 days    Plan for post-hospital care -outpatient care    Electronically signed by RAUL MONROE MD on 1/19/2024 at 9:16 AM      
Behavioral [facility] [place]  Discharge Note    Pt discharged with followings belongings:   Dental Appliances: None  Vision - Corrective Lenses: None  Hearing Aid: None  Jewelry: None  Body Piercings Removed: N/A  Clothing: Footwear, Gloves, Jacket/Coat, Pajamas, Pants, Shirt, Socks, Sweater, Undergarments  Other Valuables: Other (Comment) (none)   Valuables sent home with pt or returned to patient. Patient educated on aftercare instructions: yes  Information faxed to [facility] by writer  at 5:17 PM .Patient verbalize understanding of AVS:  yes.    Status EXAM upon discharge:  Mental Status and Behavioral Exam  Normal: No  Level of Assistance: Independent/Self  Facial Expression: Brightened  Affect: Appropriate  Level of Consciousness: Alert  Frequency of Checks: 4 times per hour, close  Mood:Normal: No  Mood: Anxious  Motor Activity:Normal: Yes  Motor Activity: Decreased  Eye Contact: Fair  Observed Behavior: Cooperative  Sexual Misconduct History: Current - no  Preception: [place] to person, [place] to time, [place] to place  Attention:Normal: No  Attention: Distractible  Thought Processes: Circumstantial  Thought Content:Normal: No  Thought Content: Preoccupations  Depression Symptoms: No problems reported or observed.  Anxiety Symptoms: Generalized  Dilcia Symptoms: No problems reported or observed.  Hallucinations: None  Delusions: No  Memory:Normal: Yes  Memory: Poor recent, Poor remote  Insight and Judgment: No  Insight and Judgment: Poor judgment, Poor insight    Tobacco Screening:  Practical Counseling, on admission, tonya X, if applicable and completed (first 3 are required if patient doesn't refuse):            ( ) Recognizing danger situations (included triggers and roadblocks)                    ( ) Coping skills (new ways to manage stress,relaxation techniques, changing routine, distraction)                                                           ( ) Basic information about quitting (benefits of quitting, 
CLINICAL PHARMACY NOTE: MEDS TO BEDS    Total # of Prescriptions Filled: 7   The following medications were delivered to the patient:  Spiriva Respimat 2.5mcg  Symbicort 160/4.5mcg  Albuterol inhaler  Sertraline 100mg  Aspirin 81mg  Trazodone 100mg  Atorvastatin 40mg     Additional Documentation: delivered to [facility] 1/24/24/ 12:36pm by Madison borrego  
I independently saw and evaluated the patient.  I reviewed the  documentation of the NP.  Any additional comments or changes to the    documentation are stated below otherwise agree with assessment.      The patient is distressed because she is in pain.  She is also complaining of some sleep difficulties.  Zoloft increased to 150 mg daily  Medications Changed Today.  A discussion of risks, benefits, and alternatives was held with the patient and this provider with regards to medication changes.  After this discussion we mutually agreed to proceed with the medication changes.  PLAN  Medications as noted above  Attempt to develop insight  Expected Length of Stay is 3-5 days.   Psycho-education conducted.  Supportive Therapy conducted.  Follow-up daily while on inpatient unit    Electronically signed by ARUL MONROE MD on 1/20/24 at 1:40 PM EST    
MG tablet Take 1 tablet by mouth daily 12/11/23   Maddi Starks APRN - CNP   ferrous sulfate (IRON 325) 325 (65 Fe) MG tablet Take 1 tablet by mouth daily (with breakfast) 12/11/23   Maddi Starks APRN - CNP   pantoprazole (PROTONIX) 40 MG tablet TAKE ONE TABLET BY MOUTH DAILY 11/30/23   Maddi Starks APRN - CNP   lidocaine (LIDODERM) 5 % APPLY 1 PATCH TO AFFECTED AREA FOR 12 HOURS IN A 24 HOUR PERIOD 11/16/23   Maddi Starks APRN - CNP   ketorolac (TORADOL) 10 MG tablet Take 1 tablet by mouth every 8 hours as needed for Pain  Patient not taking: Reported on 1/18/2024 10/26/23   Marin Riggs MD   ondansetron (ZOFRAN) 4 MG tablet Take 1 tablet by mouth every 8 hours as needed for Nausea or Vomiting 10/2/23   Maddi Starks APRN - CNP   metoprolol tartrate (LOPRESSOR) 50 MG tablet 50 mg Take 1 tablet by mouth daily. 10/2/23   Maddi Starks APRN - CNP   pimecrolimus (ELIDEL) 1 % cream Apply topically 2 times daily for 7 days. 10/2/23   Maddi Starks APRN - CNP   sertraline (ZOLOFT) 50 MG tablet Take 1 tablet by mouth daily 9/14/23 1/18/24  Maddi Starks APRN - CNP   buPROPion (WELLBUTRIN XL) 300 MG extended release tablet Take 1 tablet by mouth every morning 9/14/23   Maddi Starks APRN - CNP   rOPINIRole (REQUIP) 2 MG tablet TAKE TWO TABLETS BY MOUTH EVERY NIGHT AT BEDTIME  Patient taking differently: Take 2 tablets by mouth nightly 7/6/23   Maddi Starks APRN - CNP   HYDROcodone-acetaminophen (NORCO)  MG per tablet Take 1 tablet by mouth every 4 hours as needed for Pain. 6/5/23   ProviderErick MD   VENTMARILYN  (90 Base) MCG/ACT inhaler inhale 2 puffs by mouth four times a day if needed for wheezing 7/8/19   ProviderErick MD   albuterol (PROVENTIL) (2.5 MG/3ML) 0.083% nebulizer solution Take 3 mLs by nebulization every 6 hours as needed 7/11/18   Provider, MD Erick        Allergies:     Amitriptyline, Iodides, Penicillins, Gabapentin, 
if you have any questions about this encounter. Thank you!    Electronically signed by Jordyn Trevino RPH on 1/19/2024 at 8:39 AM     
(ADVIL;MOTRIN) tablet 400 mg, 400 mg, Oral, Q6H PRN  polyethylene glycol (GLYCOLAX) packet 17 g, 17 g, Oral, Daily PRN  aluminum & magnesium hydroxide-simethicone (MAALOX) 200-200-20 MG/5ML suspension 30 mL, 30 mL, Oral, Q6H PRN  hydrOXYzine HCl (ATARAX) tablet 50 mg, 50 mg, Oral, TID PRN  nicotine polacrilex (COMMIT) lozenge 2 mg, 2 mg, Oral, Q1H PRN  ferrous sulfate (IRON 325) tablet 325 mg, 325 mg, Oral, Daily with breakfast  folic acid (FOLVITE) tablet 1 mg, 1 mg, Oral, Daily  HYDROcodone-acetaminophen (NORCO)  MG per tablet 1 tablet, 1 tablet, Oral, Q4H PRN  ondansetron (ZOFRAN) tablet 4 mg, 4 mg, Oral, Q8H PRN  pantoprazole (PROTONIX) tablet 40 mg, 40 mg, Oral, Daily  senna (SENOKOT) tablet 8.6 mg, 1 tablet, Oral, Every Other Day  metoprolol tartrate (LOPRESSOR) tablet 50 mg, 50 mg, Oral, Daily  rOPINIRole (REQUIP) tablet 4 mg, 4 mg, Oral, Nightly  aspirin EC tablet 81 mg, 81 mg, Oral, Daily  atorvastatin (LIPITOR) tablet 40 mg, 40 mg, Oral, Nightly  budesonide-formoterol (SYMBICORT) 160-4.5 MCG/ACT inhaler 2 puff, 2 puff, Inhalation, BID RT  tiotropium (SPIRIVA RESPIMAT) 2.5 MCG/ACT inhaler 2 puff, 2 puff, Inhalation, Daily RT  albuterol sulfate HFA (PROVENTIL;VENTOLIN;PROAIR) 108 (90 Base) MCG/ACT inhaler 2 puff, 2 puff, Inhalation, Q6H PRN    ASSESSMENT  Depression with suicidal ideation         HANDOFF  Patient symptoms: Showing some improvement  Consultation with attending physician for medication.  Encourage participation in groups and milieu.  Probable discharge is to be determined by MD.    Electronically signed by AIMEE Hidalgo CNP on 1/21/2024 at 3:55 PM    **This report has been created using voice recognition software. It may contain minor errors which are inherent in voice recognition technology.**    I independently saw and evaluated the patient.  I reviewed the  documentation above    .  Any additional comments or changes to the   documentation are stated below otherwise agree 
likely tomorrow.    Electronically signed by AIMEE Lees CNP on 1/23/2024 at 2:24 PM    **This report has been created using voice recognition software. It may contain minor errors which are inherent in voice recognition technology.**  I independently saw and evaluated the patient.  I reviewed the  documentation above    .  Any additional comments or changes to the   documentation are stated below otherwise agree with assessment.      The patient was seen face-to-face.  Her mood has been stable.  She continues to be constricted in affect and somewhat isolative.  I encouraged her to participate in groups.    No Medication Changes Today    PLAN  Medications as noted above  Attempt to develop insight  Psycho-education conducted.  Supportive Therapy conducted.  Follow-up daily while on inpatient unit    Electronically signed by RAUL MONROE MD on 1/23/24 at 8:44 PM EST      
mg, 40 mg, Oral, Nightly  budesonide-formoterol (SYMBICORT) 160-4.5 MCG/ACT inhaler 2 puff, 2 puff, Inhalation, BID RT  tiotropium (SPIRIVA RESPIMAT) 2.5 MCG/ACT inhaler 2 puff, 2 puff, Inhalation, Daily RT  albuterol sulfate HFA (PROVENTIL;VENTOLIN;PROAIR) 108 (90 Base) MCG/ACT inhaler 2 puff, 2 puff, Inhalation, Q6H PRN    ASSESSMENT  Depression with suicidal ideation         HANDOFF  Patient symptoms: Showing some improvement  Consultation with attending physician for medication.  Encourage participation in groups and milieu.  Probable discharge is to be determined by MD.    Electronically signed by AIMEE Lees CNP on 1/22/2024 at 1:28 PM    **This report has been created using voice recognition software. It may contain minor errors which are inherent in voice recognition technology.**  I independently saw and evaluated the patient.  I reviewed the  documentation above    .  Any additional comments or changes to the   documentation are stated below otherwise agree with assessment.      The patient is feeling somewhat better.  Her pain continues to be a problem and brings her mood down.  She denies suicidal thoughts.  Her expected length of stay is 2 days    No Medication Changes Today    PLAN  Medications as noted above  Attempt to develop insight  Psycho-education conducted.  Supportive Therapy conducted.  Follow-up daily while on inpatient unit    Electronically signed by RAUL MONROE MD on 1/22/24 at 7:49 PM EST      
puff, 2 puff, Inhalation, BID RT  tiotropium (SPIRIVA RESPIMAT) 2.5 MCG/ACT inhaler 2 puff, 2 puff, Inhalation, Daily RT  albuterol sulfate HFA (PROVENTIL;VENTOLIN;PROAIR) 108 (90 Base) MCG/ACT inhaler 2 puff, 2 puff, Inhalation, Q6H PRN    ASSESSMENT  Depression with suicidal ideation         HANDOFF  Patient symptoms: Showing some improvement  Consultation with attending physician for medication.    Encourage participation in groups and milieu.  Probable discharge is to be determined by MD.    Electronically signed by AIMEE Hidalgo CNP on 1/20/2024 at 5:37 PM    **This report has been created using voice recognition software. It may contain minor errors which are inherent in voice recognition technology.**  I independently saw and evaluated the patient.  I reviewed the  documentation above    .  Any additional comments or changes to the   documentation are stated below otherwise agree with assessment.      The patient has had difficulty sleeping because of pain.  Tizanidine 8 mg nightly has been added and her trazodone dose has been increased to 150 mg nightly    Medications Changed Today.  A discussion of risks, benefits, and alternatives was held with the patient and this provider with regards to medication changes.  After this discussion we mutually agreed to proceed with the medication changes.    PLAN  Medications as noted above  Attempt to develop insight  Psycho-education conducted.  Supportive Therapy conducted.  Follow-up daily while on inpatient unit    Electronically signed by RAUL MONROE MD on 1/20/24 at 7:17 PM EST
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
## 2024-01-24 NOTE — PLAN OF CARE
Problem: Anxiety  Goal: Will report anxiety at manageable levels  Description: INTERVENTIONS:  1. Administer medication as ordered  2. Teach and rehearse alternative coping skills  3. Provide emotional support with 1:1 interaction with staff  1/20/2024 2156 by Amaya Kevin LPN  Outcome: Progressing  Note: Patient states she had an ok day that it came in waves.    Patient is cooperative and friendly with staff.   Patient is educated if needing anything to seek help from staff.        
  Problem: Anxiety  Goal: Will report anxiety at manageable levels  Description: INTERVENTIONS:  1. Administer medication as ordered  2. Teach and rehearse alternative coping skills  3. Provide emotional support with 1:1 interaction with staff  1/21/2024 1144 by Nimco Ibrahim  Outcome: Progressing  Flowsheets (Taken 1/21/2024 1144)  Will report anxiety at manageable levels:   Provide emotional support with 1:1 interaction with staff   Teach and rehearse alternative coping skills  Note: Patient is accepting of 1:1 talk time with staff. Patient admits to depression and anxiety. Patient is eating and sleeping well. Patient denies suicidal and homicidal ideation and verbally agrees to approach staff if thoughts of self harm arises. Patient is medication compliant. Patient attends unit programming. Patient is out and social. Reassurance and support provided. Q15 minute checks maintained. Patient remains safe at this time.      
  Problem: Anxiety  Goal: Will report anxiety at manageable levels  Description: INTERVENTIONS:  1. Administer medication as ordered  2. Teach and rehearse alternative coping skills  3. Provide emotional support with 1:1 interaction with staff  1/21/2024 2306 by Ranjan Gallegos RN  Outcome: Progressing     Problem: Risk for Elopement  Goal: Patient will not exit the unit/facility without proper excort  1/21/2024 2306 by Ranjan Gallegos RN  Outcome: Progressing     Problem: Pain  Goal: Verbalizes/displays adequate comfort level or baseline comfort level  1/21/2024 2306 by Ranjan Gallegos RN  Outcome: Progressing     Problem: Safety - Adult  Goal: Free from fall injury  1/21/2024 2306 by Ranjan Gallegos RN  Outcome: Progressing     Problem: Self Harm/Suicidality  Goal: Will have no self-injury during hospital stay  Description: INTERVENTIONS:  1.  Ensure constant observer at bedside with Q15M safety checks  2.  Maintain a safe environment  3.  Secure patient belongings  4.  Ensure family/visitors adhere to safety recommendations  5.  Ensure safety tray has been added to patient's diet order  6.  Every shift and PRN: Re-assess suicidal risk via Frequent Screener    1/21/2024 2306 by Ranjan Gallegos RN  Outcome: Progressing  Note: Patient denies thoughts to harm self and others. Patient was cooperative with assessment, thought process is linear. Patient reports improvement in mood today. Patient spent most of the evening in the day area socializing with peers. Patient continues to express continuous back pain and pt finds Norco to be somewhat helpful. Safe environment maintained. Will continue to offer support.      
  Problem: Anxiety  Goal: Will report anxiety at manageable levels  Description: INTERVENTIONS:  1. Administer medication as ordered  2. Teach and rehearse alternative coping skills  3. Provide emotional support with 1:1 interaction with staff  1/23/2024 0839 by Nimco Ibrahim  Outcome: Progressing  Flowsheets (Taken 1/23/2024 0839)  Will report anxiety at manageable levels:   Provide emotional support with 1:1 interaction with staff   Teach and rehearse alternative coping skills  Note: Patient is accepting of 1:1 talk time with staff. Patient denies suicidal and homicidal ideation and auditory and visual hallucinations and verbally agrees to approach staff if thoughts of self harm arises. Patient admits to depression and anxiety. Patient is eating and sleeping well. Patient is medication compliant. Reassurance and support provided. Q15 minute checks maintained. Patient remains safe at this time.      
  Problem: Chronic Conditions and Co-morbidities  Goal: Patient's chronic conditions and co-morbidity symptoms are monitored and maintained or improved  Outcome: Progressing     Problem: Self Harm/Suicidality  Goal: Will have no self-injury during hospital stay  Description: INTERVENTIONS:  1.  Ensure constant observer at bedside with Q15M safety checks  2.  Maintain a safe environment  3.  Secure patient belongings  4.  Ensure family/visitors adhere to safety recommendations  5.  Ensure safety tray has been added to patient's diet order  6.  Every shift and PRN: Re-assess suicidal risk via Frequent Screener    Outcome: Progressing     Problem: Anxiety  Goal: Will report anxiety at manageable levels  Description: INTERVENTIONS:  1. Administer medication as ordered  2. Teach and rehearse alternative coping skills  3. Provide emotional support with 1:1 interaction with staff  Outcome: Progressing     Problem: Risk for Elopement  Goal: Patient will not exit the unit/facility without proper excort  Outcome: Progressing     Problem: Pain  Goal: Verbalizes/displays adequate comfort level or baseline comfort level  Outcome: Progressing     Problem: Safety - Adult  Goal: Free from fall injury  Outcome: Progressing       Patient denies thoughts of self harm during this shift. Patient is mostly isolative to room during this shift and reports this is due to increased pain today. Patient is taking PRN medications for pain and states the pain is improving, but still above her pain goal. Patient is more brightened today and states her mood is improving. Patient is cooperative with shift assessments and compliant with scheduled medications. Q15 minute and random safety checks maintained.  
  Problem: Chronic Conditions and Co-morbidities  Goal: Patient's chronic conditions and co-morbidity symptoms are monitored and maintained or improved  Outcome: Progressing     Problem: Self Harm/Suicidality  Goal: Will have no self-injury during hospital stay  Description: INTERVENTIONS:  1.  Ensure constant observer at bedside with Q15M safety checks  2.  Maintain a safe environment  3.  Secure patient belongings  4.  Ensure family/visitors adhere to safety recommendations  5.  Ensure safety tray has been added to patient's diet order  6.  Every shift and PRN: Re-assess suicidal risk via Frequent Screener    Outcome: Progressing     Problem: Anxiety  Goal: Will report anxiety at manageable levels  Description: INTERVENTIONS:  1. Administer medication as ordered  2. Teach and rehearse alternative coping skills  3. Provide emotional support with 1:1 interaction with staff  Outcome: Progressing     Problem: Risk for Elopement  Goal: Patient will not exit the unit/facility without proper excort  Outcome: Progressing     Problem: Pain  Goal: Verbalizes/displays adequate comfort level or baseline comfort level  Outcome: Progressing     Problem: Safety - Adult  Goal: Free from fall injury  Outcome: Progressing       Patient denies thoughts of self harm during this shift. Patient is out and social with select peers and attends groups. Patient is irritable at times and reports it is related to chronic pain in her back. Patient is cooperative with shift assessment and compliant with scheduled medications. Q15 minute and random safety checks maintained.  
  Problem: Chronic Conditions and Co-morbidities  Goal: Patient's chronic conditions and co-morbidity symptoms are monitored and maintained or improved  Outcome: Progressing     Problem: Self Harm/Suicidality  Goal: Will have no self-injury during hospital stay  Description: INTERVENTIONS:  1.  Ensure constant observer at bedside with Q15M safety checks  2.  Maintain a safe environment  3.  Secure patient belongings  4.  Ensure family/visitors adhere to safety recommendations  5.  Ensure safety tray has been added to patient's diet order  6.  Every shift and PRN: Re-assess suicidal risk via Frequent Screener    Outcome: Progressing  Note: Patient denied thoughts of hurting herself or others     Problem: Anxiety  Goal: Will report anxiety at manageable levels  Description: INTERVENTIONS:  1. Administer medication as ordered  2. Teach and rehearse alternative coping skills  3. Provide emotional support with 1:1 interaction with staff  1/23/2024 2221 by Elmer Varma RN  Outcome: Progressing  Note: Patient rated her anxiety 5/10. She denied having any depression. She was brightened during conversation. She ate snack and was social with select peers in the dayroom.      Problem: Risk for Elopement  Goal: Patient will not exit the unit/facility without proper excort  Outcome: Progressing     Problem: Safety - Adult  Goal: Free from fall injury  Outcome: Progressing     Problem: Pain  Goal: Verbalizes/displays adequate comfort level or baseline comfort level  Outcome: Not Progressing  Note: Patient states her pain is constant, norco was given to her to help with the lower back pain.      
  Problem: Chronic Conditions and Co-morbidities  Goal: Patient's chronic conditions and co-morbidity symptoms are monitored and maintained or improved  Outcome: Progressing   Chronic conditions are monitored and maintained  Problem: Self Harm/Suicidality  Goal: Will have no self-injury during hospital stay  Description: INTERVENTIONS:  1.  Ensure constant observer at bedside with Q15M safety checks  2.  Maintain a safe environment  3.  Secure patient belongings  4.  Ensure family/visitors adhere to safety recommendations  5.  Ensure safety tray has been added to patient's diet order  6.  Every shift and PRN: Re-assess suicidal risk via Frequent Screener    Outcome: Progressing   Denies any thoughts to harm self  Problem: Anxiety  Goal: Will report anxiety at manageable levels  Description: INTERVENTIONS:  1. Administer medication as ordered  2. Teach and rehearse alternative coping skills  3. Provide emotional support with 1:1 interaction with staff  Outcome: Progressing   Miss Coleman is seen in the dayroom, affect is flat, she reports depression and anxiety over chronic back issues and home life problems, She reports these cause her to have suicidal thoughts. Feels safe here. Fair sleep, good appetite, Taking medications .  Problem: Risk for Elopement  Goal: Patient will not exit the unit/facility without proper excort  Outcome: Progressing   No exit seeking behavior  Problemn: Pain  Goal: Verbalizes/displays adequate comfort level or baseline comfort level  Outcome: Progressing   Patient hs chronic back pain and has medication ordered every 4 hrs PRN  
  Problem: Self Harm/Suicidality  Goal: Will have no self-injury during hospital stay  Description: INTERVENTIONS:  1.  Ensure constant observer at bedside with Q15M safety checks  2.  Maintain a safe environment  3.  Secure patient belongings  4.  Ensure family/visitors adhere to safety recommendations  5.  Ensure safety tray has been added to patient's diet order  6.  Every shift and PRN: Re-assess suicidal risk via Frequent Screener    1/19/2024 2231 by Alesha Scott LPN  Note: Patient denies all self harm or suicidal ideation. Patient cooperative and appropriate. Q 15 min safety checks continued, care ongoing.      Problem: Pain  Goal: Verbalizes/displays adequate comfort level or baseline comfort level  1/19/2024 2232 by Alesha Scott LPN  Note: Patient has chronic pain and taking Norco every 4 hours as needed. Q 15 min safety checks continued, care ongoing.     
  Problem: Self Harm/Suicidality  Goal: Will have no self-injury during hospital stay  Description: INTERVENTIONS:  1.  Ensure constant observer at bedside with Q15M safety checks  2.  Maintain a safe environment  3.  Secure patient belongings  4.  Ensure family/visitors adhere to safety recommendations  5.  Ensure safety tray has been added to patient's diet order  6.  Every shift and PRN: Re-assess suicidal risk via Frequent Screener    1/22/2024 2131 by Consuelo Connolly  Outcome: Progressing     Problem: Anxiety  Goal: Will report anxiety at manageable levels  Description: INTERVENTIONS:  1. Administer medication as ordered  2. Teach and rehearse alternative coping skills  3. Provide emotional support with 1:1 interaction with staff  1/22/2024 2131 by Consuelo Connolly  Outcome: Progressing     Problem: Pain  Goal: Verbalizes/displays adequate comfort level or baseline comfort level  1/22/2024 2131 by Consuelo Connolly  Outcome: Progressing     Problem: Safety - Adult  Goal: Free from fall injury  1/22/2024 2131 by Consuelo Connolly  Outcome: Progressing    Patient is mostly isolative to room this evening, due to increased pain in lower back. Patient stated a hot shower helped with back pain and was seen watching tv later in the evening. Patient reports some anxiety due to home issues with her son and his pregnant girlfriend. Patient also reports some depression at this time. Patient stated that she has improved since her admission and will use the things she has learned once discharged. Patient wants to be more social and make friends her own age. Patient denies any thoughts of self harm and is free of self harm this shift. Patient is accepting of talk time with staff. Patient is free from falls.      
  Problem: Self Harm/Suicidality  Goal: Will have no self-injury during hospital stay  Description: INTERVENTIONS:  1.  Ensure constant observer at bedside with Q15M safety checks  2.  Maintain a safe environment  3.  Secure patient belongings  4.  Ensure family/visitors adhere to safety recommendations  5.  Ensure safety tray has been added to patient's diet order  6.  Every shift and PRN: Re-assess suicidal risk via Frequent Screener    1/24/2024 0811 by Crystal Escobar LPN  Outcome: Progressing  Flowsheets (Taken 1/24/2024 0811)  Will have no self-injury during hospital stay: Maintain a safe environment  Note: No violent or negative behaviors noted at this time.  Will cont to provide safe, calm, environment and use verbal de-escalation techniques when needed.  Support and reassurance given as needed.      
[facility]  Day 3 Interdisciplinary Treatment Plan NOTE    Review Date & Time: 1/21/2024   1245    Admission Type:   Admission Type: Involuntary    Reason for admission:  Reason for Admission: Involuntary patient from [facility] reporting suicidal ideation to overdose on medications of get hit by a train related to an increase of depression. Patient states death of several family members of the last year as the trigger for her depression. She has a history of previous inpatient treatment and reports medication-compliance.  Estimated Length of Stay: 5-7 days  Estimated Discharge Date Update: to be determined by physician    PATIENT STRENGTHS:  Patient Strengths    Patient Strengths and Limitations:Limitations: Difficult relationships / poor social skills, Difficulty problem solving/relies on others to help solve problems  Addictive Behavior:Addictive Behavior  In the Past 3 Months, Have You Felt or Has Someone Told You That You Have a Problem With  : None  Medical Problems:  Past Medical History:   Diagnosis Date    Arthritis     Asthma     COPD (chronic obstructive pulmonary disease) ([facility])     Eczema of face     Genital warts     Hypertension     Psychiatric problem     Restless legs     Sleep apnea     Stroke ([facility])        Risk:  Fall Risk   Irwin Scale Irwin Scale Score: 22  BVC    Change in scores no Changes to plan of Care no    Status EXAM:   Mental Status and Behavioral Exam  Normal: No  Level of Assistance: Independent/Self  Facial Expression: Flat  Affect: Blunt  Level of Consciousness: Alert  Frequency of Checks: 4 times per hour, close  Mood:Normal: No  Mood: Depressed, Anxious  Motor Activity:Normal: Yes  Motor Activity: Decreased  Eye Contact: Fair  Observed Behavior: Friendly, Cooperative  Sexual Misconduct History: Current - no  Preception: Fackler to person, Fackler to time, Fackler to place, Fackler to situation  Attention:Normal: No  Attention: Distractible  Thought Processes: 
Preoccupations  Depression Symptoms: Impaired concentration, Loss of interest, Change in energy level, Feelings of helplessness, Feelings of hopelessess  Anxiety Symptoms: Generalized  Dilcia Symptoms: No problems reported or observed.  Hallucinations: None  Delusions: No  Memory:Normal: Yes  Insight and Judgment: No  Insight and Judgment: Poor judgment, Poor insight    EDUCATION:   Learner Progress Toward Treatment Goals: Will review group plans and strategies for care.    Method: Group therapy, Medication compliance, Individualized assessments and Care planning.    Outcome: Needs Reinforcement    PATIENT GOALS: To be discussed with patient within 72 hours    PLAN/TREATMENT RECOMMENDATIONS:     Continue group therapy , Medications compliance, Goal setting, Individualized assessments and Care planning, continue to monitor patient on unit.      SHORT-TERM GOALS:   Time frame for Short-Term Goals: 5-7 days  Medication compliance    LONG-TERM GOALS:  Time frame for Long-Term Goals: 6 months  Follow up term treatment   Members Present in Team Meeting: See Signature Sheet    Gisela Curtis RN

## 2024-01-24 NOTE — GROUP NOTE
Group Therapy Note    Date: 1/24/2024    Group Start Time: 0915  Group End Time: 0945  Group Topic: Community Meeting    Savanna Holley        Group Therapy Note    Attendees: 7/20     Community Meeting Group Note        Date: January 24, 2024 Start Time:  0915   End Time:  0945      Number of Participants in Group & Unit Census:  7/20    Topic: goal setting    Goal of Group: Set short term goal for the day      Comments:     Patient did not participate in Community Meeting group, despite staff encouragement and explanation of benefits.  Patient remain seclusive to self.  Q15 minute safety checks maintained for patient safety and will continue to encourage patient to attend unit programming.

## 2024-01-25 NOTE — CARE COORDINATION
[facility] Biopsychosocial Assessment    Current Level of Psychosocial Functioning     Independent   Dependent  X  Minimal Assist     Psychosocial High Risk Factors (check all that apply)    Unable to obtain meds   Chronic illness/pain  X  Substance abuse X  Lack of Family Support X  Financial stress   Isolation X  Inadequate Community Resources X  Suicide attempt(s)  Not taking medications   Victim of crime   Developmental Delay  Unable to manage personal needs  X  Age 65 or older   Homeless  No transportation   Readmission within 30 days  Unemployment  Traumatic Event    Psychiatric Advanced Directives: none reported     Family to Involve in Treatment: lack of family support     Sexual Orientation:  TITA     Patient Strengths: adequate housing SSI income, insurance, follows up with PCP for medications     Patient Barriers: increase in Depression, presenting on admission with suicidal ideation, isolated      Opiate Education Provided: Patient denies Illicit drug use, but her drug screen was positive for Opiates upon admission.     CMHC/mental health history: Not linked with [facility], was seeing PCP for meds, is requesting to be linked with [facility] at discharge, stable housing in [place], Increase in Depression and SI     Plan of Care   medication management, group/individual therapies, family meetings, psycho -education, treatment team meetings to assist with stabilization    Initial Discharge Plan:  Patient reports a plan to return to her home in [place] at discharge and reports a plan to follow up with outpatient Treatment at [facility].       Clinical Summary:  Tanisha Coleman is a 58 y.o. female who presents on admission with an increase in Depression and suicidal ideation. Patient was transferred from [facility] reporting a plan to to overdose on medications or to get hit by a train.     Patient states she has been severely depressed lately and states main stressor is her son and her son's 
mouth daily  Notes to patient: vitamin     HYDROcodone-acetaminophen  MG per tablet  Commonly known as: NORCO  Notes to patient: pain     metoprolol tartrate 50 MG tablet  Commonly known as: LOPRESSOR  50 mg Take 1 tablet by mouth daily.  Notes to patient: Control blood pressure     ondansetron 4 MG tablet  Commonly known as: ZOFRAN  Take 1 tablet by mouth every 8 hours as needed for Nausea or Vomiting  Notes to patient: vomiting     pantoprazole 40 MG tablet  Commonly known as: PROTONIX  TAKE ONE TABLET BY MOUTH DAILY  Notes to patient: Stomach health     senna 8.6 MG tablet  Commonly known as: SENOKOT  Take 1 tablet by mouth every other day  Notes to patient: Stool softerner            STOP taking these medications      buPROPion 300 MG extended release tablet  Commonly known as: WELLBUTRIN XL     ketorolac 10 MG tablet  Commonly known as: TORADOL     lidocaine 5 %  Commonly known as: LIDODERM     LORazepam 1 MG tablet  Commonly known as: ATIVAN     pimecrolimus 1 % cream  Commonly known as: Elidel               Where to Get Your Medications        These medications were sent to [id number] - [address] -  [phone] - F [phone]  [address] [place] [id number]      Phone: [phone]   albuterol sulfate  (90 Base) MCG/ACT inhaler  aspirin 81 MG EC tablet  atorvastatin 40 MG tablet  budesonide-formoterol 160-4.5 MCG/ACT Aero  sertraline 100 MG tablet  tiotropium 2.5 MCG/ACT Aers inhaler  traZODone 100 MG tablet         Follow Up Appointment: [place] [address] [address] [place] [id number] [phone]  fax [phone]  Follow up on 1/25/2024  You have a scheduled appointment on Thursday January 25th at 9:30 am at the [place] office for financial intake, please bring your photo ID and insurance card with you to this appt, Following this appt you will see the therapist Nick at 10:00am     Patient transportation through [facility]

## 2024-01-29 ENCOUNTER — OFFICE VISIT (OUTPATIENT)
Dept: PRIMARY CARE CLINIC | Age: 59
End: 2024-01-29
Payer: COMMERCIAL

## 2024-01-29 VITALS
WEIGHT: 230.6 LBS | OXYGEN SATURATION: 98 % | HEART RATE: 55 BPM | BODY MASS INDEX: 39.58 KG/M2 | TEMPERATURE: 99.1 F | SYSTOLIC BLOOD PRESSURE: 114 MMHG | DIASTOLIC BLOOD PRESSURE: 84 MMHG | RESPIRATION RATE: 16 BRPM

## 2024-01-29 DIAGNOSIS — Z09 HOSPITAL DISCHARGE FOLLOW-UP: Primary | ICD-10-CM

## 2024-01-29 DIAGNOSIS — F41.9 ANXIETY: ICD-10-CM

## 2024-01-29 DIAGNOSIS — F31.9 BIPOLAR 1 DISORDER (HCC): ICD-10-CM

## 2024-01-29 PROCEDURE — 1111F DSCHRG MED/CURRENT MED MERGE: CPT | Performed by: NURSE PRACTITIONER

## 2024-01-29 PROCEDURE — 99214 OFFICE O/P EST MOD 30 MIN: CPT | Performed by: NURSE PRACTITIONER

## 2024-01-29 RX ORDER — LORAZEPAM 0.5 MG/1
0.5 TABLET ORAL 2 TIMES DAILY PRN
Qty: 60 TABLET | Refills: 0 | Status: SHIPPED | OUTPATIENT
Start: 2024-01-29 | End: 2024-02-28

## 2024-01-29 ASSESSMENT — PATIENT HEALTH QUESTIONNAIRE - PHQ9
SUM OF ALL RESPONSES TO PHQ9 QUESTIONS 1 & 2: 3
8. MOVING OR SPEAKING SO SLOWLY THAT OTHER PEOPLE COULD HAVE NOTICED. OR THE OPPOSITE, BEING SO FIGETY OR RESTLESS THAT YOU HAVE BEEN MOVING AROUND A LOT MORE THAN USUAL: 1
9. THOUGHTS THAT YOU WOULD BE BETTER OFF DEAD, OR OF HURTING YOURSELF: 0
6. FEELING BAD ABOUT YOURSELF - OR THAT YOU ARE A FAILURE OR HAVE LET YOURSELF OR YOUR FAMILY DOWN: 1
2. FEELING DOWN, DEPRESSED OR HOPELESS: 1
7. TROUBLE CONCENTRATING ON THINGS, SUCH AS READING THE NEWSPAPER OR WATCHING TELEVISION: 0
4. FEELING TIRED OR HAVING LITTLE ENERGY: 1
SUM OF ALL RESPONSES TO PHQ QUESTIONS 1-9: 10
SUM OF ALL RESPONSES TO PHQ QUESTIONS 1-9: 10
3. TROUBLE FALLING OR STAYING ASLEEP: 3
1. LITTLE INTEREST OR PLEASURE IN DOING THINGS: 2
SUM OF ALL RESPONSES TO PHQ QUESTIONS 1-9: 10
10. IF YOU CHECKED OFF ANY PROBLEMS, HOW DIFFICULT HAVE THESE PROBLEMS MADE IT FOR YOU TO DO YOUR WORK, TAKE CARE OF THINGS AT HOME, OR GET ALONG WITH OTHER PEOPLE: 1
SUM OF ALL RESPONSES TO PHQ QUESTIONS 1-9: 10
5. POOR APPETITE OR OVEREATING: 1

## 2024-01-29 NOTE — PATIENT INSTRUCTIONS
SURVEY:     You may be receiving a survey from [facility] regarding your visit today.     Please complete the survey to enable us to provide the highest quality of care to you and your family.     If you cannot score us a very good on any question, please call the office to discuss how we could have made your experience a very good one.     Thank you,    Melchor Schneider, APRN-CNP  Maddi Starks, APRN-CNP  Hermelinda, LAURA Curtis, MELVA Schaefer, CMA  Linda, CMA  Leslie, PCA  Sonia, PM

## 2024-01-29 NOTE — PROGRESS NOTES
Post-Discharge Transitional Care  Follow Up      Tanisha Coleman   YOB: 1965    Date of Office Visit:  1/29/2024  Date of Hospital Admission: 1/19/24  Date of Hospital Discharge: 1/24/24  Risk of hospital readmission (high >=14%. Medium >=10%) :Readmission Risk Score: 8.6      Care management risk score Rising risk (score 2-5) and Complex Care (Scores >=6): No Risk Score On File     Non face to face  following discharge, date last encounter closed (first attempt may have been earlier): 01/23/2024    Call initiated 2 business days of discharge: Yes    ASSESSMENT/PLAN:   Hospital discharge follow-up  -     DE DISCHARGE MEDS RECONCILED W/ CURRENT OUTPATIENT MED LIST  Anxiety  -     LORazepam (ATIVAN) 0.5 MG tablet; Take 1 tablet by mouth 2 times daily as needed for Anxiety for up to 30 days. Max Daily Amount: 1 mg, Disp-60 tablet, R-0Normal  Bipolar 1 disorder (HCC)      Medical Decision Making: moderate complexity  Return if symptoms worsen or fail to improve.    On this date 1/29/2024 I have spent 30 minutes reviewing previous notes, test results and face to face with the patient discussing the diagnosis and importance of compliance with the treatment plan as well as documenting on the day of the visit.       Subjective:   HPI:  Follow up of Hospital problems/diagnosis(es): Tanisha states she is feeling better mentally but not feeling better physically.  She states she is confused on the treatment she received at the hospital.  She states that physician took her off of her Ativan and gave her valium instead while there but did not discharge her with anything for increased anxiety.  She states she continues to have increased anxiety.  She doesn't feel like the Valium helped.  They discontinued her Wellbutrin and increased her Zoloft while in hospital.  She was given Trazodone and states she is not going to take that again because it gave her vivid nightmares.  She denies any thoughts of suicide or

## 2024-02-11 DIAGNOSIS — D52.9 ANEMIA DUE TO FOLIC ACID DEFICIENCY, UNSPECIFIED DEFICIENCY TYPE: ICD-10-CM

## 2024-02-11 DIAGNOSIS — G25.81 RLS (RESTLESS LEGS SYNDROME): ICD-10-CM

## 2024-02-11 DIAGNOSIS — D50.9 IRON DEFICIENCY ANEMIA, UNSPECIFIED IRON DEFICIENCY ANEMIA TYPE: ICD-10-CM

## 2024-02-12 RX ORDER — SENNOSIDES A AND B 8.6 MG/1
1 TABLET, FILM COATED ORAL EVERY OTHER DAY
Qty: 60 TABLET | Refills: 0 | Status: SHIPPED | OUTPATIENT
Start: 2024-02-12

## 2024-02-12 RX ORDER — FERROUS SULFATE 325(65) MG
325 TABLET ORAL
Qty: 30 TABLET | Refills: 5 | Status: SHIPPED | OUTPATIENT
Start: 2024-02-12

## 2024-02-12 RX ORDER — ROPINIROLE 2 MG/1
TABLET, FILM COATED ORAL
Qty: 180 TABLET | Refills: 0 | Status: SHIPPED | OUTPATIENT
Start: 2024-02-12

## 2024-02-12 RX ORDER — FOLIC ACID 1 MG/1
1 TABLET ORAL DAILY
Qty: 90 TABLET | Refills: 1 | Status: SHIPPED | OUTPATIENT
Start: 2024-02-12

## 2024-02-12 NOTE — TELEPHONE ENCOUNTER
Health Maintenance   Topic Date Due    Colorectal Cancer Screen  Never done    COVID-19 Vaccine (3 - 2023-24 season) 09/01/2023    DTaP/Tdap/Td vaccine (1 - Tdap) 03/23/2024 (Originally 7/10/2019)    Shingles vaccine (2 of 2) 03/23/2024 (Originally 12/29/2020)    Hepatitis C screen  03/23/2024 (Originally 9/7/1984)    HIV screen  03/23/2024 (Originally 9/7/1981)    Pneumococcal 0-64 years Vaccine (2 - PPSV23 or PCV20) 04/27/2024 (Originally 12/29/2020)    Hepatitis B vaccine (1 of 3 - 3-dose series) 10/02/2024 (Originally 1965)    Lipids  01/21/2025    Depression Monitoring  01/29/2025    Breast cancer screen  05/10/2025    Cervical cancer screen  02/24/2027    Flu vaccine  Completed    Hepatitis A vaccine  Aged Out    Hib vaccine  Aged Out    Polio vaccine  Aged Out    Meningococcal (ACWY) vaccine  Aged Out    Diabetes screen  Discontinued             (applicable per patient's age: Cancer Screenings, Depression Screening, Fall Risk Screening, Immunizations)    Hemoglobin A1C (%)   Date Value   10/10/2023 5.1     LDL Cholesterol (mg/dL)   Date Value   01/21/2024 53     AST (U/L)   Date Value   01/18/2024 17     ALT (U/L)   Date Value   01/18/2024 9     BUN (mg/dL)   Date Value   01/18/2024 12      (goal A1C is < 7)   (goal LDL is <100) need 30-50% reduction from baseline     BP Readings from Last 3 Encounters:   01/29/24 114/84   01/18/24 97/67   01/03/24 102/76    (goal /80)      All Future Testing planned in [facility]:  Lab Frequency Next Occurrence   MRI CERVICAL SPINE WO CONTRAST Once 06/26/2023   CT CHEST ABDOMEN PELVIS WO CONTRAST Additional Contrast? Radiologist Recommendation Once 10/10/2023   Iron and TIBC Once 06/11/2024   Vitamin B12 & Folate Once 06/11/2024   Ferritin Once 06/11/2024       Next Visit Date:  Future Appointments   Date Time Provider Department Center   4/3/2024 11:20 AM Maddi Starks, APRN - CNP [facility]            Patient Active Problem List:     Bipolar 1 disorder

## 2024-03-12 ENCOUNTER — OFFICE VISIT (OUTPATIENT)
Dept: PRIMARY CARE CLINIC | Age: 59
End: 2024-03-12
Payer: COMMERCIAL

## 2024-03-12 VITALS
HEART RATE: 71 BPM | DIASTOLIC BLOOD PRESSURE: 86 MMHG | OXYGEN SATURATION: 98 % | TEMPERATURE: 98.7 F | SYSTOLIC BLOOD PRESSURE: 120 MMHG | RESPIRATION RATE: 16 BRPM | WEIGHT: 229.4 LBS | BODY MASS INDEX: 39.38 KG/M2

## 2024-03-12 DIAGNOSIS — M79.605 BILATERAL LEG PAIN: Primary | ICD-10-CM

## 2024-03-12 DIAGNOSIS — M79.604 BILATERAL LEG PAIN: Primary | ICD-10-CM

## 2024-03-12 DIAGNOSIS — M25.552 BILATERAL HIP PAIN: ICD-10-CM

## 2024-03-12 DIAGNOSIS — M25.551 BILATERAL HIP PAIN: ICD-10-CM

## 2024-03-12 PROCEDURE — 3017F COLORECTAL CA SCREEN DOC REV: CPT | Performed by: NURSE PRACTITIONER

## 2024-03-12 PROCEDURE — 3079F DIAST BP 80-89 MM HG: CPT | Performed by: NURSE PRACTITIONER

## 2024-03-12 PROCEDURE — 99214 OFFICE O/P EST MOD 30 MIN: CPT | Performed by: NURSE PRACTITIONER

## 2024-03-12 PROCEDURE — G8482 FLU IMMUNIZE ORDER/ADMIN: HCPCS | Performed by: NURSE PRACTITIONER

## 2024-03-12 PROCEDURE — G8417 CALC BMI ABV UP PARAM F/U: HCPCS | Performed by: NURSE PRACTITIONER

## 2024-03-12 PROCEDURE — 1036F TOBACCO NON-USER: CPT | Performed by: NURSE PRACTITIONER

## 2024-03-12 PROCEDURE — G8427 DOCREV CUR MEDS BY ELIG CLIN: HCPCS | Performed by: NURSE PRACTITIONER

## 2024-03-12 PROCEDURE — 3074F SYST BP LT 130 MM HG: CPT | Performed by: NURSE PRACTITIONER

## 2024-03-12 RX ORDER — SERTRALINE HYDROCHLORIDE 100 MG/1
200 TABLET, FILM COATED ORAL DAILY
Qty: 180 TABLET | Refills: 0 | Status: SHIPPED | OUTPATIENT
Start: 2024-03-12

## 2024-03-12 RX ORDER — OXYCODONE AND ACETAMINOPHEN 7.5; 325 MG/1; MG/1
TABLET ORAL
COMMUNITY
Start: 2024-03-01

## 2024-03-12 ASSESSMENT — PATIENT HEALTH QUESTIONNAIRE - PHQ9
SUM OF ALL RESPONSES TO PHQ QUESTIONS 1-9: 0
2. FEELING DOWN, DEPRESSED OR HOPELESS: 0
4. FEELING TIRED OR HAVING LITTLE ENERGY: 0
SUM OF ALL RESPONSES TO PHQ QUESTIONS 1-9: 0
10. IF YOU CHECKED OFF ANY PROBLEMS, HOW DIFFICULT HAVE THESE PROBLEMS MADE IT FOR YOU TO DO YOUR WORK, TAKE CARE OF THINGS AT HOME, OR GET ALONG WITH OTHER PEOPLE: 0
5. POOR APPETITE OR OVEREATING: 0
6. FEELING BAD ABOUT YOURSELF - OR THAT YOU ARE A FAILURE OR HAVE LET YOURSELF OR YOUR FAMILY DOWN: 0
SUM OF ALL RESPONSES TO PHQ QUESTIONS 1-9: 0
1. LITTLE INTEREST OR PLEASURE IN DOING THINGS: 0
3. TROUBLE FALLING OR STAYING ASLEEP: 0
SUM OF ALL RESPONSES TO PHQ9 QUESTIONS 1 & 2: 0
7. TROUBLE CONCENTRATING ON THINGS, SUCH AS READING THE NEWSPAPER OR WATCHING TELEVISION: 0
9. THOUGHTS THAT YOU WOULD BE BETTER OFF DEAD, OR OF HURTING YOURSELF: 0
SUM OF ALL RESPONSES TO PHQ QUESTIONS 1-9: 0
8. MOVING OR SPEAKING SO SLOWLY THAT OTHER PEOPLE COULD HAVE NOTICED. OR THE OPPOSITE, BEING SO FIGETY OR RESTLESS THAT YOU HAVE BEEN MOVING AROUND A LOT MORE THAN USUAL: 0

## 2024-03-12 NOTE — PATIENT INSTRUCTIONS
SURVEY:     You may be receiving a survey from [facility] regarding your visit today.     Please complete the survey to enable us to provide the highest quality of care to you and your family.     If you cannot score us a very good on any question, please call the office to discuss how we could have made your experience a very good one.     Thank you,    Melchor Schneider, APRN-CNP  Maddi Starks, APRN-CNP  Hermelinda, LPN  Kirsten, CMA  Silvano, CMA  Linda, CMA  Leslie, PCA  Consuelo, CMA  Sonia, PM

## 2024-03-12 NOTE — PROGRESS NOTES
[facility]  CARMEN WALL, CINDY  [facility]  [address] [place] [id number]  Dept: [phone]  Dept Fax: [phone]      Name: Tanisha Coleman  : 1965         Chief Complaint:     Chief Complaint   Patient presents with    Leg Pain     Patient c/o bilateral leg pain. Patient still having right side hip pain that she thinks is causing both legs. X 3 weeks it is worsening.        History of Present Illness:      Tanisha Coleman is a 58 y.o.  female who presents with Leg Pain (Patient c/o bilateral leg pain. Patient still having right side hip pain that she thinks is causing both legs. X 3 weeks it is worsening. )      DANIELLE Garay is here today for complaints of bilateral leg pain.  She states that she is having increased pain in her legs for 3 weeks.  She is on palliative care and they have changed her pain medication to percocet recently.  She states the percocet is not helping her leg pain.  She denies this being restless leg and states the pain is different.  She states that the pain is constant with walking and sleeping on legs.  She states that with walking it is both legs and with sleeping it is only side she is sleeping on.  She states that the pain is from hip to the foot.  She states \"I feel it in the bone\".  She denies any recent injury.      Past Medical History:     Past Medical History:   Diagnosis Date    Arthritis     Asthma     COPD (chronic obstructive pulmonary disease) (HCC)     Eczema of face     Genital warts     Hypertension     Psychiatric problem     Restless legs     Sleep apnea     Stroke ([facility])       Reviewed all health maintenance requirements and ordered appropriate tests  Health Maintenance Due   Topic Date Due    Colorectal Cancer Screen  Never done    COVID-19 Vaccine (3 - 2023- season) 2023       Past Surgical History:     Past Surgical History:   Procedure Laterality Date    APPENDECTOMY      BACK SURGERY       SECTION

## 2024-03-12 NOTE — PROGRESS NOTES
Phone: [facility]           Fax: [phone]                           Outpatient Physical Therapy                                                                            Daily Note    Patient: Wanda Alvarez : 1965  CSN #: [id number]   Referring Physician: AIMEE Shah*  Date: 10/2/2023    Diagnosis: L hip pain, M25.552; L4-5 disc bulge, M51.36  Treatment Diagnosis: Low back pain    PT Insurance Information: [employer]  Total # of Visits Approved: 10 Per Physician Order  Total # of Visits to Date: 5  No Show: 3  Canceled Appointment: 1    10/06/23 Plan of Care/Recert Due    Pre-Treatment Pain:  7.5/10  Subjective: pt reports 7.5/10 L hip and LBP prior to treatment. pt reports she saw her PCP, happy with her results, no changes and reports to come back in 2 months for follow up. Exercises:  Exercise 1: HEP: glut sets, PPT's, LTR, SKTC, standing sink therex  Exercise 2: aq: in offloaded enviornment to reduce compressive forces:  Exercise 3: aq: Shallow water walking fwd, bwd and lateral x 3 laps each  Exercise 4: aq: Shallow sink therex x 15  Exercise 5: aq: PTB rows/extension x 15 each  Exercise 6: aq: Semi deep fwd step up and step down x 15  Exercise 7: aq: Seated bench jet massage to reduce muscle tension while performing piriformis stretch B LE 30\" x 3. Seated B LE nerve glide x 10 each. Exercise 8: aq: Step stretch HS 30\" x 3 B LE  Exercise 9: aq: Semi deep single dumbbell 90/90 and chest fly x 10 each  Exercise 10: aq: Deep well bike 2 minutes, relax 5 minutes    Assessment  Assessment: Continued to progress aquatic routine this date with increased LE reps and added deep well exercise this date. Also focused on pain relief with jet massage, stretches and deep well relaxation. Will continue to progress as tolerated. Pain reduced to 4-5/10 following tx. Patient to schedule 2 more visits, one with a PT for [facility].  Added standing sink therex to SUBJECTIVE:  Alejandra presents today for a follow-up evaluation of her left ankle sprain versus avulsion fracture.  Patient has been wearing her short-leg boot but she has still been partial weight-bearing with crutches.  She still having quite a bit of discomfort in her ankle.  All communication today is done through a video screen     OBJECTIVE:   Visit Vitals  Resp 16   Ht 5' 1\" (1.549 m)   Wt 66.7 kg (147 lb)   LMP 11/13/2023 (Exact Date) Comment: irregular at baseline   BMI 27.78 kg/m²     Exam reveals a well-nourished female in no acute distress alert and oriented x3 responds appropriately to questions shows appropriate mood and affect. Presents into the office today using her boot and crutches  On examination of her left lower extremity she does have +2 pedal and posterior tibial pulse her neurovascular status is intact distally she is nontender when you palpate along her calf.  She still has quite a bit of swelling throughout her ankle.  She is  with palpation along the distal fibula as well as the ATFL.    ASSESSMENT:  Alejandra is a 47 year old female status post left ankle sprain versus avulsion fracture    PLAN:  At this time patient will continue with her boot but progress weight-bearing as tolerated.  I will see her back in 3 weeks we will get a new x-ray at that visit.  If she has any other questions or concerns she can contact our office.  I did show her some gentle range-of-motion exercises I would like her to do in the next 3 weeks.  She is going to stay off of work as she is on her feet all day and unable to perform her job duties.

## 2024-03-14 ASSESSMENT — ENCOUNTER SYMPTOMS
EYES NEGATIVE: 1
ALLERGIC/IMMUNOLOGIC NEGATIVE: 1
GASTROINTESTINAL NEGATIVE: 1
RESPIRATORY NEGATIVE: 1

## 2024-03-26 ENCOUNTER — HOSPITAL ENCOUNTER (OUTPATIENT)
Dept: PHYSICAL THERAPY | Age: 59
Setting detail: THERAPIES SERIES
Discharge: HOME | End: 2024-03-26

## 2024-03-26 NOTE — PROGRESS NOTES
Physical Therapy  [facility]  Inpatient/Observation/Outpatient Rehabilitation    Date: 3/26/2024  Patient Name: Tanisha Coleman       [] Inpatient Acute/Observation       []  Outpatient  : 1965     Plan of Care/Recert ends    [x] Pt no showed for scheduled appointment    [] Pt refused/declined therapy at this time due to:           [] Pt cancelled due to:  [] No Reason Given   [] Sick/ill   [] Other:    [] Evaluation held by RN/Provider due to:    [] High Heart Rate   [] High Blood Pressure   [] Orthopedic Consult   [] Hgb < 7   [] Other:    [] Pt does not require skilled services due to:      Therapist/Assistant will attempt to see this patient, at our earliest opportunity.       Consuelo Phillips Date: 3/26/2024

## 2024-03-29 DIAGNOSIS — G25.81 RLS (RESTLESS LEGS SYNDROME): ICD-10-CM

## 2024-03-29 RX ORDER — ONDANSETRON 4 MG/1
TABLET, FILM COATED ORAL
Qty: 90 TABLET | Refills: 1 | Status: SHIPPED | OUTPATIENT
Start: 2024-03-29

## 2024-03-29 RX ORDER — METOPROLOL TARTRATE 50 MG/1
TABLET, FILM COATED ORAL
Qty: 90 TABLET | Refills: 1 | Status: SHIPPED | OUTPATIENT
Start: 2024-03-29

## 2024-03-29 NOTE — TELEPHONE ENCOUNTER
Health Maintenance   Topic Date Due    HIV screen  Never done    Hepatitis C screen  Never done    Colorectal Cancer Screen  Never done    DTaP/Tdap/Td vaccine (1 - Tdap) 07/10/2019    Shingles vaccine (2 of 2) 12/29/2020    COVID-19 Vaccine (3 - 2023-24 season) 09/01/2023    Pneumococcal 0-64 years Vaccine (2 of 2 - PPSV23 or PCV20) 04/27/2024 (Originally 12/29/2020)    Hepatitis B vaccine (1 of 3 - 3-dose series) 10/02/2024 (Originally 1965)    Lipids  01/21/2025    Depression Monitoring  03/12/2025    Breast cancer screen  05/10/2025    Cervical cancer screen  02/24/2027    Flu vaccine  Completed    Hepatitis A vaccine  Aged Out    Hib vaccine  Aged Out    Polio vaccine  Aged Out    Meningococcal (ACWY) vaccine  Aged Out    Diabetes screen  Discontinued             (applicable per patient's age: Cancer Screenings, Depression Screening, Fall Risk Screening, Immunizations)    Hemoglobin A1C (%)   Date Value   10/10/2023 5.1     LDL Cholesterol (mg/dL)   Date Value   01/21/2024 53     AST (U/L)   Date Value   01/18/2024 17     ALT (U/L)   Date Value   01/18/2024 9     BUN (mg/dL)   Date Value   01/18/2024 12      (goal A1C is < 7)   (goal LDL is <100) need 30-50% reduction from baseline     BP Readings from Last 3 Encounters:   03/12/24 120/86   01/29/24 114/84   01/18/24 97/67    (goal /80)      All Future Testing planned in [facility]:  Lab Frequency Next Occurrence   MRI CERVICAL SPINE WO CONTRAST Once 06/26/2023   CT CHEST ABDOMEN PELVIS WO CONTRAST Additional Contrast? Radiologist Recommendation Once 10/10/2023   Iron and TIBC Once 06/11/2024   Vitamin B12 & Folate Once 06/11/2024   Ferritin Once 06/11/2024   XR HIP RIGHT (2-3 VIEWS) Once 03/12/2024       Next Visit Date:  Future Appointments   Date Time Provider Department Center   4/3/2024 11:20 AM Maddi Starks, APRN - CNP [facility]            Patient Active Problem List:     Bipolar 1 disorder (HCC)     Chest pain in adult

## 2024-04-03 ENCOUNTER — OFFICE VISIT (OUTPATIENT)
Dept: PRIMARY CARE CLINIC | Age: 59
End: 2024-04-03
Payer: COMMERCIAL

## 2024-04-03 VITALS
BODY MASS INDEX: 39.93 KG/M2 | WEIGHT: 232.6 LBS | RESPIRATION RATE: 16 BRPM | HEART RATE: 67 BPM | OXYGEN SATURATION: 97 % | SYSTOLIC BLOOD PRESSURE: 124 MMHG | DIASTOLIC BLOOD PRESSURE: 82 MMHG | TEMPERATURE: 98.6 F

## 2024-04-03 DIAGNOSIS — M79.604 BILATERAL LEG PAIN: Primary | ICD-10-CM

## 2024-04-03 DIAGNOSIS — M25.551 BILATERAL HIP PAIN: ICD-10-CM

## 2024-04-03 DIAGNOSIS — M25.552 BILATERAL HIP PAIN: ICD-10-CM

## 2024-04-03 DIAGNOSIS — F41.9 ANXIETY: ICD-10-CM

## 2024-04-03 DIAGNOSIS — M79.605 BILATERAL LEG PAIN: Primary | ICD-10-CM

## 2024-04-03 PROCEDURE — 3079F DIAST BP 80-89 MM HG: CPT | Performed by: NURSE PRACTITIONER

## 2024-04-03 PROCEDURE — 3074F SYST BP LT 130 MM HG: CPT | Performed by: NURSE PRACTITIONER

## 2024-04-03 PROCEDURE — G8427 DOCREV CUR MEDS BY ELIG CLIN: HCPCS | Performed by: NURSE PRACTITIONER

## 2024-04-03 PROCEDURE — G8417 CALC BMI ABV UP PARAM F/U: HCPCS | Performed by: NURSE PRACTITIONER

## 2024-04-03 PROCEDURE — 1036F TOBACCO NON-USER: CPT | Performed by: NURSE PRACTITIONER

## 2024-04-03 PROCEDURE — 99214 OFFICE O/P EST MOD 30 MIN: CPT | Performed by: NURSE PRACTITIONER

## 2024-04-03 PROCEDURE — 3017F COLORECTAL CA SCREEN DOC REV: CPT | Performed by: NURSE PRACTITIONER

## 2024-04-03 RX ORDER — LORAZEPAM 0.5 MG/1
0.5 TABLET ORAL 2 TIMES DAILY PRN
Qty: 60 TABLET | Refills: 0 | Status: SHIPPED | OUTPATIENT
Start: 2024-04-03 | End: 2024-05-03

## 2024-04-03 SDOH — ECONOMIC STABILITY: FOOD INSECURITY: WITHIN THE PAST 12 MONTHS, YOU WORRIED THAT YOUR FOOD WOULD RUN OUT BEFORE YOU GOT MONEY TO BUY MORE.: NEVER TRUE

## 2024-04-03 SDOH — ECONOMIC STABILITY: FOOD INSECURITY: WITHIN THE PAST 12 MONTHS, THE FOOD YOU BOUGHT JUST DIDN'T LAST AND YOU DIDN'T HAVE MONEY TO GET MORE.: NEVER TRUE

## 2024-04-03 SDOH — ECONOMIC STABILITY: INCOME INSECURITY: HOW HARD IS IT FOR YOU TO PAY FOR THE VERY BASICS LIKE FOOD, HOUSING, MEDICAL CARE, AND HEATING?: NOT HARD AT ALL

## 2024-04-03 ASSESSMENT — PATIENT HEALTH QUESTIONNAIRE - PHQ9
2. FEELING DOWN, DEPRESSED OR HOPELESS: NOT AT ALL
10. IF YOU CHECKED OFF ANY PROBLEMS, HOW DIFFICULT HAVE THESE PROBLEMS MADE IT FOR YOU TO DO YOUR WORK, TAKE CARE OF THINGS AT HOME, OR GET ALONG WITH OTHER PEOPLE: NOT DIFFICULT AT ALL
1. LITTLE INTEREST OR PLEASURE IN DOING THINGS: NOT AT ALL
SUM OF ALL RESPONSES TO PHQ9 QUESTIONS 1 & 2: 0
SUM OF ALL RESPONSES TO PHQ QUESTIONS 1-9: 0
5. POOR APPETITE OR OVEREATING: NOT AT ALL
3. TROUBLE FALLING OR STAYING ASLEEP: NOT AT ALL
8. MOVING OR SPEAKING SO SLOWLY THAT OTHER PEOPLE COULD HAVE NOTICED. OR THE OPPOSITE, BEING SO FIGETY OR RESTLESS THAT YOU HAVE BEEN MOVING AROUND A LOT MORE THAN USUAL: NOT AT ALL
SUM OF ALL RESPONSES TO PHQ QUESTIONS 1-9: 0
SUM OF ALL RESPONSES TO PHQ QUESTIONS 1-9: 0
4. FEELING TIRED OR HAVING LITTLE ENERGY: NOT AT ALL
9. THOUGHTS THAT YOU WOULD BE BETTER OFF DEAD, OR OF HURTING YOURSELF: NOT AT ALL
SUM OF ALL RESPONSES TO PHQ QUESTIONS 1-9: 0
7. TROUBLE CONCENTRATING ON THINGS, SUCH AS READING THE NEWSPAPER OR WATCHING TELEVISION: NOT AT ALL
6. FEELING BAD ABOUT YOURSELF - OR THAT YOU ARE A FAILURE OR HAVE LET YOURSELF OR YOUR FAMILY DOWN: NOT AT ALL

## 2024-04-03 NOTE — PATIENT INSTRUCTIONS
SURVEY:     You may be receiving a survey from [facility] regarding your visit today.     Please complete the survey to enable us to provide the highest quality of care to you and your family.     If you cannot score us a very good on any question, please call the office to discuss how we could have made your experience a very good one.     Thank you,    Melchor Schneider, APRN-CNP  Maddi Starks, APRN-CNP  Hermelinda, LPN  Kirsten, CMA  Silvano, CMA  Linda, CMA  Leslie, PCA  Consuelo, CMA  Sonia, PM

## 2024-04-03 NOTE — PROGRESS NOTES
TSH 3.43 10/12/2023 10:15 PM     Lab Results   Component Value Date/Time    HDL 28 01/21/2024 07:41 AM    LABA1C 5.1 10/10/2023 12:41 PM       Assessment/Plan:      Diagnosis Orders   1. Bilateral leg pain  External Referral To Orthopedic Surgery      2. Bilateral hip pain  External Referral To Orthopedic Surgery      3. Anxiety  LORazepam (ATIVAN) 0.5 MG tablet        Encouraged Tanisha to complete her xray and follow up with physical therapy.  Referral to Orthopedics for further evaluation and treatment.    Ativan 0.5 mg BID prn as prescribed for increased anxiety.    Controlled Substance Monitoring:    Acute and Chronic Pain Monitoring:   RX Monitoring Periodic Controlled Substance Monitoring   4/3/2024   1:21 PM No signs of potential drug abuse or diversion identified.         1.  Tanisha received counseling on healthy behaviors and Education discussed during visit.  2.  Patient given educational materials - see patient instructions  3.  Patient was provided AVS.  4.  Discussed use, benefit, and side effects of prescribed medications.  Barriers to medication compliance addressed.  All patient questions answered.Pt voiced understanding.   5.  Reviewed prior labs and health maintenance  6.  Continue current medications, diet and exercise.    Completed Refills   Requested Prescriptions     Signed Prescriptions Disp Refills    LORazepam (ATIVAN) 0.5 MG tablet 60 tablet 0     Sig: Take 1 tablet by mouth 2 times daily as needed for Anxiety for up to 30 days. Max Daily Amount: 1 mg         Return in about 3 months (around 7/3/2024).

## 2024-04-10 ENCOUNTER — HOSPITAL ENCOUNTER (OUTPATIENT)
Age: 59
Discharge: HOME | End: 2024-04-12
Payer: COMMERCIAL

## 2024-04-10 ENCOUNTER — HOSPITAL ENCOUNTER (OUTPATIENT)
Dept: GENERAL RADIOLOGY | Age: 59
Discharge: HOME | End: 2024-04-12
Payer: COMMERCIAL

## 2024-04-10 DIAGNOSIS — M25.552 BILATERAL HIP PAIN: ICD-10-CM

## 2024-04-10 DIAGNOSIS — M25.551 BILATERAL HIP PAIN: ICD-10-CM

## 2024-04-10 PROCEDURE — 73502 X-RAY EXAM HIP UNI 2-3 VIEWS: CPT

## 2024-04-11 ENCOUNTER — TELEPHONE (OUTPATIENT)
Dept: PRIMARY CARE CLINIC | Age: 59
End: 2024-04-11

## 2024-04-11 NOTE — TELEPHONE ENCOUNTER
----- Message from AIMEE Elliott - CNP sent at 4/11/2024  8:17 AM EDT -----  Please notify patient of normal xray results.  Thanks Maddi   PAST SURGICAL HISTORY:  S/P hip replacement     S/P knee replacement, bilateral     S/P lumpectomy, right breast

## 2024-04-13 ENCOUNTER — HOSPITAL ENCOUNTER (EMERGENCY)
Age: 59
Discharge: HOME | End: 2024-04-13
Attending: STUDENT IN AN ORGANIZED HEALTH CARE EDUCATION/TRAINING PROGRAM
Payer: COMMERCIAL

## 2024-04-13 VITALS
SYSTOLIC BLOOD PRESSURE: 113 MMHG | HEART RATE: 84 BPM | DIASTOLIC BLOOD PRESSURE: 74 MMHG | OXYGEN SATURATION: 95 % | TEMPERATURE: 98.5 F | RESPIRATION RATE: 19 BRPM

## 2024-04-13 DIAGNOSIS — G89.29 ACUTE EXACERBATION OF CHRONIC LOW BACK PAIN: Primary | ICD-10-CM

## 2024-04-13 DIAGNOSIS — M79.604 BILATERAL LEG PAIN: ICD-10-CM

## 2024-04-13 DIAGNOSIS — M54.50 ACUTE EXACERBATION OF CHRONIC LOW BACK PAIN: Primary | ICD-10-CM

## 2024-04-13 DIAGNOSIS — M79.605 BILATERAL LEG PAIN: ICD-10-CM

## 2024-04-13 PROCEDURE — 6360000002 HC RX W HCPCS: Performed by: STUDENT IN AN ORGANIZED HEALTH CARE EDUCATION/TRAINING PROGRAM

## 2024-04-13 PROCEDURE — 96374 THER/PROPH/DIAG INJ IV PUSH: CPT

## 2024-04-13 PROCEDURE — 96375 TX/PRO/DX INJ NEW DRUG ADDON: CPT

## 2024-04-13 PROCEDURE — 99285 EMERGENCY DEPT VISIT HI MDM: CPT

## 2024-04-13 RX ORDER — KETOROLAC TROMETHAMINE 30 MG/ML
30 INJECTION, SOLUTION INTRAMUSCULAR; INTRAVENOUS ONCE
Status: COMPLETED | OUTPATIENT
Start: 2024-04-13 | End: 2024-04-13

## 2024-04-13 RX ORDER — FENTANYL CITRATE 50 UG/ML
50 INJECTION, SOLUTION INTRAMUSCULAR; INTRAVENOUS ONCE
Status: COMPLETED | OUTPATIENT
Start: 2024-04-13 | End: 2024-04-13

## 2024-04-13 RX ORDER — ORPHENADRINE CITRATE 30 MG/ML
60 INJECTION INTRAMUSCULAR; INTRAVENOUS ONCE
Status: COMPLETED | OUTPATIENT
Start: 2024-04-13 | End: 2024-04-13

## 2024-04-13 RX ADMIN — ORPHENADRINE CITRATE 60 MG: 60 INJECTION INTRAMUSCULAR; INTRAVENOUS at 01:14

## 2024-04-13 RX ADMIN — FENTANYL CITRATE 50 MCG: 50 INJECTION INTRAMUSCULAR; INTRAVENOUS at 01:14

## 2024-04-13 RX ADMIN — KETOROLAC TROMETHAMINE 30 MG: 30 INJECTION, SOLUTION INTRAMUSCULAR; INTRAVENOUS at 01:08

## 2024-04-13 ASSESSMENT — ENCOUNTER SYMPTOMS
ABDOMINAL PAIN: 0
VOMITING: 0
EYES NEGATIVE: 1
NAUSEA: 0
RESPIRATORY NEGATIVE: 1

## 2024-04-13 NOTE — ED PROVIDER NOTES
[facility] ED  Emergency Department Encounter  Emergency Medicine Attending     Pt Name:Tanisha Coleman  MRN: [id number]  Birthdate 1965  Date of evaluation: 24  PCP:  Maddi Starks APRN - CNP  Note Started: 1:26 AM EDT      CHIEF COMPLAINT       Chief Complaint   Patient presents with    Pain     Pt states chronic pain in legs and back worse today. Pt states established with palliative care for pain control.        HISTORY OF PRESENT ILLNESS  (Location/Symptom, Timing/Onset, Context/Setting, Quality, Duration, Modifying Factors, Severity.)      Tanisha Coleman is a 58 y.o. female who presents with acute worsening of chronic pain.  Patient is on palliative care for severe low back and leg pain.  Patient was also just recently started on additional medication to help control her symptoms.  Patient states that she took all of her medication this evening and despite this she has having breakthrough pain.  Patient states the pain has not in any abnormal way or abnormal placed on her but it has become more severe that she is normally able to tolerate.  Patient denies any traumatic injuries, more movement than normal, nausea or vomiting, chest pain or any other difficulties or changes    PAST MEDICAL / SURGICAL / SOCIAL / FAMILY HISTORY      has a past medical history of Arthritis, Asthma, COPD (chronic obstructive pulmonary disease) ([facility]), Eczema of face, Genital warts, Hypertension, Psychiatric problem, Restless legs, Sleep apnea, and Stroke ([facility]).       has a past surgical history that includes back surgery; lumbar fusion; Total knee arthroplasty (Left);  section; Kidney surgery (Right); Appendectomy; Tonsillectomy; and Cholecystectomy.      Social History     Socioeconomic History    Marital status:      Spouse name: Not on file    Number of children: Not on file    Years of education: Not on file    Highest education level: Not on file   Occupational History    Not on

## 2024-04-13 NOTE — DISCHARGE INSTRUCTIONS
Please return to the emergency department worsening symptoms or unable to be controlled by your care medications.  Please follow-up with your earliest possible convenience.  Otherwise follow-up with your regular intervals

## 2024-04-15 ENCOUNTER — TELEPHONE (OUTPATIENT)
Dept: PRIMARY CARE CLINIC | Age: 59
End: 2024-04-15

## 2024-04-15 NOTE — TELEPHONE ENCOUNTER
[facility] Transitions Initial Follow Up Call    Outreach made within 2 business days of discharge: Yes    Patient: Tanisha Coleman Patient : 1965   MRN: [id number]  Reason for Admission: There are no discharge diagnoses documented for the most recent discharge.  Discharge Date: 24       Spoke with: Tanisha     Discharge department/facility: [facility]     TCM Interactive Patient Contact:  Was patient able to fill all prescriptions: Yes  Was patient instructed to bring all medications to the follow-up visit: Yes  Is patient taking all medications as directed in the discharge summary? Yes  Does patient understand their discharge instructions: Yes  Does patient have questions or concerns that need addressed prior to 7-14 day follow up office visit: no    Scheduled appointment with PCP within 7-14 days    Follow Up  Future Appointments   Date Time Provider Department Center   2024  2:00 PM Jim Galeano, PT [facility]   2024 11:20 AM Maddi Starks, APRN - CNP [facility]       Linda Johnston MA

## 2024-05-08 ENCOUNTER — HOSPITAL ENCOUNTER (OUTPATIENT)
Age: 59
Discharge: HOME | End: 2024-05-08

## 2024-05-20 DIAGNOSIS — F41.9 ANXIETY: ICD-10-CM

## 2024-05-21 ENCOUNTER — HOSPITAL ENCOUNTER (EMERGENCY)
Age: 59
Discharge: HOME | End: 2024-05-21
Attending: EMERGENCY MEDICINE
Payer: COMMERCIAL

## 2024-05-21 ENCOUNTER — TELEPHONE (OUTPATIENT)
Dept: PRIMARY CARE CLINIC | Age: 59
End: 2024-05-21

## 2024-05-21 VITALS
HEIGHT: 64 IN | TEMPERATURE: 97.6 F | DIASTOLIC BLOOD PRESSURE: 104 MMHG | HEART RATE: 65 BPM | BODY MASS INDEX: 38.24 KG/M2 | OXYGEN SATURATION: 97 % | SYSTOLIC BLOOD PRESSURE: 143 MMHG | RESPIRATION RATE: 18 BRPM | WEIGHT: 224 LBS

## 2024-05-21 DIAGNOSIS — R30.0 DYSURIA: Primary | ICD-10-CM

## 2024-05-21 DIAGNOSIS — G89.4 CHRONIC PAIN SYNDROME: ICD-10-CM

## 2024-05-21 LAB
BILIRUB UR QL STRIP: NEGATIVE
CLARITY UR: CLEAR
COLOR UR: YELLOW
EPI CELLS #/AREA URNS HPF: ABNORMAL /HPF (ref 0–25)
GLUCOSE UR STRIP-MCNC: NEGATIVE MG/DL
HGB UR QL STRIP.AUTO: NEGATIVE
KETONES UR STRIP-MCNC: NEGATIVE MG/DL
LEUKOCYTE ESTERASE UR QL STRIP: NEGATIVE
MUCOUS THREADS URNS QL MICRO: ABNORMAL
NITRITE UR QL STRIP: NEGATIVE
PH UR STRIP: 6 [PH] (ref 5–9)
PROT UR STRIP-MCNC: NEGATIVE MG/DL
RBC #/AREA URNS HPF: ABNORMAL /HPF (ref 0–2)
SP GR UR STRIP: >1.03 (ref 1.01–1.02)
UROBILINOGEN UR STRIP-ACNC: NORMAL EU/DL (ref 0–1)
WBC #/AREA URNS HPF: ABNORMAL /HPF (ref 0–5)

## 2024-05-21 PROCEDURE — 6370000000 HC RX 637 (ALT 250 FOR IP): Performed by: EMERGENCY MEDICINE

## 2024-05-21 PROCEDURE — 99283 EMERGENCY DEPT VISIT LOW MDM: CPT

## 2024-05-21 PROCEDURE — 87086 URINE CULTURE/COLONY COUNT: CPT

## 2024-05-21 PROCEDURE — 81001 URINALYSIS AUTO W/SCOPE: CPT

## 2024-05-21 RX ORDER — LORAZEPAM 0.5 MG/1
0.5 TABLET ORAL 2 TIMES DAILY PRN
Qty: 60 TABLET | Refills: 0 | Status: SHIPPED | OUTPATIENT
Start: 2024-05-21 | End: 2024-06-20

## 2024-05-21 RX ORDER — ONDANSETRON 4 MG/1
4 TABLET, ORALLY DISINTEGRATING ORAL ONCE
Status: COMPLETED | OUTPATIENT
Start: 2024-05-21 | End: 2024-05-21

## 2024-05-21 RX ADMIN — ONDANSETRON 4 MG: 4 TABLET, ORALLY DISINTEGRATING ORAL at 01:59

## 2024-05-21 ASSESSMENT — ENCOUNTER SYMPTOMS
NAUSEA: 1
VOMITING: 0
ABDOMINAL PAIN: 0
BACK PAIN: 1
SHORTNESS OF BREATH: 0

## 2024-05-21 ASSESSMENT — PAIN SCALES - GENERAL: PAINLEVEL_OUTOF10: 10

## 2024-05-21 ASSESSMENT — PAIN - FUNCTIONAL ASSESSMENT: PAIN_FUNCTIONAL_ASSESSMENT: 0-10

## 2024-05-21 ASSESSMENT — PAIN DESCRIPTION - PAIN TYPE: TYPE: CHRONIC PAIN

## 2024-05-21 ASSESSMENT — PAIN DESCRIPTION - ORIENTATION: ORIENTATION: LEFT;RIGHT

## 2024-05-21 ASSESSMENT — PAIN DESCRIPTION - LOCATION: LOCATION: LEG

## 2024-05-21 NOTE — TELEPHONE ENCOUNTER
[facility] Transitions Initial Follow Up Call    Outreach made within 2 business days of discharge: Yes    Patient: Tanisha Coleman Patient : 1965   MRN: [id number]  Reason for Admission: There are no discharge diagnoses documented for the most recent discharge.  Discharge Date: 24       Spoke with: phone number is not working     Discharge department/facility: [facility]     TCM Interactive Patient Contact:  Was patient able to fill all prescriptions:   Was patient instructed to bring all medications to the follow-up visit:   Is patient taking all medications as directed in the discharge summary?   Does patient understand their discharge instructions:   Does patient have questions or concerns that need addressed prior to 7-14 day follow up office visit:     Scheduled appointment with PCP within 7-14 days    Follow Up  Future Appointments   Date Time Provider Department Center   2024 11:20 AM Maddi Starks, APRN - CNP [facility]       Linda Johnston MA

## 2024-05-21 NOTE — ED TRIAGE NOTES
Per pt, c/o increased pain, chronic, prescribed Percocet 7.5 mg Q4. Also states she is awaiting an Ativan refill, called PCP yesterday. Last c/o includes painful urination, onset approx 2 hrs PTA. States she drove self. VSS, afebrile. Able to provide urine sample in triage.

## 2024-05-21 NOTE — ED NOTES
Patient requested to talk to supervisor.  Upon walking into room, patient starts talking loudly about not getting treated for pain.  Patient then asks why she couldn't have Ativan.  Explained to patient, since she drove she is unable to get Ativan or a narcotic d/t liability of administrating them with driving.  Encouraged patient to have someone drive her and for her to take percocet at home. Patient starts yelling and states will be calling own doctor and walked out after receiving discharge instructions

## 2024-05-21 NOTE — DISCHARGE INSTRUCTIONS
Your urine test shows no evidence of an infection. However, a culture has been ordered and is pending; if there is infection based on the culture you will be contacted and started on antibiotics.    Return to the ED for worsening pain, development of fever, abdominal pain or other concerns.

## 2024-05-21 NOTE — ED PROVIDER NOTES
[facility]  EMERGENCY DEPARTMENT ENCOUNTER      Pt Name: Tanisha Coleman  MRN: [id number]  Birthdate 1965  Date of evaluation: 2024  Provider: Ranjan Johnston MD    CHIEF COMPLAINT       Chief Complaint   Patient presents with    Dysuria    Pain         HISTORY OF PRESENT ILLNESS      Tanisha Coleman is a 58 y.o. female who presents to the emergency department for relation of dysuria and back/leg pain.    She notes the dysuria has been present for about a day.  Present only after completion of her urine stream.  No  complaints.    Denies any abdominal pain or suprapubic pain.    Patient does complain of pain to her back, hips and legs, which she notes is chronic.  States that she typically takes Percocet, 7.5 mg, for this pain but it has not fixed the pain this evening.  There is no acute worsening of the pain and no new features to the pain.    No change in strength or sensation.  No fever.  She has had some nausea but no vomiting.  No chest pain or dyspnea.  She has no other complaints at this time.      REVIEW OF SYSTEMS       Review of Systems   Constitutional:  Negative for fever.   Respiratory:  Negative for shortness of breath.    Cardiovascular:  Negative for chest pain.   Gastrointestinal:  Positive for nausea. Negative for abdominal pain and vomiting.   Genitourinary:  Positive for dysuria. Negative for difficulty urinating, frequency, urgency, vaginal bleeding, vaginal discharge and vaginal pain.   Musculoskeletal:  Positive for back pain (Chronic, unchanged).   Neurological: Negative.          PAST MEDICAL HISTORY     Past Medical History:   Diagnosis Date    Arthritis     Asthma     COPD (chronic obstructive pulmonary disease) (HCC)     Eczema of face     Genital warts     Hypertension     Psychiatric problem     Restless legs     Sleep apnea     Stroke (HCC)          SURGICAL HISTORY       Past Surgical History:   Procedure Laterality Date    APPENDECTOMY      BACK SURGERY

## 2024-05-22 LAB
MICROORGANISM SPEC CULT: NORMAL
SPECIMEN DESCRIPTION: NORMAL

## 2024-06-17 ENCOUNTER — OFFICE VISIT (OUTPATIENT)
Dept: PRIMARY CARE CLINIC | Age: 59
End: 2024-06-17
Payer: COMMERCIAL

## 2024-06-17 VITALS
WEIGHT: 233.4 LBS | RESPIRATION RATE: 16 BRPM | BODY MASS INDEX: 40.06 KG/M2 | DIASTOLIC BLOOD PRESSURE: 70 MMHG | TEMPERATURE: 98.6 F | SYSTOLIC BLOOD PRESSURE: 126 MMHG | HEART RATE: 65 BPM | OXYGEN SATURATION: 98 %

## 2024-06-17 DIAGNOSIS — Z71.89 ACP (ADVANCE CARE PLANNING): ICD-10-CM

## 2024-06-17 DIAGNOSIS — Z12.11 COLON CANCER SCREENING: ICD-10-CM

## 2024-06-17 DIAGNOSIS — Z00.00 ENCOUNTER FOR WELL ADULT EXAM WITHOUT ABNORMAL FINDINGS: Primary | ICD-10-CM

## 2024-06-17 DIAGNOSIS — Z12.31 OTHER SCREENING MAMMOGRAM: ICD-10-CM

## 2024-06-17 PROCEDURE — 3074F SYST BP LT 130 MM HG: CPT | Performed by: NURSE PRACTITIONER

## 2024-06-17 PROCEDURE — 99396 PREV VISIT EST AGE 40-64: CPT | Performed by: NURSE PRACTITIONER

## 2024-06-17 PROCEDURE — 3078F DIAST BP <80 MM HG: CPT | Performed by: NURSE PRACTITIONER

## 2024-06-17 ASSESSMENT — ENCOUNTER SYMPTOMS
RESPIRATORY NEGATIVE: 1
EYES NEGATIVE: 1
GASTROINTESTINAL NEGATIVE: 1

## 2024-06-17 ASSESSMENT — PATIENT HEALTH QUESTIONNAIRE - PHQ9
9. THOUGHTS THAT YOU WOULD BE BETTER OFF DEAD, OR OF HURTING YOURSELF: NOT AT ALL
1. LITTLE INTEREST OR PLEASURE IN DOING THINGS: NOT AT ALL
3. TROUBLE FALLING OR STAYING ASLEEP: NOT AT ALL
4. FEELING TIRED OR HAVING LITTLE ENERGY: NOT AT ALL
8. MOVING OR SPEAKING SO SLOWLY THAT OTHER PEOPLE COULD HAVE NOTICED. OR THE OPPOSITE, BEING SO FIGETY OR RESTLESS THAT YOU HAVE BEEN MOVING AROUND A LOT MORE THAN USUAL: NOT AT ALL
7. TROUBLE CONCENTRATING ON THINGS, SUCH AS READING THE NEWSPAPER OR WATCHING TELEVISION: NOT AT ALL
5. POOR APPETITE OR OVEREATING: NOT AT ALL
10. IF YOU CHECKED OFF ANY PROBLEMS, HOW DIFFICULT HAVE THESE PROBLEMS MADE IT FOR YOU TO DO YOUR WORK, TAKE CARE OF THINGS AT HOME, OR GET ALONG WITH OTHER PEOPLE: NOT DIFFICULT AT ALL
SUM OF ALL RESPONSES TO PHQ QUESTIONS 1-9: 0
SUM OF ALL RESPONSES TO PHQ QUESTIONS 1-9: 0
2. FEELING DOWN, DEPRESSED OR HOPELESS: NOT AT ALL
SUM OF ALL RESPONSES TO PHQ QUESTIONS 1-9: 0
SUM OF ALL RESPONSES TO PHQ QUESTIONS 1-9: 0
SUM OF ALL RESPONSES TO PHQ9 QUESTIONS 1 & 2: 0

## 2024-06-17 NOTE — PROGRESS NOTES
Well Adult Note  Name: Tanisha Coleman Today’s Date: 2024   MRN: [id number] Sex: Female   Age: 58 y.o. Ethnicity: Non- / Non    : 1965 Race: White (non-)      Tanisha Coleman is here for well adult exam.  History:  Tanisha is here today for a wellness exam.  She has a history of Bipolar disorder, Hypertension, and chronic pain.  She states she has been having some increased anxiety recently and has an appointment with counselor this week.    Review of Systems   Constitutional: Negative.    HENT: Negative.     Eyes: Negative.    Respiratory: Negative.     Cardiovascular: Negative.    Gastrointestinal: Negative.    Endocrine: Negative.    Genitourinary: Negative.    Musculoskeletal:  Positive for arthralgias and myalgias.   Skin: Negative.    Allergic/Immunologic: Positive for environmental allergies.   Neurological: Negative.    Hematological: Negative.    Psychiatric/Behavioral:  Positive for dysphoric mood. The patient is nervous/anxious.        Allergies   Allergen Reactions    Amitriptyline      Other reaction(s): Confusion, Palpitations    Iodides Anaphylaxis    Penicillins Hives    Gabapentin     Pregabalin      Other reaction(s): Hallucinations    Sulfa Antibiotics      Other reaction(s): Vomiting and HIves  Nausea and vomiting      Venlafaxine      Other reaction(s): Tachycardia    Allobarbital     Oxycodone      Pt states getting nauseas          Prior to Visit Medications    Medication Sig Taking? Authorizing Provider   LORazepam (ATIVAN) 0.5 MG tablet Take 1 tablet by mouth 2 times daily as needed for Anxiety for up to 30 days. Max Daily Amount: 1 mg Yes Maddi Starks APRN - CNP   ondansetron (ZOFRAN) 4 MG tablet TAKE 1 TABLET BY MOUTH EVERY 8 HOURS AS NEEDED FOR NAUSEA AND/OR VOMITING Yes Maddi Starks APRN - CNP   metoprolol tartrate (LOPRESSOR) 50 MG tablet TAKE 1 TABLET BY MOUTH DAILY Yes Maddi Starks APRN - CNP   oxyCODONE-acetaminophen (PERCOCET)

## 2024-06-17 NOTE — PATIENT INSTRUCTIONS
pregnancy. Talk with your doctor about your choices and what might be best for you.   Prevent problems where you can. Protect your skin from too much sun, wash your hands, brush your teeth twice a day, and wear a seat belt in the car.   Where can you learn more?  Go to https://www.[employer].net/patientEd and enter P072 to learn more about \"Well Visit, Ages 18 to 65: Care Instructions.\"  Current as of: August 6, 2023  Content Version: 14.1  © 0034-0576 [employer].   Care instructions adapted under license by [employer]. If you have questions about a medical condition or this instruction, always ask your healthcare professional. [employer] disclaims any warranty or liability for your use of this information.           A Healthy Lifestyle: Care Instructions  A healthy lifestyle can help you feel good, have more energy, and stay at a weight that's healthy for you. You can share a healthy lifestyle with your friends and family. And you can do it on your own.    Eat meals with your friends or family. You could try cooking together.   Plan activities with other people. Go for a walk with a friend, try a free online fitness class, or join a sports league.     Eat a variety of healthy foods. These include fruits, vegetables, whole grains, low-fat dairy, and lean protein.   Choose healthy portions of food. You can use the Nutrition Facts label on food packages as a guide.     Eat more fruits and vegetables. You could add vegetables to sandwiches or add fruit to cereal.   Drink water when you are thirsty. Limit soda, juice, and sports drinks.     Try to exercise most days. Aim for at least 2½ hours of exercise each week.   Keep moving. Work in the garden or take your dog on a walk. Use the stairs instead of the elevator.     If you use tobacco or nicotine, try to quit. Ask your doctor about programs and medicines to help you quit.   Limit alcohol. Men should have no more than 2 drinks a day.

## 2024-06-20 ENCOUNTER — HOSPITAL ENCOUNTER (OUTPATIENT)
Dept: WOMENS IMAGING | Age: 59
Discharge: HOME | End: 2024-06-22
Payer: COMMERCIAL

## 2024-06-20 ENCOUNTER — TELEPHONE (OUTPATIENT)
Dept: PRIMARY CARE CLINIC | Age: 59
End: 2024-06-20

## 2024-06-20 DIAGNOSIS — Z12.31 OTHER SCREENING MAMMOGRAM: ICD-10-CM

## 2024-06-20 PROCEDURE — 77063 BREAST TOMOSYNTHESIS BI: CPT

## 2024-06-20 NOTE — TELEPHONE ENCOUNTER
----- Message from AIMEE Lin CNP sent at 6/20/2024  4:41 PM EDT -----  Results are normal, please call patient and make them aware.

## 2024-06-26 DIAGNOSIS — G25.81 RLS (RESTLESS LEGS SYNDROME): ICD-10-CM

## 2024-06-26 DIAGNOSIS — F41.9 ANXIETY: ICD-10-CM

## 2024-06-26 RX ORDER — LORAZEPAM 0.5 MG/1
0.5 TABLET ORAL 2 TIMES DAILY PRN
Qty: 60 TABLET | Refills: 0 | Status: SHIPPED | OUTPATIENT
Start: 2024-06-26 | End: 2024-07-26

## 2024-06-26 RX ORDER — ROPINIROLE 2 MG/1
TABLET, FILM COATED ORAL
Qty: 180 TABLET | Refills: 0 | Status: SHIPPED | OUTPATIENT
Start: 2024-06-26

## 2024-06-26 NOTE — TELEPHONE ENCOUNTER
Patient Active Problem List:     Bipolar 1 disorder (HCC)     Chest pain in adult     Hypertension     Depression with suicidal ideation

## 2024-08-08 ENCOUNTER — APPOINTMENT (OUTPATIENT)
Dept: CT IMAGING | Age: 59
DRG: 463 | End: 2024-08-08
Payer: COMMERCIAL

## 2024-08-08 ENCOUNTER — HOSPITAL ENCOUNTER (EMERGENCY)
Age: 59
Discharge: HOME | DRG: 463 | End: 2024-08-08
Attending: EMERGENCY MEDICINE
Payer: COMMERCIAL

## 2024-08-08 ENCOUNTER — TELEPHONE (OUTPATIENT)
Dept: PRIMARY CARE CLINIC | Age: 59
End: 2024-08-08

## 2024-08-08 ENCOUNTER — HOSPITAL ENCOUNTER (INPATIENT)
Age: 59
LOS: 3 days | Discharge: HOME | DRG: 463 | End: 2024-08-11
Attending: EMERGENCY MEDICINE | Admitting: STUDENT IN AN ORGANIZED HEALTH CARE EDUCATION/TRAINING PROGRAM
Payer: COMMERCIAL

## 2024-08-08 VITALS
WEIGHT: 230 LBS | SYSTOLIC BLOOD PRESSURE: 124 MMHG | TEMPERATURE: 98.3 F | BODY MASS INDEX: 39.27 KG/M2 | HEIGHT: 64 IN | HEART RATE: 70 BPM | RESPIRATION RATE: 16 BRPM | OXYGEN SATURATION: 92 % | DIASTOLIC BLOOD PRESSURE: 57 MMHG

## 2024-08-08 DIAGNOSIS — R10.9 FLANK PAIN: Primary | ICD-10-CM

## 2024-08-08 DIAGNOSIS — N10 ACUTE PYELONEPHRITIS: ICD-10-CM

## 2024-08-08 DIAGNOSIS — J02.9 SORE THROAT: Primary | ICD-10-CM

## 2024-08-08 PROBLEM — Z96.0 STATUS POST CYSTOSCOPY WITH URETERAL STENT PLACEMENT: Status: ACTIVE | Noted: 2024-08-08

## 2024-08-08 LAB
ALBUMIN SERPL-MCNC: 4.4 G/DL (ref 3.5–5.2)
ALBUMIN/GLOB SERPL: 1.3 {RATIO} (ref 1–2.5)
ALP SERPL-CCNC: 81 U/L (ref 35–104)
ALT SERPL-CCNC: 10 U/L (ref 5–33)
ANION GAP SERPL CALCULATED.3IONS-SCNC: 13 MMOL/L (ref 9–17)
AST SERPL-CCNC: 16 U/L
BACTERIA URNS QL MICRO: ABNORMAL
BASOPHILS # BLD: 0.1 K/UL (ref 0–0.2)
BASOPHILS NFR BLD: 1 % (ref 0–2)
BILIRUB SERPL-MCNC: 0.3 MG/DL (ref 0.3–1.2)
BILIRUB UR QL STRIP: ABNORMAL
BUN SERPL-MCNC: 11 MG/DL (ref 6–20)
BUN/CREAT SERPL: 16 (ref 9–20)
CALCIUM SERPL-MCNC: 9.2 MG/DL (ref 8.6–10.4)
CHLORIDE SERPL-SCNC: 97 MMOL/L (ref 98–107)
CLARITY UR: ABNORMAL
CO2 SERPL-SCNC: 29 MMOL/L (ref 20–31)
COLOR UR: ABNORMAL
CREAT SERPL-MCNC: 0.7 MG/DL (ref 0.5–0.9)
EOSINOPHIL # BLD: <0.03 K/UL (ref 0–0.44)
EOSINOPHILS RELATIVE PERCENT: 0 % (ref 1–4)
EPI CELLS #/AREA URNS HPF: ABNORMAL /HPF (ref 0–25)
ERYTHROCYTE [DISTWIDTH] IN BLOOD BY AUTOMATED COUNT: 12.7 % (ref 11.8–14.4)
GFR, ESTIMATED: >90 ML/MIN/1.73M2
GLUCOSE SERPL-MCNC: 119 MG/DL (ref 70–99)
GLUCOSE UR STRIP-MCNC: ABNORMAL MG/DL
HCT VFR BLD AUTO: 46.7 % (ref 36.3–47.1)
HGB BLD-MCNC: 15.6 G/DL (ref 11.9–15.1)
HGB UR QL STRIP.AUTO: ABNORMAL
IMM GRANULOCYTES # BLD AUTO: 0.34 K/UL (ref 0–0.3)
IMM GRANULOCYTES NFR BLD: 2 %
KETONES UR STRIP-MCNC: NEGATIVE MG/DL
LEUKOCYTE ESTERASE UR QL STRIP: ABNORMAL
LYMPHOCYTES NFR BLD: 2.16 K/UL (ref 1.1–3.7)
LYMPHOCYTES RELATIVE PERCENT: 12 % (ref 24–43)
MCH RBC QN AUTO: 31.2 PG (ref 25.2–33.5)
MCHC RBC AUTO-ENTMCNC: 33.4 G/DL (ref 28.4–34.8)
MCV RBC AUTO: 93.4 FL (ref 82.6–102.9)
MONOCYTES NFR BLD: 0.87 K/UL (ref 0.1–1.2)
MONOCYTES NFR BLD: 5 % (ref 3–12)
NEUTROPHILS NFR BLD: 80 % (ref 36–65)
NEUTS SEG NFR BLD: 14.89 K/UL (ref 1.5–8.1)
NITRITE UR QL STRIP: ABNORMAL
NRBC BLD-RTO: 0 PER 100 WBC
PH UR STRIP: ABNORMAL [PH] (ref 5–9)
PLATELET # BLD AUTO: 296 K/UL (ref 138–453)
PMV BLD AUTO: 10.4 FL (ref 8.1–13.5)
POTASSIUM SERPL-SCNC: 4.8 MMOL/L (ref 3.7–5.3)
PROT SERPL-MCNC: 7.9 G/DL (ref 6.4–8.3)
PROT UR STRIP-MCNC: ABNORMAL MG/DL
RBC # BLD AUTO: 5 M/UL (ref 3.95–5.11)
RBC #/AREA URNS HPF: ABNORMAL /HPF (ref 0–2)
SODIUM SERPL-SCNC: 139 MMOL/L (ref 135–144)
SP GR UR STRIP: >1.03 (ref 1.01–1.02)
UROBILINOGEN UR STRIP-ACNC: ABNORMAL EU/DL (ref 0–1)
WBC #/AREA URNS HPF: ABNORMAL /HPF (ref 0–5)
WBC OTHER # BLD: 18.4 K/UL (ref 3.5–11.3)

## 2024-08-08 PROCEDURE — 1200000000 HC SEMI PRIVATE

## 2024-08-08 PROCEDURE — 6360000002 HC RX W HCPCS: Performed by: EMERGENCY MEDICINE

## 2024-08-08 PROCEDURE — 6370000000 HC RX 637 (ALT 250 FOR IP): Performed by: INTERNAL MEDICINE

## 2024-08-08 PROCEDURE — 96361 HYDRATE IV INFUSION ADD-ON: CPT

## 2024-08-08 PROCEDURE — 6370000000 HC RX 637 (ALT 250 FOR IP)

## 2024-08-08 PROCEDURE — 99284 EMERGENCY DEPT VISIT MOD MDM: CPT

## 2024-08-08 PROCEDURE — 70490 CT SOFT TISSUE NECK W/O DYE: CPT

## 2024-08-08 PROCEDURE — 96375 TX/PRO/DX INJ NEW DRUG ADDON: CPT

## 2024-08-08 PROCEDURE — 87086 URINE CULTURE/COLONY COUNT: CPT

## 2024-08-08 PROCEDURE — 96376 TX/PRO/DX INJ SAME DRUG ADON: CPT

## 2024-08-08 PROCEDURE — 81001 URINALYSIS AUTO W/SCOPE: CPT

## 2024-08-08 PROCEDURE — 80053 COMPREHEN METABOLIC PANEL: CPT

## 2024-08-08 PROCEDURE — 2580000003 HC RX 258: Performed by: INTERNAL MEDICINE

## 2024-08-08 PROCEDURE — 94640 AIRWAY INHALATION TREATMENT: CPT

## 2024-08-08 PROCEDURE — 85025 COMPLETE CBC W/AUTO DIFF WBC: CPT

## 2024-08-08 PROCEDURE — 96374 THER/PROPH/DIAG INJ IV PUSH: CPT

## 2024-08-08 PROCEDURE — 94761 N-INVAS EAR/PLS OXIMETRY MLT: CPT

## 2024-08-08 PROCEDURE — 6370000000 HC RX 637 (ALT 250 FOR IP): Performed by: EMERGENCY MEDICINE

## 2024-08-08 PROCEDURE — 94664 DEMO&/EVAL PT USE INHALER: CPT

## 2024-08-08 PROCEDURE — 6360000002 HC RX W HCPCS: Performed by: INTERNAL MEDICINE

## 2024-08-08 PROCEDURE — 2580000003 HC RX 258: Performed by: EMERGENCY MEDICINE

## 2024-08-08 PROCEDURE — 74176 CT ABD & PELVIS W/O CONTRAST: CPT

## 2024-08-08 PROCEDURE — 99285 EMERGENCY DEPT VISIT HI MDM: CPT

## 2024-08-08 RX ORDER — ONDANSETRON 4 MG/1
4 TABLET, ORALLY DISINTEGRATING ORAL EVERY 8 HOURS PRN
Status: DISCONTINUED | OUTPATIENT
Start: 2024-08-08 | End: 2024-08-11 | Stop reason: HOSPADM

## 2024-08-08 RX ORDER — SODIUM CHLORIDE 0.9 % (FLUSH) 0.9 %
10 SYRINGE (ML) INJECTION PRN
Status: DISCONTINUED | OUTPATIENT
Start: 2024-08-08 | End: 2024-08-11 | Stop reason: HOSPADM

## 2024-08-08 RX ORDER — LORAZEPAM 1 MG/1
1 TABLET ORAL 2 TIMES DAILY
COMMUNITY
Start: 2024-07-02

## 2024-08-08 RX ORDER — BUDESONIDE AND FORMOTEROL FUMARATE DIHYDRATE 160; 4.5 UG/1; UG/1
2 AEROSOL RESPIRATORY (INHALATION)
Status: DISCONTINUED | OUTPATIENT
Start: 2024-08-08 | End: 2024-08-11 | Stop reason: HOSPADM

## 2024-08-08 RX ORDER — PANTOPRAZOLE SODIUM 40 MG/1
40 TABLET, DELAYED RELEASE ORAL DAILY
Status: DISCONTINUED | OUTPATIENT
Start: 2024-08-08 | End: 2024-08-11 | Stop reason: HOSPADM

## 2024-08-08 RX ORDER — MAGNESIUM SULFATE IN WATER 40 MG/ML
2000 INJECTION, SOLUTION INTRAVENOUS PRN
Status: DISCONTINUED | OUTPATIENT
Start: 2024-08-08 | End: 2024-08-11 | Stop reason: HOSPADM

## 2024-08-08 RX ORDER — METOPROLOL TARTRATE 50 MG/1
50 TABLET, FILM COATED ORAL DAILY
Status: DISCONTINUED | OUTPATIENT
Start: 2024-08-08 | End: 2024-08-11 | Stop reason: HOSPADM

## 2024-08-08 RX ORDER — POTASSIUM CHLORIDE 20 MEQ/1
40 TABLET, EXTENDED RELEASE ORAL PRN
Status: DISCONTINUED | OUTPATIENT
Start: 2024-08-08 | End: 2024-08-11 | Stop reason: HOSPADM

## 2024-08-08 RX ORDER — ONDANSETRON 2 MG/ML
4 INJECTION INTRAMUSCULAR; INTRAVENOUS EVERY 6 HOURS PRN
Status: DISCONTINUED | OUTPATIENT
Start: 2024-08-08 | End: 2024-08-11 | Stop reason: HOSPADM

## 2024-08-08 RX ORDER — SODIUM CHLORIDE 9 MG/ML
INJECTION, SOLUTION INTRAVENOUS CONTINUOUS
Status: DISCONTINUED | OUTPATIENT
Start: 2024-08-08 | End: 2024-08-11 | Stop reason: HOSPADM

## 2024-08-08 RX ORDER — MORPHINE SULFATE 2 MG/ML
2 INJECTION, SOLUTION INTRAMUSCULAR; INTRAVENOUS ONCE
Status: COMPLETED | OUTPATIENT
Start: 2024-08-08 | End: 2024-08-08

## 2024-08-08 RX ORDER — ACETAMINOPHEN 650 MG/1
650 SUPPOSITORY RECTAL EVERY 6 HOURS PRN
Status: DISCONTINUED | OUTPATIENT
Start: 2024-08-08 | End: 2024-08-11 | Stop reason: HOSPADM

## 2024-08-08 RX ORDER — POLYETHYLENE GLYCOL 3350 17 G/17G
17 POWDER, FOR SOLUTION ORAL DAILY PRN
Status: DISCONTINUED | OUTPATIENT
Start: 2024-08-08 | End: 2024-08-11 | Stop reason: HOSPADM

## 2024-08-08 RX ORDER — ENOXAPARIN SODIUM 100 MG/ML
30 INJECTION SUBCUTANEOUS 2 TIMES DAILY
Status: DISCONTINUED | OUTPATIENT
Start: 2024-08-08 | End: 2024-08-11 | Stop reason: HOSPADM

## 2024-08-08 RX ORDER — SODIUM CHLORIDE 9 MG/ML
INJECTION, SOLUTION INTRAVENOUS PRN
Status: DISCONTINUED | OUTPATIENT
Start: 2024-08-08 | End: 2024-08-11 | Stop reason: HOSPADM

## 2024-08-08 RX ORDER — 0.9 % SODIUM CHLORIDE 0.9 %
1000 INTRAVENOUS SOLUTION INTRAVENOUS ONCE
Status: COMPLETED | OUTPATIENT
Start: 2024-08-08 | End: 2024-08-08

## 2024-08-08 RX ORDER — LORAZEPAM 1 MG/1
1 TABLET ORAL 2 TIMES DAILY PRN
Status: DISCONTINUED | OUTPATIENT
Start: 2024-08-08 | End: 2024-08-11 | Stop reason: HOSPADM

## 2024-08-08 RX ORDER — CEFADROXIL 500 MG/1
500 CAPSULE ORAL 2 TIMES DAILY
Status: ON HOLD | COMMUNITY
Start: 2024-08-07 | End: 2024-08-11 | Stop reason: HOSPADM

## 2024-08-08 RX ORDER — SERTRALINE HYDROCHLORIDE 100 MG/1
200 TABLET, FILM COATED ORAL DAILY
Status: DISCONTINUED | OUTPATIENT
Start: 2024-08-08 | End: 2024-08-11 | Stop reason: HOSPADM

## 2024-08-08 RX ORDER — SODIUM CHLORIDE 0.9 % (FLUSH) 0.9 %
5-40 SYRINGE (ML) INJECTION EVERY 12 HOURS SCHEDULED
Status: DISCONTINUED | OUTPATIENT
Start: 2024-08-08 | End: 2024-08-11 | Stop reason: HOSPADM

## 2024-08-08 RX ORDER — ACETAMINOPHEN 325 MG/1
650 TABLET ORAL EVERY 6 HOURS PRN
Status: DISCONTINUED | OUTPATIENT
Start: 2024-08-08 | End: 2024-08-11 | Stop reason: HOSPADM

## 2024-08-08 RX ORDER — ONDANSETRON 2 MG/ML
4 INJECTION INTRAMUSCULAR; INTRAVENOUS ONCE
Status: DISCONTINUED | OUTPATIENT
Start: 2024-08-08 | End: 2024-08-11 | Stop reason: HOSPADM

## 2024-08-08 RX ORDER — ATORVASTATIN CALCIUM 40 MG/1
40 TABLET, FILM COATED ORAL NIGHTLY
Status: DISCONTINUED | OUTPATIENT
Start: 2024-08-08 | End: 2024-08-11 | Stop reason: HOSPADM

## 2024-08-08 RX ORDER — SENNOSIDES A AND B 8.6 MG/1
1 TABLET, FILM COATED ORAL EVERY OTHER DAY
Status: DISCONTINUED | OUTPATIENT
Start: 2024-08-08 | End: 2024-08-11 | Stop reason: HOSPADM

## 2024-08-08 RX ORDER — ALBUTEROL SULFATE 90 UG/1
2 AEROSOL, METERED RESPIRATORY (INHALATION) EVERY 6 HOURS PRN
Status: DISCONTINUED | OUTPATIENT
Start: 2024-08-08 | End: 2024-08-11 | Stop reason: HOSPADM

## 2024-08-08 RX ORDER — DEXAMETHASONE SODIUM PHOSPHATE 10 MG/ML
6 INJECTION INTRAMUSCULAR; INTRAVENOUS ONCE
Status: COMPLETED | OUTPATIENT
Start: 2024-08-08 | End: 2024-08-08

## 2024-08-08 RX ORDER — KETOROLAC TROMETHAMINE 30 MG/ML
30 INJECTION, SOLUTION INTRAMUSCULAR; INTRAVENOUS ONCE
Status: COMPLETED | OUTPATIENT
Start: 2024-08-08 | End: 2024-08-08

## 2024-08-08 RX ORDER — POTASSIUM CHLORIDE 7.45 MG/ML
10 INJECTION INTRAVENOUS PRN
Status: DISCONTINUED | OUTPATIENT
Start: 2024-08-08 | End: 2024-08-11 | Stop reason: HOSPADM

## 2024-08-08 RX ORDER — FOLIC ACID 1 MG/1
1 TABLET ORAL DAILY
Status: DISCONTINUED | OUTPATIENT
Start: 2024-08-08 | End: 2024-08-11 | Stop reason: HOSPADM

## 2024-08-08 RX ORDER — LORAZEPAM 1 MG/1
1 TABLET ORAL 2 TIMES DAILY
Status: DISCONTINUED | OUTPATIENT
Start: 2024-08-09 | End: 2024-08-08

## 2024-08-08 RX ORDER — ONDANSETRON 2 MG/ML
4 INJECTION INTRAMUSCULAR; INTRAVENOUS ONCE
Status: COMPLETED | OUTPATIENT
Start: 2024-08-08 | End: 2024-08-08

## 2024-08-08 RX ORDER — ASPIRIN 81 MG/1
81 TABLET ORAL DAILY
Status: DISCONTINUED | OUTPATIENT
Start: 2024-08-08 | End: 2024-08-11 | Stop reason: HOSPADM

## 2024-08-08 RX ORDER — OXYCODONE AND ACETAMINOPHEN 7.5; 325 MG/1; MG/1
1 TABLET ORAL EVERY 6 HOURS PRN
Status: DISCONTINUED | OUTPATIENT
Start: 2024-08-08 | End: 2024-08-09

## 2024-08-08 RX ORDER — ROPINIROLE 1 MG/1
4 TABLET, FILM COATED ORAL NIGHTLY
Status: DISCONTINUED | OUTPATIENT
Start: 2024-08-08 | End: 2024-08-11 | Stop reason: HOSPADM

## 2024-08-08 RX ADMIN — SODIUM CHLORIDE 1000 ML: 9 INJECTION, SOLUTION INTRAVENOUS at 18:04

## 2024-08-08 RX ADMIN — MORPHINE SULFATE 2 MG: 2 INJECTION, SOLUTION INTRAMUSCULAR; INTRAVENOUS at 19:11

## 2024-08-08 RX ADMIN — METOPROLOL TARTRATE 50 MG: 50 TABLET, FILM COATED ORAL at 20:43

## 2024-08-08 RX ADMIN — ONDANSETRON 4 MG: 2 INJECTION INTRAMUSCULAR; INTRAVENOUS at 15:16

## 2024-08-08 RX ADMIN — SERTRALINE 200 MG: 100 TABLET, FILM COATED ORAL at 20:43

## 2024-08-08 RX ADMIN — SODIUM CHLORIDE: 9 INJECTION, SOLUTION INTRAVENOUS at 20:34

## 2024-08-08 RX ADMIN — KETOROLAC TROMETHAMINE 30 MG: 30 INJECTION, SOLUTION INTRAMUSCULAR at 15:04

## 2024-08-08 RX ADMIN — ENOXAPARIN SODIUM 30 MG: 100 INJECTION SUBCUTANEOUS at 20:43

## 2024-08-08 RX ADMIN — PANTOPRAZOLE SODIUM 40 MG: 40 TABLET, DELAYED RELEASE ORAL at 20:43

## 2024-08-08 RX ADMIN — ATORVASTATIN CALCIUM 40 MG: 40 TABLET, FILM COATED ORAL at 20:43

## 2024-08-08 RX ADMIN — SENNOSIDES 8.6 MG: 8.6 TABLET, FILM COATED ORAL at 20:43

## 2024-08-08 RX ADMIN — CEFTRIAXONE SODIUM 1000 MG: 1 INJECTION, POWDER, FOR SOLUTION INTRAMUSCULAR; INTRAVENOUS at 19:14

## 2024-08-08 RX ADMIN — ROPINIROLE HYDROCHLORIDE 4 MG: 1 TABLET, FILM COATED ORAL at 20:43

## 2024-08-08 RX ADMIN — SODIUM CHLORIDE 1000 ML: 9 INJECTION, SOLUTION INTRAVENOUS at 15:16

## 2024-08-08 RX ADMIN — FOLIC ACID 1 MG: 1 TABLET ORAL at 20:43

## 2024-08-08 RX ADMIN — BUDESONIDE AND FORMOTEROL FUMARATE DIHYDRATE 2 PUFF: 160; 4.5 AEROSOL RESPIRATORY (INHALATION) at 20:50

## 2024-08-08 RX ADMIN — LORAZEPAM 1 MG: 1 TABLET ORAL at 23:48

## 2024-08-08 RX ADMIN — DEXAMETHASONE SODIUM PHOSPHATE 6 MG: 10 INJECTION INTRAMUSCULAR; INTRAVENOUS at 01:23

## 2024-08-08 RX ADMIN — ALUMINUM HYDROXIDE, MAGNESIUM HYDROXIDE, AND SIMETHICONE: 1200; 120; 1200 SUSPENSION ORAL at 01:03

## 2024-08-08 RX ADMIN — OXYCODONE HYDROCHLORIDE AND ACETAMINOPHEN 1 TABLET: 7.5; 325 TABLET ORAL at 20:43

## 2024-08-08 RX ADMIN — ASPIRIN 81 MG: 81 TABLET, COATED ORAL at 20:43

## 2024-08-08 RX ADMIN — MORPHINE SULFATE 2 MG: 2 INJECTION, SOLUTION INTRAMUSCULAR; INTRAVENOUS at 18:06

## 2024-08-08 ASSESSMENT — PAIN SCALES - GENERAL
PAINLEVEL_OUTOF10: 8
PAINLEVEL_OUTOF10: 10
PAINLEVEL_OUTOF10: 7
PAINLEVEL_OUTOF10: 6
PAINLEVEL_OUTOF10: 10

## 2024-08-08 ASSESSMENT — PAIN DESCRIPTION - ONSET: ONSET: ON-GOING

## 2024-08-08 ASSESSMENT — PAIN DESCRIPTION - LOCATION
LOCATION: THROAT
LOCATION: FLANK

## 2024-08-08 ASSESSMENT — PAIN DESCRIPTION - ORIENTATION
ORIENTATION: RIGHT

## 2024-08-08 ASSESSMENT — PAIN - FUNCTIONAL ASSESSMENT: PAIN_FUNCTIONAL_ASSESSMENT: 0-10

## 2024-08-08 ASSESSMENT — PAIN DESCRIPTION - DESCRIPTORS
DESCRIPTORS: CRAMPING
DESCRIPTORS: BURNING

## 2024-08-08 ASSESSMENT — PAIN DESCRIPTION - FREQUENCY: FREQUENCY: CONTINUOUS

## 2024-08-08 ASSESSMENT — PAIN DESCRIPTION - PAIN TYPE
TYPE: ACUTE PAIN
TYPE: ACUTE PAIN

## 2024-08-08 ASSESSMENT — PAIN DESCRIPTION - DIRECTION: RADIATING_TOWARDS: BACK

## 2024-08-08 NOTE — ED PROVIDER NOTES
[facility] ED  EMERGENCY DEPARTMENT ENCOUNTER      Pt Name: Tanisha Coleman  MRN: [id number]  Birthdate 1965  Date of evaluation: 8/8/2024  Provider: Amaya Randall MD    CHIEF COMPLAINT       Chief Complaint   Patient presents with    Flank Pain     Patient complains of right lower side pain, prior to arrival. Patient had urinary stent placed 2 days PTA  and was pulled out. Also experiencing nausea          HISTORY OF PRESENT ILLNESS   (Location/Symptom, Timing/Onset, Context/Setting, Quality, Duration, Modifying Factors, Severity)  Note limiting factors.   Tanisha Coleman is a 58 y.o. female who presents to the emergency department      Patient-year-old female present emergency department for evaluation of right flank and right lower quadrant pain.  Patient recently undergone her ureteral stent placement and removed it today just prior to arrival.  She reports she had been given a prescription for antibiotics but she had not filled it.  She did develop some nausea after the removal of the stent.  She has not had any fevers or chills.  She is having discomfort with urination.  She has not taken anything at home for relief.  No other acute concerns        Nursing Notes were reviewed.    REVIEW OF SYSTEMS    (2-9 systems for level 4, 10 or more for level 5)     Review of Systems   All other systems reviewed and are negative.      Except as noted above the remainder of the review of systems was reviewed and negative.       PAST MEDICAL HISTORY     Past Medical History:   Diagnosis Date    Anxiety     Arthritis     Asthma     Bipolar disorder (HCC)     Chronic back pain     COPD (chronic obstructive pulmonary disease) ([facility])     Depression     Eczema of face     Fibromyalgia     Genital warts     GERD (gastroesophageal reflux disease)     Hypertension     Neuropathy     Obesity     Osteoarthritis     Psychiatric problem     Restless legs     Sleep apnea     Stroke ([facility])          SURGICAL HISTORY       Past

## 2024-08-08 NOTE — ED PROVIDER NOTES
[facility]  EMERGENCY DEPARTMENT ENCOUNTER      Pt Name: Tanisha Coleman  MRN: [id number]  Birthdate 1965  Date of evaluation: 8/8/2024  Provider: Ranjan Johnston MD    CHIEF COMPLAINT       Chief Complaint   Patient presents with    Pharyngitis     Patient had surgery at [facility] on 08/07/2024 for kidney stones and was intubated, tonight with complaint of her throat feeling like it is on fire. Started taking phenazopyridine an thinks she is having a reaction to it         HISTORY OF PRESENT ILLNESS      Tanisha Coleman is a 58 y.o. female who presents to the emergency department for burning sensation in her throat.  States this started about 2 to 3 hours prior to arrival.  She had a stent placed in her right ureter at [facility] in [place] yesterday and was intubated for the procedure.  States that she tolerated this well and initially had no complaints of discomfort.  However, developed pain later in the evening in the throat.  States that it feels as if her throat is \"on fire.\"  Has difficulty swallowing secondary to pain but no swelling.    States that she also has some shortness of breath which she relates to this.    No chest pain.  No fever.  No nausea or vomiting.  Tolerating the stent without issue.  No acute urinary complaints.    States that she is concerned that the burning sensation may be an allergic reaction to phenazopyridine.        PAST MEDICAL HISTORY     Past Medical History:   Diagnosis Date    Anxiety     Arthritis     Asthma     Bipolar disorder ([facility])     Chronic back pain     COPD (chronic obstructive pulmonary disease) ([facility])     Depression     Eczema of face     Fibromyalgia     Genital warts     GERD (gastroesophageal reflux disease)     Hypertension     Neuropathy     Obesity     Osteoarthritis     Psychiatric problem     Restless legs     Sleep apnea     Stroke ([facility])          SURGICAL HISTORY       Past Surgical History:   Procedure Laterality Date    APPENDECTOMY      BACK

## 2024-08-08 NOTE — DISCHARGE INSTRUCTIONS
I suspect that your sore throat is related to your recent intubation for surgery.  The steroids given today should provide some relief of discomfort, though they do take a few hours for effects to begin.  Return to the ED for worsening pain, fever, difficulty breathing or other concerns.

## 2024-08-08 NOTE — TELEPHONE ENCOUNTER
[facility] Transitions Initial Follow Up Call    Outreach made within 2 business days of discharge: Yes    Patient: Tanisha Coleman Patient : 1965   MRN: [id number]  Reason for Admission: There are no discharge diagnoses documented for the most recent discharge.  Discharge Date: 24       Spoke with: sana for patient     Discharge department/facility: [facility]     TCM Interactive Patient Contact:  Was patient able to fill all prescriptions:   Was patient instructed to bring all medications to the follow-up visit: Is patient taking all medications as directed in the discharge summary?   Does patient understand their discharge instructions:   Does patient have questions or concerns that need addressed prior to 7-14 day follow up office visit:     Scheduled appointment with PCP within 7-14 days    Follow Up  No future appointments.    Linda Johnston MA

## 2024-08-09 ENCOUNTER — TELEPHONE (OUTPATIENT)
Dept: PRIMARY CARE CLINIC | Age: 59
End: 2024-08-09

## 2024-08-09 LAB
ALBUMIN SERPL-MCNC: 3.4 G/DL (ref 3.5–5.2)
ALBUMIN/GLOB SERPL: 1.3 {RATIO} (ref 1–2.5)
ALP SERPL-CCNC: 62 U/L (ref 35–104)
ALT SERPL-CCNC: 10 U/L (ref 5–33)
ANION GAP SERPL CALCULATED.3IONS-SCNC: 10 MMOL/L (ref 9–17)
AST SERPL-CCNC: 16 U/L
BASOPHILS # BLD: 0.09 K/UL (ref 0–0.2)
BASOPHILS NFR BLD: 1 % (ref 0–2)
BILIRUB SERPL-MCNC: 0.2 MG/DL (ref 0.3–1.2)
BUN SERPL-MCNC: 15 MG/DL (ref 6–20)
BUN/CREAT SERPL: 21 (ref 9–20)
CALCIUM SERPL-MCNC: 8.1 MG/DL (ref 8.6–10.4)
CHLORIDE SERPL-SCNC: 102 MMOL/L (ref 98–107)
CO2 SERPL-SCNC: 28 MMOL/L (ref 20–31)
CREAT SERPL-MCNC: 0.7 MG/DL (ref 0.5–0.9)
EOSINOPHIL # BLD: 0.13 K/UL (ref 0–0.44)
EOSINOPHILS RELATIVE PERCENT: 1 % (ref 1–4)
ERYTHROCYTE [DISTWIDTH] IN BLOOD BY AUTOMATED COUNT: 12.8 % (ref 11.8–14.4)
GFR, ESTIMATED: >90 ML/MIN/1.73M2
GLUCOSE SERPL-MCNC: 101 MG/DL (ref 70–99)
HCT VFR BLD AUTO: 38 % (ref 36.3–47.1)
HGB BLD-MCNC: 12.2 G/DL (ref 11.9–15.1)
IMM GRANULOCYTES # BLD AUTO: 0.09 K/UL (ref 0–0.3)
IMM GRANULOCYTES NFR BLD: 1 %
LYMPHOCYTES NFR BLD: 3.53 K/UL (ref 1.1–3.7)
LYMPHOCYTES RELATIVE PERCENT: 30 % (ref 24–43)
MCH RBC QN AUTO: 31 PG (ref 25.2–33.5)
MCHC RBC AUTO-ENTMCNC: 32.1 G/DL (ref 28.4–34.8)
MCV RBC AUTO: 96.4 FL (ref 82.6–102.9)
MONOCYTES NFR BLD: 0.82 K/UL (ref 0.1–1.2)
MONOCYTES NFR BLD: 7 % (ref 3–12)
NEUTROPHILS NFR BLD: 61 % (ref 36–65)
NEUTS SEG NFR BLD: 7.31 K/UL (ref 1.5–8.1)
NRBC BLD-RTO: 0 PER 100 WBC
PLATELET # BLD AUTO: 214 K/UL (ref 138–453)
PMV BLD AUTO: 10.4 FL (ref 8.1–13.5)
POTASSIUM SERPL-SCNC: 4 MMOL/L (ref 3.7–5.3)
PROT SERPL-MCNC: 6 G/DL (ref 6.4–8.3)
RBC # BLD AUTO: 3.94 M/UL (ref 3.95–5.11)
SODIUM SERPL-SCNC: 140 MMOL/L (ref 135–144)
WBC OTHER # BLD: 12 K/UL (ref 3.5–11.3)

## 2024-08-09 PROCEDURE — 94640 AIRWAY INHALATION TREATMENT: CPT

## 2024-08-09 PROCEDURE — 6370000000 HC RX 637 (ALT 250 FOR IP): Performed by: STUDENT IN AN ORGANIZED HEALTH CARE EDUCATION/TRAINING PROGRAM

## 2024-08-09 PROCEDURE — 94664 DEMO&/EVAL PT USE INHALER: CPT

## 2024-08-09 PROCEDURE — 6370000000 HC RX 637 (ALT 250 FOR IP): Performed by: INTERNAL MEDICINE

## 2024-08-09 PROCEDURE — 2580000003 HC RX 258: Performed by: INTERNAL MEDICINE

## 2024-08-09 PROCEDURE — 6370000000 HC RX 637 (ALT 250 FOR IP)

## 2024-08-09 PROCEDURE — 36415 COLL VENOUS BLD VENIPUNCTURE: CPT

## 2024-08-09 PROCEDURE — 1200000000 HC SEMI PRIVATE

## 2024-08-09 PROCEDURE — 6360000002 HC RX W HCPCS: Performed by: INTERNAL MEDICINE

## 2024-08-09 PROCEDURE — 6360000002 HC RX W HCPCS: Performed by: STUDENT IN AN ORGANIZED HEALTH CARE EDUCATION/TRAINING PROGRAM

## 2024-08-09 PROCEDURE — 6370000000 HC RX 637 (ALT 250 FOR IP): Performed by: NURSE PRACTITIONER

## 2024-08-09 PROCEDURE — 85025 COMPLETE CBC W/AUTO DIFF WBC: CPT

## 2024-08-09 PROCEDURE — 6360000002 HC RX W HCPCS

## 2024-08-09 PROCEDURE — 94761 N-INVAS EAR/PLS OXIMETRY MLT: CPT

## 2024-08-09 PROCEDURE — 80053 COMPREHEN METABOLIC PANEL: CPT

## 2024-08-09 RX ORDER — OXCARBAZEPINE 300 MG/1
300 TABLET, FILM COATED ORAL 2 TIMES DAILY
COMMUNITY

## 2024-08-09 RX ORDER — OXCARBAZEPINE 300 MG/1
300 TABLET, FILM COATED ORAL 2 TIMES DAILY
Status: DISCONTINUED | OUTPATIENT
Start: 2024-08-09 | End: 2024-08-11 | Stop reason: HOSPADM

## 2024-08-09 RX ORDER — KETOROLAC TROMETHAMINE 30 MG/ML
30 INJECTION, SOLUTION INTRAMUSCULAR; INTRAVENOUS EVERY 6 HOURS PRN
Status: DISCONTINUED | OUTPATIENT
Start: 2024-08-09 | End: 2024-08-11 | Stop reason: HOSPADM

## 2024-08-09 RX ORDER — OXYCODONE AND ACETAMINOPHEN 10; 325 MG/1; MG/1
1 TABLET ORAL EVERY 6 HOURS PRN
COMMUNITY

## 2024-08-09 RX ORDER — OXYCODONE AND ACETAMINOPHEN 10; 325 MG/1; MG/1
1 TABLET ORAL EVERY 4 HOURS PRN
Status: DISCONTINUED | OUTPATIENT
Start: 2024-08-09 | End: 2024-08-11 | Stop reason: HOSPADM

## 2024-08-09 RX ORDER — OXYCODONE AND ACETAMINOPHEN 10; 325 MG/1; MG/1
1 TABLET ORAL EVERY 6 HOURS PRN
Status: DISCONTINUED | OUTPATIENT
Start: 2024-08-09 | End: 2024-08-09

## 2024-08-09 RX ORDER — KETOROLAC TROMETHAMINE 30 MG/ML
30 INJECTION, SOLUTION INTRAMUSCULAR; INTRAVENOUS ONCE
Status: COMPLETED | OUTPATIENT
Start: 2024-08-09 | End: 2024-08-09

## 2024-08-09 RX ADMIN — METOPROLOL TARTRATE 50 MG: 50 TABLET, FILM COATED ORAL at 07:22

## 2024-08-09 RX ADMIN — ENOXAPARIN SODIUM 30 MG: 100 INJECTION SUBCUTANEOUS at 21:07

## 2024-08-09 RX ADMIN — OXCARBAZEPINE 300 MG: 300 TABLET, FILM COATED ORAL at 21:08

## 2024-08-09 RX ADMIN — OXYCODONE AND ACETAMINOPHEN 1 TABLET: 10; 325 TABLET ORAL at 10:11

## 2024-08-09 RX ADMIN — SODIUM CHLORIDE: 9 INJECTION, SOLUTION INTRAVENOUS at 18:42

## 2024-08-09 RX ADMIN — SODIUM CHLORIDE: 9 INJECTION, SOLUTION INTRAVENOUS at 07:26

## 2024-08-09 RX ADMIN — BUDESONIDE AND FORMOTEROL FUMARATE DIHYDRATE 2 PUFF: 160; 4.5 AEROSOL RESPIRATORY (INHALATION) at 07:15

## 2024-08-09 RX ADMIN — KETOROLAC TROMETHAMINE 30 MG: 30 INJECTION, SOLUTION INTRAMUSCULAR at 20:22

## 2024-08-09 RX ADMIN — ONDANSETRON 4 MG: 2 INJECTION INTRAMUSCULAR; INTRAVENOUS at 23:07

## 2024-08-09 RX ADMIN — BUDESONIDE AND FORMOTEROL FUMARATE DIHYDRATE 2 PUFF: 160; 4.5 AEROSOL RESPIRATORY (INHALATION) at 19:53

## 2024-08-09 RX ADMIN — ROPINIROLE HYDROCHLORIDE 4 MG: 1 TABLET, FILM COATED ORAL at 21:08

## 2024-08-09 RX ADMIN — CEFTRIAXONE SODIUM 1000 MG: 1 INJECTION, POWDER, FOR SOLUTION INTRAMUSCULAR; INTRAVENOUS at 19:19

## 2024-08-09 RX ADMIN — SERTRALINE 200 MG: 100 TABLET, FILM COATED ORAL at 07:23

## 2024-08-09 RX ADMIN — LORAZEPAM 1 MG: 1 TABLET ORAL at 21:14

## 2024-08-09 RX ADMIN — PANTOPRAZOLE SODIUM 40 MG: 40 TABLET, DELAYED RELEASE ORAL at 07:23

## 2024-08-09 RX ADMIN — FOLIC ACID 1 MG: 1 TABLET ORAL at 07:22

## 2024-08-09 RX ADMIN — KETOROLAC TROMETHAMINE 30 MG: 30 INJECTION, SOLUTION INTRAMUSCULAR at 01:52

## 2024-08-09 RX ADMIN — ATORVASTATIN CALCIUM 40 MG: 40 TABLET, FILM COATED ORAL at 21:08

## 2024-08-09 RX ADMIN — OXYCODONE AND ACETAMINOPHEN 1 TABLET: 10; 325 TABLET ORAL at 16:25

## 2024-08-09 RX ADMIN — OXYCODONE AND ACETAMINOPHEN 1 TABLET: 10; 325 TABLET ORAL at 04:43

## 2024-08-09 RX ADMIN — LORAZEPAM 1 MG: 1 TABLET ORAL at 09:50

## 2024-08-09 ASSESSMENT — PAIN DESCRIPTION - LOCATION
LOCATION: FLANK

## 2024-08-09 ASSESSMENT — PAIN DESCRIPTION - ORIENTATION
ORIENTATION: RIGHT

## 2024-08-09 ASSESSMENT — PAIN DESCRIPTION - FREQUENCY
FREQUENCY: CONTINUOUS
FREQUENCY: INTERMITTENT

## 2024-08-09 ASSESSMENT — PAIN DESCRIPTION - DESCRIPTORS
DESCRIPTORS: CRAMPING
DESCRIPTORS: ACHING
DESCRIPTORS: CRAMPING

## 2024-08-09 ASSESSMENT — PAIN DESCRIPTION - PAIN TYPE
TYPE: ACUTE PAIN

## 2024-08-09 ASSESSMENT — PAIN SCALES - GENERAL
PAINLEVEL_OUTOF10: 7
PAINLEVEL_OUTOF10: 6
PAINLEVEL_OUTOF10: 3
PAINLEVEL_OUTOF10: 7
PAINLEVEL_OUTOF10: 9
PAINLEVEL_OUTOF10: 5
PAINLEVEL_OUTOF10: 9

## 2024-08-09 ASSESSMENT — PAIN DESCRIPTION - DIRECTION
RADIATING_TOWARDS: BAC
RADIATING_TOWARDS: BACK

## 2024-08-09 ASSESSMENT — PAIN DESCRIPTION - ONSET
ONSET: ON-GOING

## 2024-08-09 ASSESSMENT — PAIN - FUNCTIONAL ASSESSMENT: PAIN_FUNCTIONAL_ASSESSMENT: ACTIVITIES ARE NOT PREVENTED

## 2024-08-09 NOTE — RT PROTOCOL NOTE
RESPIRATORY ASSESSMENT PROTOCOL                                                                                              Patient Name: Tanisha Coleman Room#: 0329/0329-01 : 1965     Admitting diagnosis: Acute pyelonephritis [N10]  Flank pain [R10.9]       Medical History:   Past Medical History:   Diagnosis Date    Anxiety     Arthritis     Asthma     Bipolar disorder (HCC)     Chronic back pain     COPD (chronic obstructive pulmonary disease) ([facility])     Depression     Eczema of face     Fibromyalgia     Genital warts     GERD (gastroesophageal reflux disease)     Hypertension     Neuropathy     Obesity     Osteoarthritis     Psychiatric problem     Restless legs     Sleep apnea     Stroke ([facility])        PATIENT ASSESSMENT    LABORATORY DATA  Hematology:   Lab Results   Component Value Date/Time    WBC 18.4 2024 02:16 PM    RBC 5.00 2024 02:16 PM    RBC 4.70 2011 10:06 PM    HGB 15.6 2024 02:16 PM    HGB 13.3 2011 10:06 PM    HCT 46.7 2024 02:16 PM     2024 02:16 PM     Chemistry:  No results found for: \"PHART\", \"LDU9YCM\", \"PO2ART\", \"M9VMTWCM\", \"UXA3SSA\", \"PBEA\", \"NBEA\"    VITALS  Pulse: 60   Respirations: 16  BP: 139/81  SpO2: 98 % O2 Device: None (Room air)  Temp: 97.4 °F (36.3 °C)    SKIN COLOR  [x] Normal  [] Pale  [] Dusky  [] Cyanotic    RESPIRATORY PATTERN  [x] Normal  [] Dyspnea  [] Cheyne-Quezada  [] Kussmaul  [] Biots    AMBULATORY  [x] Yes  [] No  [] With Assistance      Patient Acuity 0 1 2 3 4 Score   Level of Consciousness (LOC) [x]  Alert & Oriented or Pt normal LOC []  Confused;follows directions []  Confused & uncooper-ative []  Obtunded []  Comatose 0   Respiratory Rate  (RR) [x]  Reg. rate & pattern. 12 - 20 bpm  []  Increased RR. Greater than 20 bpm   []  SOB w/ exertion or RR greater than 24 bpm []  Access- ory muscle use at rest. Abn.  resp. []  SOB at rest.   0   Bilateral Breath Sounds (BBS) [x]  Clear []  Diminish-ed bases

## 2024-08-09 NOTE — CARE COORDINATION
08/09/24 1709   IMM Letter   IMM Letter given to Patient/Family/Significant other/Guardian/POA/by: second-patient / DELMY MELISSA   IMM Letter date given: 08/09/24   IMM Letter time given: 1643     SHIN Pizarro

## 2024-08-09 NOTE — CARE COORDINATION
Case Management Assessment  Initial Evaluation    Date/Time of Evaluation: 8/9/2024 5:20 PM  Assessment Completed by: SHIN Pizarro    If patient is discharged prior to next notation, then this note serves as note for discharge by case management.    Patient Name: Tanisha Coleman                   YOB: 1965  Diagnosis: Acute pyelonephritis [N10]  Flank pain [R10.9]                   Date / Time: 8/8/2024  1:59 PM    Patient Admission Status: Inpatient   Readmission Risk (Low < 19, Mod (19-27), High > 27): Readmission Risk Score: 12.1    Current PCP: Maddi Starks APRN - CNP  PCP verified by CM? Yes    Chart Reviewed: Yes      History Provided by: Patient  Patient Orientation: Alert and Oriented, Person, Place, Situation, Self    Patient Cognition: Alert    Hospitalization in the last 30 days (Readmission):  No    If yes, Readmission Assessment in CM Navigator will be completed.    Advance Directives:      Code Status: Full Code   Patient's Primary Decision Maker is: Legal Next of Kin      Discharge Planning:    Patient lives with: Children Type of Home: Apartment  Primary Care Giver: Self  Patient Support Systems include: Children, Family Members   Current Financial resources: Medicaid  Current community resources: None  Current services prior to admission: Durable Medical Equipment            Current DME: Jared Garcias            Type of Home Care services:  None    ADLS  Prior functional level: Independent in ADLs/IADLs  Current functional level: Independent in ADLs/IADLs    PT AM-PAC:   /24  OT AM-PAC:   /24    Family can provide assistance at DC: Yes  Would you like Case Management to discuss the discharge plan with any other family members/significant others, and if so, who? No  Plans to Return to Present Housing: Yes  Other Identified Issues/Barriers to RETURNING to current housing: none  Potential Assistance needed at discharge: Durable Medical Equipment            Potential DME:

## 2024-08-09 NOTE — H&P
History and Physical    Patient:  Tanisha Coleman  MRN: [id number]    Chief Complaint: Flank pain    History Obtained From:  patient, electronic medical record    PCP: Maddi Starks APRN - CNP    History of Present Illness:   The patient is a 58 y.o. female who presents with right-sided flank and abdominal pain.  Patient states she had a lithotripsy and stent placed on Tuesday at [facility] following a kidney stone.  She states when she went to the bathroom yesterday her stent string got caught and she accidentally pulled the stent out.  She states she began having pain and vomiting following stent removal.  She continues to have hematuria and is passing small clots in her urine.  She was given a antibiotic prescription following the procedure however did not get it filled.  She denies any fever, chills, diarrhea or shortness of breath.  She was seen in the ER yesterday for a sore throat following intubation during her surgery.  Past medical history includes bipolar disorder, COPD, asthma, arthritis, hypertension, fibromyalgia, CVA and sleep apnea without CPAP use.  WBC 18.4 with a left shift.  CT of the abdomen pelvis showed right hydronephrosis and hydroureter with perinephritic stranding with no definitive ureteral stone seen.  A urinalysis was collected however is uninterpretable due to the intense color of her urine.      Past Medical History:        Diagnosis Date    Anxiety     Arthritis     Asthma     Bipolar disorder (HCC)     Chronic back pain     COPD (chronic obstructive pulmonary disease) (HCC)     Depression     Eczema of face     Fibromyalgia     Genital warts     GERD (gastroesophageal reflux disease)     Hypertension     Neuropathy     Obesity     Osteoarthritis     Psychiatric problem     Restless legs     Sleep apnea     Stroke ([facility])        Past Surgical History:        Procedure Laterality Date    APPENDECTOMY      BACK SURGERY       SECTION      CHOLECYSTECTOMY      HERNIA

## 2024-08-09 NOTE — TELEPHONE ENCOUNTER
[facility] Transitions Initial Follow Up Call    Outreach made within 2 business days of discharge: Yes    Patient: Tanisha Coleman Patient : 1965   MRN: [id number]  Reason for Admission: There are no discharge diagnoses documented for the most recent discharge.  Discharge Date: 24       Spoke with: patient is currently admitted     Discharge department/facility: [facility] [place]     TCM Interactive Patient Contact:  Was patient able to fill all prescriptions:   Was patient instructed to bring all medications to the follow-up visit:   Is patient taking all medications as directed in the discharge summary?   Does patient understand their discharge instructions:   Does patient have questions or concerns that need addressed prior to 7-14 day follow up office visit:     Scheduled appointment with PCP within 7-14 days    Follow Up  Future Appointments   Date Time Provider Department Center   2024 11:20 AM Maddi Starks, APRN - CNP [facility] E[facility] DEP       Linda Johnston MA

## 2024-08-10 LAB
ALBUMIN SERPL-MCNC: 3.1 G/DL (ref 3.5–5.2)
ALBUMIN/GLOB SERPL: 1.1 {RATIO} (ref 1–2.5)
ALP SERPL-CCNC: 60 U/L (ref 35–104)
ALT SERPL-CCNC: 10 U/L (ref 5–33)
ANION GAP SERPL CALCULATED.3IONS-SCNC: 9 MMOL/L (ref 9–17)
AST SERPL-CCNC: 17 U/L
BASOPHILS # BLD: 0.11 K/UL (ref 0–0.2)
BASOPHILS NFR BLD: 1 % (ref 0–2)
BILIRUB SERPL-MCNC: 0.2 MG/DL (ref 0.3–1.2)
BUN SERPL-MCNC: 14 MG/DL (ref 6–20)
BUN/CREAT SERPL: 23 (ref 9–20)
CALCIUM SERPL-MCNC: 8 MG/DL (ref 8.6–10.4)
CHLORIDE SERPL-SCNC: 103 MMOL/L (ref 98–107)
CO2 SERPL-SCNC: 28 MMOL/L (ref 20–31)
CREAT SERPL-MCNC: 0.6 MG/DL (ref 0.5–0.9)
EOSINOPHIL # BLD: 0.21 K/UL (ref 0–0.44)
EOSINOPHILS RELATIVE PERCENT: 2 % (ref 1–4)
ERYTHROCYTE [DISTWIDTH] IN BLOOD BY AUTOMATED COUNT: 12.7 % (ref 11.8–14.4)
GFR, ESTIMATED: >90 ML/MIN/1.73M2
GLUCOSE SERPL-MCNC: 94 MG/DL (ref 70–99)
HCT VFR BLD AUTO: 36.8 % (ref 36.3–47.1)
HGB BLD-MCNC: 11.9 G/DL (ref 11.9–15.1)
IMM GRANULOCYTES # BLD AUTO: 0.11 K/UL (ref 0–0.3)
IMM GRANULOCYTES NFR BLD: 1 %
LYMPHOCYTES NFR BLD: 2.93 K/UL (ref 1.1–3.7)
LYMPHOCYTES RELATIVE PERCENT: 29 % (ref 24–43)
MCH RBC QN AUTO: 30.9 PG (ref 25.2–33.5)
MCHC RBC AUTO-ENTMCNC: 32.3 G/DL (ref 28.4–34.8)
MCV RBC AUTO: 95.6 FL (ref 82.6–102.9)
MICROORGANISM SPEC CULT: NO GROWTH
MONOCYTES NFR BLD: 0.72 K/UL (ref 0.1–1.2)
MONOCYTES NFR BLD: 7 % (ref 3–12)
NEUTROPHILS NFR BLD: 60 % (ref 36–65)
NEUTS SEG NFR BLD: 6 K/UL (ref 1.5–8.1)
NRBC BLD-RTO: 0 PER 100 WBC
PLATELET # BLD AUTO: 176 K/UL (ref 138–453)
PMV BLD AUTO: 9.9 FL (ref 8.1–13.5)
POTASSIUM SERPL-SCNC: 4 MMOL/L (ref 3.7–5.3)
PROT SERPL-MCNC: 5.8 G/DL (ref 6.4–8.3)
RBC # BLD AUTO: 3.85 M/UL (ref 3.95–5.11)
SERVICE CMNT-IMP: NORMAL
SODIUM SERPL-SCNC: 140 MMOL/L (ref 135–144)
SPECIMEN DESCRIPTION: NORMAL
WBC OTHER # BLD: 10.1 K/UL (ref 3.5–11.3)

## 2024-08-10 PROCEDURE — 6360000002 HC RX W HCPCS: Performed by: STUDENT IN AN ORGANIZED HEALTH CARE EDUCATION/TRAINING PROGRAM

## 2024-08-10 PROCEDURE — 94761 N-INVAS EAR/PLS OXIMETRY MLT: CPT

## 2024-08-10 PROCEDURE — 6370000000 HC RX 637 (ALT 250 FOR IP): Performed by: STUDENT IN AN ORGANIZED HEALTH CARE EDUCATION/TRAINING PROGRAM

## 2024-08-10 PROCEDURE — 1200000000 HC SEMI PRIVATE

## 2024-08-10 PROCEDURE — 6360000002 HC RX W HCPCS: Performed by: INTERNAL MEDICINE

## 2024-08-10 PROCEDURE — 6370000000 HC RX 637 (ALT 250 FOR IP): Performed by: INTERNAL MEDICINE

## 2024-08-10 PROCEDURE — 80053 COMPREHEN METABOLIC PANEL: CPT

## 2024-08-10 PROCEDURE — 6370000000 HC RX 637 (ALT 250 FOR IP)

## 2024-08-10 PROCEDURE — 6370000000 HC RX 637 (ALT 250 FOR IP): Performed by: NURSE PRACTITIONER

## 2024-08-10 PROCEDURE — 36415 COLL VENOUS BLD VENIPUNCTURE: CPT

## 2024-08-10 PROCEDURE — 85025 COMPLETE CBC W/AUTO DIFF WBC: CPT

## 2024-08-10 PROCEDURE — 94640 AIRWAY INHALATION TREATMENT: CPT

## 2024-08-10 PROCEDURE — 2580000003 HC RX 258: Performed by: INTERNAL MEDICINE

## 2024-08-10 RX ADMIN — BUDESONIDE AND FORMOTEROL FUMARATE DIHYDRATE 2 PUFF: 160; 4.5 AEROSOL RESPIRATORY (INHALATION) at 21:35

## 2024-08-10 RX ADMIN — ASPIRIN 81 MG: 81 TABLET, COATED ORAL at 09:09

## 2024-08-10 RX ADMIN — FOLIC ACID 1 MG: 1 TABLET ORAL at 09:09

## 2024-08-10 RX ADMIN — OXYCODONE AND ACETAMINOPHEN 1 TABLET: 10; 325 TABLET ORAL at 19:06

## 2024-08-10 RX ADMIN — OXYCODONE AND ACETAMINOPHEN 1 TABLET: 10; 325 TABLET ORAL at 23:33

## 2024-08-10 RX ADMIN — BUDESONIDE AND FORMOTEROL FUMARATE DIHYDRATE 2 PUFF: 160; 4.5 AEROSOL RESPIRATORY (INHALATION) at 08:39

## 2024-08-10 RX ADMIN — OXYCODONE AND ACETAMINOPHEN 1 TABLET: 10; 325 TABLET ORAL at 11:57

## 2024-08-10 RX ADMIN — ENOXAPARIN SODIUM 30 MG: 100 INJECTION SUBCUTANEOUS at 21:09

## 2024-08-10 RX ADMIN — ATORVASTATIN CALCIUM 40 MG: 40 TABLET, FILM COATED ORAL at 21:09

## 2024-08-10 RX ADMIN — SODIUM CHLORIDE, PRESERVATIVE FREE 10 ML: 5 INJECTION INTRAVENOUS at 09:11

## 2024-08-10 RX ADMIN — SODIUM CHLORIDE: 9 INJECTION, SOLUTION INTRAVENOUS at 17:39

## 2024-08-10 RX ADMIN — SENNOSIDES 8.6 MG: 8.6 TABLET, FILM COATED ORAL at 09:09

## 2024-08-10 RX ADMIN — OXCARBAZEPINE 300 MG: 300 TABLET, FILM COATED ORAL at 21:09

## 2024-08-10 RX ADMIN — CEFTRIAXONE SODIUM 1000 MG: 1 INJECTION, POWDER, FOR SOLUTION INTRAMUSCULAR; INTRAVENOUS at 18:56

## 2024-08-10 RX ADMIN — ENOXAPARIN SODIUM 30 MG: 100 INJECTION SUBCUTANEOUS at 09:11

## 2024-08-10 RX ADMIN — ROPINIROLE HYDROCHLORIDE 4 MG: 1 TABLET, FILM COATED ORAL at 21:09

## 2024-08-10 RX ADMIN — METOPROLOL TARTRATE 50 MG: 50 TABLET, FILM COATED ORAL at 09:09

## 2024-08-10 RX ADMIN — OXYCODONE AND ACETAMINOPHEN 1 TABLET: 10; 325 TABLET ORAL at 06:03

## 2024-08-10 RX ADMIN — OXYCODONE AND ACETAMINOPHEN 1 TABLET: 10; 325 TABLET ORAL at 01:14

## 2024-08-10 RX ADMIN — LORAZEPAM 1 MG: 1 TABLET ORAL at 23:34

## 2024-08-10 RX ADMIN — KETOROLAC TROMETHAMINE 30 MG: 30 INJECTION, SOLUTION INTRAMUSCULAR at 16:52

## 2024-08-10 RX ADMIN — SODIUM CHLORIDE: 9 INJECTION, SOLUTION INTRAVENOUS at 06:16

## 2024-08-10 RX ADMIN — PANTOPRAZOLE SODIUM 40 MG: 40 TABLET, DELAYED RELEASE ORAL at 09:09

## 2024-08-10 RX ADMIN — OXCARBAZEPINE 300 MG: 300 TABLET, FILM COATED ORAL at 09:09

## 2024-08-10 RX ADMIN — SERTRALINE 200 MG: 100 TABLET, FILM COATED ORAL at 09:09

## 2024-08-10 ASSESSMENT — PAIN DESCRIPTION - LOCATION
LOCATION: BACK
LOCATION: FLANK
LOCATION: BACK

## 2024-08-10 ASSESSMENT — PAIN DESCRIPTION - ONSET
ONSET: ON-GOING

## 2024-08-10 ASSESSMENT — PAIN DESCRIPTION - DESCRIPTORS
DESCRIPTORS: ACHING
DESCRIPTORS: CRAMPING
DESCRIPTORS: ACHING
DESCRIPTORS: CRAMPING
DESCRIPTORS: ACHING
DESCRIPTORS: ACHING
DESCRIPTORS: CRAMPING

## 2024-08-10 ASSESSMENT — PAIN - FUNCTIONAL ASSESSMENT
PAIN_FUNCTIONAL_ASSESSMENT: ACTIVITIES ARE NOT PREVENTED

## 2024-08-10 ASSESSMENT — PAIN DESCRIPTION - ORIENTATION
ORIENTATION: RIGHT

## 2024-08-10 ASSESSMENT — PAIN SCALES - GENERAL
PAINLEVEL_OUTOF10: 8
PAINLEVEL_OUTOF10: 9
PAINLEVEL_OUTOF10: 8
PAINLEVEL_OUTOF10: 8
PAINLEVEL_OUTOF10: 5
PAINLEVEL_OUTOF10: 8
PAINLEVEL_OUTOF10: 6

## 2024-08-10 ASSESSMENT — PAIN DESCRIPTION - PAIN TYPE
TYPE: ACUTE PAIN

## 2024-08-10 ASSESSMENT — PAIN DESCRIPTION - FREQUENCY
FREQUENCY: CONTINUOUS

## 2024-08-11 ENCOUNTER — APPOINTMENT (OUTPATIENT)
Dept: CT IMAGING | Age: 59
DRG: 463 | End: 2024-08-11
Payer: COMMERCIAL

## 2024-08-11 VITALS
RESPIRATION RATE: 18 BRPM | SYSTOLIC BLOOD PRESSURE: 101 MMHG | OXYGEN SATURATION: 96 % | TEMPERATURE: 97.3 F | HEIGHT: 64 IN | WEIGHT: 247.5 LBS | DIASTOLIC BLOOD PRESSURE: 54 MMHG | BODY MASS INDEX: 42.25 KG/M2 | HEART RATE: 61 BPM

## 2024-08-11 LAB
ALBUMIN SERPL-MCNC: 3.4 G/DL (ref 3.5–5.2)
ALBUMIN/GLOB SERPL: 1.4 {RATIO} (ref 1–2.5)
ALP SERPL-CCNC: 66 U/L (ref 35–104)
ALT SERPL-CCNC: 16 U/L (ref 5–33)
ANION GAP SERPL CALCULATED.3IONS-SCNC: 8 MMOL/L (ref 9–17)
AST SERPL-CCNC: 24 U/L
BACTERIA URNS QL MICRO: ABNORMAL
BASOPHILS # BLD: 0.1 K/UL (ref 0–0.2)
BASOPHILS NFR BLD: 1 % (ref 0–2)
BILIRUB SERPL-MCNC: <0.1 MG/DL (ref 0.3–1.2)
BILIRUB UR QL STRIP: NEGATIVE
BUN SERPL-MCNC: 10 MG/DL (ref 6–20)
BUN/CREAT SERPL: 17 (ref 9–20)
CALCIUM SERPL-MCNC: 8.3 MG/DL (ref 8.6–10.4)
CHLORIDE SERPL-SCNC: 102 MMOL/L (ref 98–107)
CLARITY UR: CLEAR
CO2 SERPL-SCNC: 29 MMOL/L (ref 20–31)
COLOR UR: YELLOW
CREAT SERPL-MCNC: 0.6 MG/DL (ref 0.5–0.9)
EOSINOPHIL # BLD: 0.32 K/UL (ref 0–0.44)
EOSINOPHILS RELATIVE PERCENT: 3 % (ref 1–4)
EPI CELLS #/AREA URNS HPF: ABNORMAL /HPF (ref 0–25)
ERYTHROCYTE [DISTWIDTH] IN BLOOD BY AUTOMATED COUNT: 12.6 % (ref 11.8–14.4)
GFR, ESTIMATED: >90 ML/MIN/1.73M2
GLUCOSE SERPL-MCNC: 91 MG/DL (ref 70–99)
GLUCOSE UR STRIP-MCNC: NEGATIVE MG/DL
HCT VFR BLD AUTO: 35.1 % (ref 36.3–47.1)
HGB BLD-MCNC: 11.7 G/DL (ref 11.9–15.1)
HGB UR QL STRIP.AUTO: ABNORMAL
IMM GRANULOCYTES # BLD AUTO: 0.11 K/UL (ref 0–0.3)
IMM GRANULOCYTES NFR BLD: 1 %
KETONES UR STRIP-MCNC: NEGATIVE MG/DL
LEUKOCYTE ESTERASE UR QL STRIP: ABNORMAL
LYMPHOCYTES NFR BLD: 2.5 K/UL (ref 1.1–3.7)
LYMPHOCYTES RELATIVE PERCENT: 27 % (ref 24–43)
MCH RBC QN AUTO: 31.4 PG (ref 25.2–33.5)
MCHC RBC AUTO-ENTMCNC: 33.3 G/DL (ref 28.4–34.8)
MCV RBC AUTO: 94.1 FL (ref 82.6–102.9)
MONOCYTES NFR BLD: 0.61 K/UL (ref 0.1–1.2)
MONOCYTES NFR BLD: 7 % (ref 3–12)
NEUTROPHILS NFR BLD: 61 % (ref 36–65)
NEUTS SEG NFR BLD: 5.77 K/UL (ref 1.5–8.1)
NITRITE UR QL STRIP: NEGATIVE
NRBC BLD-RTO: 0 PER 100 WBC
PH UR STRIP: 6 [PH] (ref 5–9)
PLATELET # BLD AUTO: 180 K/UL (ref 138–453)
PMV BLD AUTO: 10.2 FL (ref 8.1–13.5)
POTASSIUM SERPL-SCNC: 3.9 MMOL/L (ref 3.7–5.3)
PROT SERPL-MCNC: 5.9 G/DL (ref 6.4–8.3)
PROT UR STRIP-MCNC: NEGATIVE MG/DL
RBC # BLD AUTO: 3.73 M/UL (ref 3.95–5.11)
RBC #/AREA URNS HPF: ABNORMAL /HPF (ref 0–2)
SODIUM SERPL-SCNC: 139 MMOL/L (ref 135–144)
SP GR UR STRIP: 1.01 (ref 1.01–1.02)
UROBILINOGEN UR STRIP-ACNC: NORMAL EU/DL (ref 0–1)
WBC #/AREA URNS HPF: ABNORMAL /HPF (ref 0–5)
WBC OTHER # BLD: 9.4 K/UL (ref 3.5–11.3)

## 2024-08-11 PROCEDURE — 6360000002 HC RX W HCPCS: Performed by: INTERNAL MEDICINE

## 2024-08-11 PROCEDURE — 2580000003 HC RX 258: Performed by: INTERNAL MEDICINE

## 2024-08-11 PROCEDURE — 85025 COMPLETE CBC W/AUTO DIFF WBC: CPT

## 2024-08-11 PROCEDURE — 6370000000 HC RX 637 (ALT 250 FOR IP): Performed by: NURSE PRACTITIONER

## 2024-08-11 PROCEDURE — 6360000002 HC RX W HCPCS: Performed by: STUDENT IN AN ORGANIZED HEALTH CARE EDUCATION/TRAINING PROGRAM

## 2024-08-11 PROCEDURE — 6370000000 HC RX 637 (ALT 250 FOR IP): Performed by: STUDENT IN AN ORGANIZED HEALTH CARE EDUCATION/TRAINING PROGRAM

## 2024-08-11 PROCEDURE — 80053 COMPREHEN METABOLIC PANEL: CPT

## 2024-08-11 PROCEDURE — 81001 URINALYSIS AUTO W/SCOPE: CPT

## 2024-08-11 PROCEDURE — 6370000000 HC RX 637 (ALT 250 FOR IP): Performed by: INTERNAL MEDICINE

## 2024-08-11 PROCEDURE — 94640 AIRWAY INHALATION TREATMENT: CPT

## 2024-08-11 PROCEDURE — 94761 N-INVAS EAR/PLS OXIMETRY MLT: CPT

## 2024-08-11 PROCEDURE — 36415 COLL VENOUS BLD VENIPUNCTURE: CPT

## 2024-08-11 PROCEDURE — 94664 DEMO&/EVAL PT USE INHALER: CPT

## 2024-08-11 PROCEDURE — 74176 CT ABD & PELVIS W/O CONTRAST: CPT

## 2024-08-11 RX ORDER — KETOROLAC TROMETHAMINE 10 MG/1
10 TABLET, FILM COATED ORAL EVERY 6 HOURS PRN
Qty: 20 TABLET | Refills: 0 | Status: SHIPPED | OUTPATIENT
Start: 2024-08-11 | End: 2025-08-11

## 2024-08-11 RX ORDER — ONDANSETRON 4 MG/1
4 TABLET, ORALLY DISINTEGRATING ORAL EVERY 8 HOURS PRN
Qty: 42 TABLET | Refills: 0 | Status: SHIPPED | OUTPATIENT
Start: 2024-08-11 | End: 2024-08-25

## 2024-08-11 RX ORDER — TAMSULOSIN HYDROCHLORIDE 0.4 MG/1
0.4 CAPSULE ORAL DAILY
Qty: 30 CAPSULE | Refills: 0 | Status: SHIPPED | OUTPATIENT
Start: 2024-08-12

## 2024-08-11 RX ORDER — TAMSULOSIN HYDROCHLORIDE 0.4 MG/1
0.4 CAPSULE ORAL DAILY
Status: DISCONTINUED | OUTPATIENT
Start: 2024-08-11 | End: 2024-08-11 | Stop reason: HOSPADM

## 2024-08-11 RX ORDER — MORPHINE SULFATE 2 MG/ML
2 INJECTION, SOLUTION INTRAMUSCULAR; INTRAVENOUS
Status: DISCONTINUED | OUTPATIENT
Start: 2024-08-11 | End: 2024-08-11 | Stop reason: HOSPADM

## 2024-08-11 RX ORDER — MORPHINE SULFATE 4 MG/ML
4 INJECTION, SOLUTION INTRAMUSCULAR; INTRAVENOUS
Status: DISCONTINUED | OUTPATIENT
Start: 2024-08-11 | End: 2024-08-11 | Stop reason: HOSPADM

## 2024-08-11 RX ADMIN — SODIUM CHLORIDE: 9 INJECTION, SOLUTION INTRAVENOUS at 04:44

## 2024-08-11 RX ADMIN — METOPROLOL TARTRATE 50 MG: 50 TABLET, FILM COATED ORAL at 08:34

## 2024-08-11 RX ADMIN — KETOROLAC TROMETHAMINE 30 MG: 30 INJECTION, SOLUTION INTRAMUSCULAR at 04:26

## 2024-08-11 RX ADMIN — PANTOPRAZOLE SODIUM 40 MG: 40 TABLET, DELAYED RELEASE ORAL at 08:34

## 2024-08-11 RX ADMIN — TAMSULOSIN HYDROCHLORIDE 0.4 MG: 0.4 CAPSULE ORAL at 08:33

## 2024-08-11 RX ADMIN — BUDESONIDE AND FORMOTEROL FUMARATE DIHYDRATE 2 PUFF: 160; 4.5 AEROSOL RESPIRATORY (INHALATION) at 07:37

## 2024-08-11 RX ADMIN — ONDANSETRON 4 MG: 2 INJECTION INTRAMUSCULAR; INTRAVENOUS at 08:32

## 2024-08-11 RX ADMIN — OXYCODONE AND ACETAMINOPHEN 1 TABLET: 10; 325 TABLET ORAL at 03:37

## 2024-08-11 RX ADMIN — SODIUM CHLORIDE: 9 INJECTION, SOLUTION INTRAVENOUS at 15:29

## 2024-08-11 RX ADMIN — FOLIC ACID 1 MG: 1 TABLET ORAL at 08:33

## 2024-08-11 RX ADMIN — ASPIRIN 81 MG: 81 TABLET, COATED ORAL at 08:33

## 2024-08-11 RX ADMIN — OXYCODONE AND ACETAMINOPHEN 1 TABLET: 10; 325 TABLET ORAL at 07:15

## 2024-08-11 RX ADMIN — OXCARBAZEPINE 300 MG: 300 TABLET, FILM COATED ORAL at 08:33

## 2024-08-11 RX ADMIN — ENOXAPARIN SODIUM 30 MG: 100 INJECTION SUBCUTANEOUS at 08:35

## 2024-08-11 RX ADMIN — OXYCODONE AND ACETAMINOPHEN 1 TABLET: 10; 325 TABLET ORAL at 13:43

## 2024-08-11 RX ADMIN — SERTRALINE 200 MG: 100 TABLET, FILM COATED ORAL at 08:33

## 2024-08-11 ASSESSMENT — PAIN DESCRIPTION - PAIN TYPE
TYPE: ACUTE PAIN
TYPE: ACUTE PAIN

## 2024-08-11 ASSESSMENT — PAIN - FUNCTIONAL ASSESSMENT
PAIN_FUNCTIONAL_ASSESSMENT: ACTIVITIES ARE NOT PREVENTED

## 2024-08-11 ASSESSMENT — PAIN DESCRIPTION - ORIENTATION
ORIENTATION: LOWER
ORIENTATION: RIGHT
ORIENTATION: RIGHT
ORIENTATION: LOWER

## 2024-08-11 ASSESSMENT — PAIN DESCRIPTION - ONSET
ONSET: ON-GOING
ONSET: ON-GOING

## 2024-08-11 ASSESSMENT — PAIN DESCRIPTION - LOCATION
LOCATION: BACK;ABDOMEN
LOCATION: BACK
LOCATION: ABDOMEN;BACK

## 2024-08-11 ASSESSMENT — PAIN SCALES - GENERAL
PAINLEVEL_OUTOF10: 7
PAINLEVEL_OUTOF10: 10
PAINLEVEL_OUTOF10: 7
PAINLEVEL_OUTOF10: 8
PAINLEVEL_OUTOF10: 8

## 2024-08-11 ASSESSMENT — PAIN DESCRIPTION - FREQUENCY
FREQUENCY: CONTINUOUS
FREQUENCY: CONTINUOUS

## 2024-08-11 ASSESSMENT — PAIN DESCRIPTION - DESCRIPTORS
DESCRIPTORS: CRAMPING
DESCRIPTORS: ACHING

## 2024-08-11 NOTE — PLAN OF CARE
Problem: Discharge Planning  Goal: Discharge to home or other facility with appropriate resources  8/11/2024 1859 by Evi Conn RN  Outcome: Completed  8/11/2024 1230 by Reanna Saenz RN  Outcome: Progressing  Flowsheets (Taken 8/11/2024 1230)  Discharge to home or other facility with appropriate resources: Identify barriers to discharge with patient and caregiver     Problem: Pain  Goal: Verbalizes/displays adequate comfort level or baseline comfort level  8/11/2024 1859 by Evi Conn RN  Outcome: Completed  8/11/2024 1230 by Reanna Saenz RN  Outcome: Progressing  Flowsheets (Taken 8/11/2024 1230)  Verbalizes/displays adequate comfort level or baseline comfort level:   Assess pain using appropriate pain scale   Encourage patient to monitor pain and request assistance   Administer analgesics based on type and severity of pain and evaluate response   Implement non-pharmacological measures as appropriate and evaluate response     Problem: Genitourinary - Adult  Goal: Absence of urinary retention  8/11/2024 1859 by Evi Conn RN  Outcome: Completed  8/11/2024 1230 by Reanna Saenz RN  Outcome: Progressing  Flowsheets (Taken 8/11/2024 1230)  Absence of urinary retention:   Assess patient’s ability to void and empty bladder   Monitor intake/output and perform bladder scan as needed     Problem: Infection - Adult  Goal: Absence of infection at discharge  8/11/2024 1859 by Evi Conn RN  Outcome: Completed  8/11/2024 1230 by Reanna Saenz RN  Outcome: Progressing  Flowsheets (Taken 8/11/2024 1230)  Absence of infection at discharge:   Assess and monitor for signs and symptoms of infection   Monitor lab/diagnostic results   Administer medications as ordered     Problem: Metabolic/Fluid and Electrolytes - Adult  Goal: Electrolytes maintained within normal limits  8/11/2024 1859 by Evi Conn RN  Outcome: Completed  8/11/2024 1230 by Reanna Saenz RN  Outcome: Progressing  Flowsheets 
  Problem: Discharge Planning  Goal: Discharge to home or other facility with appropriate resources  8/9/2024 0737 by Amy Sheth RN  Outcome: Progressing     Problem: Pain  Goal: Verbalizes/displays adequate comfort level or baseline comfort level  8/9/2024 0737 by Amy Sheth RN  Outcome: Progressing     Problem: Genitourinary - Adult  Goal: Absence of urinary retention  8/9/2024 0737 by Amy Sheth RN  Outcome: Progressing     Problem: Infection - Adult  Goal: Absence of infection at discharge  8/9/2024 0737 by Amy Sheth RN  Outcome: Progressing     Problem: Metabolic/Fluid and Electrolytes - Adult  Goal: Electrolytes maintained within normal limits  8/9/2024 0737 by Amy Sheth RN  Outcome: Progressing     Problem: Metabolic/Fluid and Electrolytes - Adult  Goal: Hemodynamic stability and optimal renal function maintained  8/9/2024 0737 by Amy Sheth RN  Outcome: Progressing     
  Problem: Discharge Planning  Goal: Discharge to home or other facility with appropriate resources  Outcome: Progressing     Problem: Pain  Goal: Verbalizes/displays adequate comfort level or baseline comfort level  Outcome: Progressing     Problem: Genitourinary - Adult  Goal: Absence of urinary retention  Outcome: Progressing     Problem: Infection - Adult  Goal: Absence of infection at discharge  Outcome: Progressing     Problem: Metabolic/Fluid and Electrolytes - Adult  Goal: Electrolytes maintained within normal limits  Outcome: Progressing  Goal: Hemodynamic stability and optimal renal function maintained  Outcome: Progressing     
  Problem: Discharge Planning  Goal: Discharge to home or other facility with appropriate resources  Outcome: Progressing  Flowsheets (Taken 8/10/2024 0423)  Discharge to home or other facility with appropriate resources: Identify barriers to discharge with patient and caregiver     Problem: Pain  Goal: Verbalizes/displays adequate comfort level or baseline comfort level  Outcome: Progressing  Flowsheets (Taken 8/10/2024 0423)  Verbalizes/displays adequate comfort level or baseline comfort level:   Encourage patient to monitor pain and request assistance   Assess pain using appropriate pain scale     Problem: Genitourinary - Adult  Goal: Absence of urinary retention  Outcome: Progressing  Flowsheets (Taken 8/10/2024 0423)  Absence of urinary retention: Monitor intake/output and perform bladder scan as needed     Problem: Infection - Adult  Goal: Absence of infection at discharge  Outcome: Progressing  Flowsheets (Taken 8/10/2024 0423)  Absence of infection at discharge:   Assess and monitor for signs and symptoms of infection   Administer medications as ordered     Problem: Chronic Conditions and Co-morbidities  Goal: Patient's chronic conditions and co-morbidity symptoms are monitored and maintained or improved  Outcome: Progressing  Flowsheets (Taken 8/10/2024 0423)  Care Plan - Patient's Chronic Conditions and Co-Morbidity Symptoms are Monitored and Maintained or Improved: Monitor and assess patient's chronic conditions and comorbid symptoms for stability, deterioration, or improvement     Problem: Metabolic/Fluid and Electrolytes - Adult  Goal: Hemodynamic stability and optimal renal function maintained  Flowsheets (Taken 8/10/2024 0423)  Hemodynamic stability and optimal renal function maintained: Monitor intake, output and patient weight     
0217)  Absence of urinary retention:   Assess patient’s ability to void and empty bladder   Monitor intake/output and perform bladder scan as needed     Problem: Metabolic/Fluid and Electrolytes - Adult  Goal: Hemodynamic stability and optimal renal function maintained  Flowsheets (Taken 8/11/2024 0217)  Hemodynamic stability and optimal renal function maintained:   Monitor labs and assess for signs and symptoms of volume excess or deficit   Monitor intake, output and patient weight     
1230 by Reanna Saenz, RN  Outcome: Progressing  Flowsheets (Taken 8/11/2024 1230)  Hemodynamic stability and optimal renal function maintained:   Monitor labs and assess for signs and symptoms of volume excess or deficit   Monitor intake, output and patient weight     Problem: Chronic Conditions and Co-morbidities  Goal: Patient's chronic conditions and co-morbidity symptoms are monitored and maintained or improved  8/11/2024 1230 by Reanna Saenz, RN  Outcome: Progressing  Flowsheets (Taken 8/11/2024 1230)  Care Plan - Patient's Chronic Conditions and Co-Morbidity Symptoms are Monitored and Maintained or Improved:   Monitor and assess patient's chronic conditions and comorbid symptoms for stability, deterioration, or improvement   Collaborate with multidisciplinary team to address chronic and comorbid conditions and prevent exacerbation or deterioration   Update acute care plan with appropriate goals if chronic or comorbid symptoms are exacerbated and prevent overall improvement and discharge     Problem: Safety - Adult  Goal: Free from fall injury  8/11/2024 1230 by Reanna Saenz, RN  Outcome: Progressing  Flowsheets (Taken 8/11/2024 1230)  Free From Fall Injury: Instruct family/caregiver on patient safety

## 2024-08-11 NOTE — DISCHARGE SUMMARY
[address] , [place], [place], [address]    Discharge Summary      NAME:  Tanisha Coleman  :  1965  MRN:  [id number]    Admit date:  2024  Discharge date:  24      Admitting Physician:  Eddie Cronin MD    Primary Diagnosis on Admission:   Present on Admission:   Acute pyelonephritis   Bipolar 1 disorder (HCC)   Hypertension   Status post cystoscopy with ureteral stent placement      Secondary Diagnoses:  does not have any pertinent problems on file.    Admission Condition:  stable     Discharged Condition: good    Hospital Course:   The patient was admitted for the management of right sided flank pain originally suspected due to Pyelonephritis, s/p self-removed ureteral stent and a Lithotripsy. Urinalysis/Cultures were obtained and she was provided empirically with Antibiotics until the cultures resulted were negative for growth. She was provided with IVF and pain control as indicated. Her pain persisted and a repeat CT scan was obtained at the time. She was also started on Flomax and provided with Toradol which was well tolerated. She does have Percocet at home which had been further adjusted. Today on day of discharge the patient was monitored and she felt better with no further complaints. Vitals and Labs are stable. She will have close OP f/u with her PCP and Urology. Tanisha will have repeat labs and a urinalysis in about 2 days' time. She did have a UA on the day of discharge that was positive for RBC's although she indicates this cleared up. She feels ready and wants to be discharged today vs ongoing monitoring as she feels comfortable/stable.    If there are any worsening or concerning signs or symptoms, patient will report to the ED and/or contact [id number] for immediate evaluation. Teach back method was used. All patient questions answered. Pt voiced understanding.     Consults:  none/D-W Urology.    Significant Diagnostic/theraputic interventions: CT imaging, IV

## 2024-08-11 NOTE — PROGRESS NOTES
[address] , [place], [place], [address]    Progress Note    Date:   8/10/2024  Patient name:  Tanisha Coleman  Date of admission:  2024  1:59 PM  MRN:   [id number]  YOB: 1965    SUBJECTIVE/Last 24 hours update:     Patient seen and examined at the bed side , no new acute events overnight except for some severe right sided flank pain. Medications were adjusted. No new complains noted. Vss, afebrile. Notes from nursing staff and Consults had been reviewed, and the overnight progress had been checked with the nursing staff as well.        REVIEW OF SYSTEMS:      CONSTITUTIONAL:  no fevers, no headcahes  EYES: negative for blury vision  HEENT: No headaches, No nasal congestion, no difficulty swallowing  RESPIRATORY:negative for dyspnea, no wheezing, no Cough  CARDIOVASCULAR: negative for chest pain, no palpitations  GASTROINTESTINAL: no nausea, no vomiting, no change in bowel habits, flank/ abdominal pain   GENITOURINARY: negative for dysuria, no hematuria   MUSCULOSKELETAL: no joint pains, no muscle aches, no swelling of joints or extremities  NEUROLOGICAL: No  Weakness or numbness      PAST MEDICAL HISTORY:      has a past medical history of Anxiety, Arthritis, Asthma, Bipolar disorder ([facility]), Chronic back pain, COPD (chronic obstructive pulmonary disease) ([facility]), Depression, Eczema of face, Fibromyalgia, Genital warts, GERD (gastroesophageal reflux disease), Hypertension, Neuropathy, Obesity, Osteoarthritis, Psychiatric problem, Restless legs, Sleep apnea, and Stroke ([facility]).    PAST SURGICAL HISTORY:      has a past surgical history that includes back surgery; lumbar fusion; Total knee arthroplasty (Left);  section; Kidney surgery (Right); Appendectomy; Tonsillectomy; Cholecystectomy; and hernia repair.       SOCIAL HISTORY:      reports that she has never smoked. She has never used smokeless tobacco. She reports that she does not currently use alcohol. She reports 
  [address] , [place], [place], [address]    Progress Note    Date:   2024  Patient name:  Tanisha Coleman  Date of admission:  2024  1:59 PM  MRN:   [id number]  YOB: 1965    SUBJECTIVE/Last 24 hours update:     Patient seen and examined at the bed side , no new acute events overnight except for some right sided flank pain that was 9-10/10 and the pain only helps for a little while.. She continues to have dark urine. VSS, afebrile. Notes from nursing staff and Consults had been reviewed, and the overnight progress had been checked with the nursing staff as well.    REVIEW OF SYSTEMS:      CONSTITUTIONAL:  no fevers, no headcahes  EYES: negative for blury vision  HEENT: No headaches, No nasal congestion, no difficulty swallowing  RESPIRATORY:negative for dyspnea, no wheezing, no Cough  CARDIOVASCULAR: negative for chest pain, no palpitations  GASTROINTESTINAL: no nausea, no vomiting, no change in bowel habits, right flank/ abdominal pain   GENITOURINARY: negative for dysuria, no hematuria   MUSCULOSKELETAL: no joint pains, no muscle aches, no swelling of joints or extremities  NEUROLOGICAL: No  Weakness or numbness      PAST MEDICAL HISTORY:      has a past medical history of Anxiety, Arthritis, Asthma, Bipolar disorder ([facility]), Chronic back pain, COPD (chronic obstructive pulmonary disease) ([facility]), Depression, Eczema of face, Fibromyalgia, Genital warts, GERD (gastroesophageal reflux disease), Hypertension, Neuropathy, Obesity, Osteoarthritis, Psychiatric problem, Restless legs, Sleep apnea, and Stroke ([facility]).    PAST SURGICAL HISTORY:      has a past surgical history that includes back surgery; lumbar fusion; Total knee arthroplasty (Left);  section; Kidney surgery (Right); Appendectomy; Tonsillectomy; Cholecystectomy; and hernia repair.       SOCIAL HISTORY:      reports that she has never smoked. She has never used smokeless tobacco. She reports that she does 
Assessment completed. Pt up ad minda in room. Complains of urinary symptoms such as dysuria, frequency. Pt urine is bloody at this time. Hgb stable this am. Pt educated on labs including WBC improvement and Hgb level being stable. Pt states all needs have been met. Pt encouraged to sit in chair and ambulate in hallways. Voiced understanding. Call light in reach.  
Call made to Dr. WALLS's (Urol[employer]) cell phone regarding consult to make sure information was relayed to him this am before the weekend. Requested to return call to floor nurse.   
[facility]  Inpatient/Observation/Outpatient Rehabilitation    Date: 2024  Patient Name: Tanisha Coleman       [x] Inpatient Acute/Observation       []  Outpatient  : 1965         [] Pt no showed for scheduled appointment    [] As a reminder, pt was contacted/attempted contact via phone of upcoming appointments.    [x] Pt refused/declined therapy at this time due to: Pain and fatigue     [] Pt cancelled due to:  [] No Reason Given   [] Sick/ill   [] Other:    [] Evaluation held by RN/Provider due to:    [] High Heart Rate   [] High Blood Pressure   [] Orthopedic Consult   [] Hgb < 7   [] Other:    [] Pt does not require skilled services due to:      Therapist/Assistant will attempt to see this patient, at our earliest opportunity.       Cynthia Espinoza OT Date: 2024   
Comprehensive Nutrition Assessment    Type and Reason for Visit:  Initial    Nutrition Recommendations/Plan:   Encourage oral intakes  Encourage fluids      Malnutrition Assessment:  Malnutrition Status:  No malnutrition (08/09/24 0838)    Context:  Acute Illness     Findings of the 6 clinical characteristics of malnutrition:  Energy Intake:  Mild decrease in energy intake (Comment) (last 2 days)  Weight Loss:  No significant weight loss     Body Fat Loss:  No significant body fat loss     Muscle Mass Loss:  No significant muscle mass loss    Fluid Accumulation:  No significant fluid accumulation     Strength:  Not Performed    Nutrition Assessment:    Inadequate nutrient intakes r/t pain, AEB poor PO x 2 days pta after ureter stent placement and removal. Denies n/v and reports good appetite and intakes usually. Current weight is up from Liliane values but lower than historical highs.  Do expect PO to return to adequate volumes with pain control. Pending urology noted.    Nutrition Related Findings:    active b/s. no edema. Wound Type: None       Current Nutrition Intake & Therapies:    Average Meal Intake: 1-25% (very small bites)  Average Supplements Intake: None Ordered  ADULT DIET; Regular    Anthropometric Measures:  Height: 162.6 cm (5' 4\")  Ideal Body Weight (IBW): 120 lbs (55 kg)    Admission Body Weight: 104.3 kg (230 lb)  Current Body Weight: 109.5 kg (241 lb 4.8 oz), 201.1 % IBW. Weight Source: Bed Scale  Current BMI (kg/m2): 41.4  Usual Body Weight: 105.9 kg (233 lb 6.4 oz) (In June)  % Weight Change (Calculated): 3.4  Weight Adjustment For: No Adjustment                 BMI Categories: Obese Class 3 (BMI 40.0 or greater)    Estimated Daily Nutrient Needs:  Energy Requirements Based On: Kcal/kg  Weight Used for Energy Requirements: Current  Energy (kcal/day): 8898-8153 (12-15)  Weight Used for Protein Requirements: Ideal  Protein (g/day): 71-82 (1.3-1.5)  Method Used for Fluid Requirements: 1 
Due to pt calling out again for pain medications, nurse left room with other patient with alarms on, gave pt pain medications and returned to other the other patients room. Call light within reach, will continue to monitor.   
[facility]  Inpatient/Observation/Outpatient Rehabilitation    Date: 2024  Patient Name: Tanisha Coleman       [x] Inpatient Acute/Observation       []  Outpatient  : 1965           [] Pt no showed for scheduled appointment    [] As a reminder, pt was contacted/attempted contact via phone of upcoming appointments.    [x] Pt refused/declined therapy at this time due to: Pain and fatigue          [] Pt cancelled due to:  [] No Reason Given   [] Sick/ill   [] Other:    [] Evaluation held by RN/Provider due to:    [] High Heart Rate   [] High Blood Pressure   [] Orthopedic Consult   [] Hgb < 7   [] Other:    [] Pt does not require skilled services due to:      Therapist/Assistant will attempt to see this patient, at our earliest opportunity.       Jim Galeano, PT, DPT Date: 2024    
Page Forte RN received a call from Dr. Cronin stating okay to discharge patient. Reanna and Trevorr spoke with patient who states boyfriend can take patient home.    Boyfriend at bedside at this time. Writer removed IV. Writer went over AVS discharge paperwork with patient, instructing medications went to her [facility] pharmacy. Writer also gave paperwork to patient for urinalysis and blood needed on August 13th. Instructed to bring paperwork with her to registration on 13th to complete outpt labwork. Patient acknowledged.    Patient getting dressed at this time, and collecting belongings. Will call out when ready to go. Boyfriend will be taking patient home.   
Patient arrived to [facility], room *315, at this time via cart. Patient independently stood and ambulated to bed. Patient is alert and orientated and on room air. Weight was obtained. Patient having 7/10 pain in right side at this time. Son at bedside.   
Patient is resting in bed at this time. Vitals and assessment completed, see flowsheet for details. A&O x4. Patient reported 9/10 pain in right flank and asked about pain medication, but PRN pain medication not due to be given at this time. Patient reports dysuria and urine is brown in color. Patient denies further needs at this time, call light in reach.  
Patient resting in bed. Assessment and vitals completed at this time, see flowsheet for details. Patient rated pain 8/10 and requested PRN pain medication. Lung sounds clear throughout. Writer emptied hat in toilet and urine is still brown in color. Patient denies any further needs at this time, call light within reach.  
Patient wheelchaired out at this time.   
Pt called out again for pain medications stating \"I have been calling out for an hour for pain medications.\" Nurse let pt know she only knew she wanted anything for pain medications for 10 minutes. Nurse was tied up in other rooms, will get to pt ASAP. Will continue to monitor.   
Pt called out for pain medication at this time. Nurse currently in another room with pt being admitted. Aide let pt know nurse would be in with her pain medication ASAP.  
RESPIRATORY ASSESSMENT PROTOCOL                                                                                              Patient Name: Tanisha Coleman Room#: 0329/0329-01 : 1965     Admitting diagnosis: Acute pyelonephritis [N10]  Flank pain [R10.9]       Medical History:   Past Medical History:   Diagnosis Date    Anxiety     Arthritis     Asthma     Bipolar disorder (HCC)     Chronic back pain     COPD (chronic obstructive pulmonary disease) ([facility])     Depression     Eczema of face     Fibromyalgia     Genital warts     GERD (gastroesophageal reflux disease)     Hypertension     Neuropathy     Obesity     Osteoarthritis     Psychiatric problem     Restless legs     Sleep apnea     Stroke ([facility])        PATIENT ASSESSMENT    LABORATORY DATA  Hematology:   Lab Results   Component Value Date/Time    WBC 9.4 2024 07:11 AM    RBC 3.73 2024 07:11 AM    RBC 4.70 2011 10:06 PM    HGB 11.7 2024 07:11 AM    HGB 13.3 2011 10:06 PM    HCT 35.1 2024 07:11 AM     2024 07:11 AM     Chemistry:  No results found for: \"PHART\", \"MWN6XKS\", \"PO2ART\", \"L7XSYTYA\", \"RZC8XQJ\", \"PBEA\", \"NBEA\"    VITALS  Pulse: 64   Respirations: 18  BP: (!) 147/80  SpO2: 96 % O2 Device: None (Room air)  Temp: 97.8 °F (36.6 °C)    SKIN COLOR  [x] Normal  [] Pale  [] Dusky  [] Cyanotic    RESPIRATORY PATTERN  [x] Normal  [] Dyspnea  [] Cheyne-Quezada  [] Kussmaul  [] Biots    AMBULATORY  [x] Yes  [] No  [] With Assistance      Patient Acuity 0 1 2 3 4 Score   Level of Consciousness (LOC) [x]  Alert & Oriented or Pt normal LOC []  Confused;follows directions []  Confused & uncooper-ative []  Obtunded []  Comatose 0   Respiratory Rate  (RR) [x]  Reg. rate & pattern. 12 - 20 bpm  []  Increased RR. Greater than 20 bpm   []  SOB w/ exertion or RR greater than 24 bpm []  Access- ory muscle use at rest. Abn.  resp. []  SOB at rest.   0   Bilateral Breath Sounds (BBS) [x]  Clear []  Diminish-ed bases  
Reassessment completed and is unchanged. Pt still reports intermittent shooting flank pain and dysuria. Awaiting consult from urology. Pt states symptoms have improved slightly. Pt states she washed up today as well and all needs have been met.  
Spiritual Services Interventions  0329/0329-01   8/9/2024  Mable Garay Nichole Coleman  58 y.o. year old female    Encounter Summary  Encounter Overview/Reason: Initial Encounter  Service Provided For: Patient  Referral/Consult From: (P) Nurse (spiritual consult)  Support System: (P) Unknown  Last Encounter : (P) 08/09/24 (please continue to visit while here)  Complexity of Encounter: (P) Moderate  Begin Time: (P) 1150  End Time : (P) 1205  Total Time Calculated: (P) 15 min                          Assessment/Intervention/Outcome  Assessment: (P) Anxious  Intervention: (P) Active listening, Prayer (assurance of)/[place], Read/Provided Scripture, Life review/Legacy, Explored/Affirmed feelings, thoughts, concerns, Discussed relationship with God (pt feel asleep during end of conversation)  Outcome: (P) Encouraged, Comfort, Engaged in conversation  Plan and Referrals  Plan/Referrals: (P) Continue to visit, (comment)  
Urology consult completed at this time. Dr. Chapman's answering service called and information given for consult. Per answering service, routine consult, Dr. Chapman will receive consult informations in the morning.    
Vital signs and assessment completed and charted. Navigator complete. Home medications gone over. IV Fluids started. Telemetry applied. Nightly medications given.    Patient educated on call light, bed, lights, and room. Writer gone over plan of care and orders with patient. Questions answered. Patient is alert and orientated. Patient is stable on feet and capable of moving about in the room independently. Patient encouraged to call out if patient is feeling lightheadness, unsteady or is in need of any assistance.     Patient educated on using call light to call out for assistance. Patient acknowledged. Call light, bedside table and personal belongings within reach.    
Vitals and assessment done at this time. See flowsheet for more details. Pt denied any shortness of breath, dizziness/light headedness, and urinary retention at this time. Pt stated she has some tenderness in RLQ. Pt stated she is having some pain in the right flank. Pt agreed to using ice for now. Pt denied any further needs at this time. Call light within reach, will continue to monitor.  
Writer at bedside at this time to perform shift assessment. Patient is lying in bed at this time. Vital signs taken and assessment completed at this time. Patient is alert and oriented and has complaint of lower back/abdominal pain 8/10; PRN percocet given at this time. Patient denies any further needs at this time. Call light within reach, care ongoing.     
(BBS) [x]  Clear []  Diminish-ed bases  []  Diminish-ed t/o, or rales   []  Sporadic, scattered wheezes or rhonchi []  Persistentwheezes and, or absent BBS 0   Cough [x]  Strong, effective, & non-prod. []  Effective & prod. Less than 25 ml (2 TBSP) over past 24 hrs []  Ineffective & non-prod to less than 25 ML over past 24 hrs []  Ineffective and, or greater than 25 ml sputum prod. past 24 hrs. []  Nonspon- taneous; Requires suctioning 0   Pulmonary History  (PULM HX) []  No smoking and no chronic pulmonary history []  Former smoker. Quit over 12 mos. ago []  Current smoker or quit w/ in 12 mos [x]  Pulm. History and, or 20 pk/yr smoking hx []  Admitted w/ acute pulm. dx and, or has been admitted w/ pulm. dx 2 or more times over past 12 mos 3   Surgical History this Admit  (SURG HX) [x]  No surgery []  General surgery []  Lower abdominal []  Thoracic or upper abdominal   []  Thoracic w/ pulm. disease 0   Chest X-Ray (CXR)/CT Scan [x]  Clear or not applicable []  Not available []  Atelectasis or pleural effusions []  Localized infiltrate or pulm. edema []  Con-solidated Infiltrates, bilateral, or in more than 1 lobe 0   TOTAL ACUITY: 3       CARE PLAN    If Acuity Level is 2, 3, or 4 in any of the following:    [] BILATERAL BREATH SOUNDS (BBS)     [x] PULMONARY HISTORY (PULM HX)  [] Respiratory Rate  (RR)    Goal: Improve respiratory functions in patients with airway disease and decrease WOB    [x] AEROSOL PROTOCOL    Total Acuity:   14-28  []  Secondary Assessment in 24 hrs Total Acuity:  9-13  []  Secondary Assessment in 24 hrs Total Acuity:  4-8  []  Secondary Assessment in 24 hrs Total Acuity:  0-3  [x]  Secondary Assessment in 48 hrs   HHN AEROSOL THERAPY with  [physician-ordered bronchodilator(s)] q 4 & Albuterol PRN q2 hrs.   Breath-Actuated Neb if BBS Acuity = 4, and pt. can use MP. Notify physician if condition deteriorates.  [facility]N AEROSOL THERAPY with  [physician-ordered bronchodilator(s)]  QID and Albuterol 
Contrast? None   Preliminary Result   1. Right hydronephrosis and hydroureter with perinephric stranding. No   definitive ureteric stone. Punctate right nephrolithiasis.  This may be   related to ureteric edema status post stent removal.   2. Status post cholecystectomy and hysterectomy.   3. Mild fat containing periumbilical hernia.   4. Stable lumbar fusion at L5-S1.               ASSESSMENT / PLAN:    MEDICAL DECISION MAKING:    Primary Problem(s): Acute pyelonephritis  Differential diagnoses: Ureteral stone, trauma from stent removal  Condition is an undiagnosed new problem with uncertain prognosis  Condition is stable  Treatment plan:   Consult urology  Urine culture-pending  No BC done  Monitor labs and replace electrolytes  Imaging: no further imaging studies ordered today  Medications:   Continue Rocephin  Continue Oxycodone as needed for pain  Continue Toradol  IVF  Medication Monitoring / High Risk Medications: none      Hypertension  Condition is a chronic stable condition  Treatment plan: Continue current treatment  Imaging: no further imaging studies ordered today  Medications:   Continue metoprolol     Bipolar 1 disorder  Condition is a chronic stable condition  Treatment plan: Continue current treatment  Imaging: no further imaging studies ordered today  Medications:   Continue sertraline  Continue ropinirole  Continue as needed Ativan    Nutrition status:   at risk for malnutrition  Dietician consult initiated     Hospital Prophylaxis:   DVT: Lovenox   Stress Ulcer: PPI      Disposition:  Shared decision making: All test results, treatment options and disposition options were discussed with the patient today  Social determinants of health that may impact management: none  Code status: Full Code   Disposition: Discharge plan is pending    [facility] Advanced Care Planning documentation:  [] I have confirmed that the patient's Advance Care Plan is present, Code Status is documented, or surrogate decision

## 2024-08-12 NOTE — ADT AUTH CERT
[facility] Subscriber Name/Sex/Relation Subscriber  Subscriber Address/Phone Subscriber Emp/Emp Phone   1. [facility]  [id number] TANISHA SIMPSON - Female  (Self) 1965 [address] [place]  [address]  [phone](H) DISABLED     Utilization Reviews       PA Recommendation Letter by Rebecca Serra RN   Last updated by Rebecca Serra RN on 2024 1608     Review Status Created By   In Primary Rebecca Serra RN       Review Type   --      Criteria Review   Name: Tanisha Simpson  : 1965  CSN: [id number]  INSURANCE: [facility]    Date of admission: 24  Date of discharge:    Current status: Inpatient    RECOMMENDATION:  KEEP CURRENT STATUS. No status change indicated.    RATIONALE: Hypotensive, leukocytosis, on IV antibiotics continued on day 2    Clinical summary 58 y.o. female with acute pyelonephritis status post cystoscopy with ureteral stent placement.  Vitals 91/42  Labs and Imaging 28K    This chart was reviewed at 3:22 PM EST on 2024    Shashank Vega  Physician Advisor  [phone]         Urinary Tract Infection (UTI) Clinical Indications for Admission to Inpatient Care by Rebecca Serra RN   Last updated by Rebecca Serra RN on 2024 1238     Review Status Created By   Primary Completed Rebecca Serra RN       Review Type   Admission      Criteria Review   Urinary Tract Infection (UTI) Clinical Indications for Admission to Inpatient Care     Overall Determination: Indications Met     Criteria:  [×] Admission is indicated for  1 or more  of the following  (1) (2) (3) (4):      [  ] Pregnancy with suspected pyelonephritis, as indicated by  ALL  of the following  (14) (15):          [×] Signs or symptoms of pyelonephritis (eg, fever or flank pain and tenderness)              2024 12:38 PM                  -- 2024 12:38 PM by Rebecca Serra RN --                      Subright CVA tenderness and right lower quadrant

## 2024-08-13 ENCOUNTER — OFFICE VISIT (OUTPATIENT)
Dept: PRIMARY CARE CLINIC | Age: 59
End: 2024-08-13
Payer: COMMERCIAL

## 2024-08-13 ENCOUNTER — HOSPITAL ENCOUNTER (OUTPATIENT)
Age: 59
Discharge: HOME | End: 2024-08-13
Payer: COMMERCIAL

## 2024-08-13 VITALS
SYSTOLIC BLOOD PRESSURE: 112 MMHG | TEMPERATURE: 97.7 F | BODY MASS INDEX: 40.68 KG/M2 | HEART RATE: 67 BPM | WEIGHT: 237 LBS | RESPIRATION RATE: 16 BRPM | DIASTOLIC BLOOD PRESSURE: 74 MMHG | OXYGEN SATURATION: 96 %

## 2024-08-13 DIAGNOSIS — Z09 HOSPITAL DISCHARGE FOLLOW-UP: Primary | ICD-10-CM

## 2024-08-13 DIAGNOSIS — R10.9 FLANK PAIN: ICD-10-CM

## 2024-08-13 LAB
ALBUMIN SERPL-MCNC: 3.8 G/DL (ref 3.5–5.2)
ALBUMIN/GLOB SERPL: 1.1 {RATIO} (ref 1–2.5)
ALP SERPL-CCNC: 79 U/L (ref 35–104)
ALT SERPL-CCNC: 26 U/L (ref 5–33)
ANION GAP SERPL CALCULATED.3IONS-SCNC: 17 MMOL/L (ref 9–17)
AST SERPL-CCNC: 32 U/L
BACTERIA URNS QL MICRO: ABNORMAL
BASOPHILS # BLD: 0.13 K/UL (ref 0–0.2)
BASOPHILS NFR BLD: 1 % (ref 0–2)
BILIRUB SERPL-MCNC: 0.4 MG/DL (ref 0.3–1.2)
BILIRUB UR QL STRIP: ABNORMAL
BUN SERPL-MCNC: 10 MG/DL (ref 6–20)
BUN/CREAT SERPL: 13 (ref 9–20)
CALCIUM SERPL-MCNC: 9.4 MG/DL (ref 8.6–10.4)
CHLORIDE SERPL-SCNC: 96 MMOL/L (ref 98–107)
CLARITY UR: CLEAR
CO2 SERPL-SCNC: 25 MMOL/L (ref 20–31)
COLOR UR: YELLOW
CREAT SERPL-MCNC: 0.8 MG/DL (ref 0.5–0.9)
EOSINOPHIL # BLD: 0.41 K/UL (ref 0–0.44)
EOSINOPHILS RELATIVE PERCENT: 3 % (ref 1–4)
EPI CELLS #/AREA URNS HPF: ABNORMAL /HPF (ref 0–25)
ERYTHROCYTE [DISTWIDTH] IN BLOOD BY AUTOMATED COUNT: 12.8 % (ref 11.8–14.4)
GFR, ESTIMATED: 86 ML/MIN/1.73M2
GLUCOSE SERPL-MCNC: 137 MG/DL (ref 70–99)
GLUCOSE UR STRIP-MCNC: NEGATIVE MG/DL
HCT VFR BLD AUTO: 43.8 % (ref 36.3–47.1)
HGB BLD-MCNC: 14.7 G/DL (ref 11.9–15.1)
HGB UR QL STRIP.AUTO: ABNORMAL
IMM GRANULOCYTES # BLD AUTO: 0.16 K/UL (ref 0–0.3)
IMM GRANULOCYTES NFR BLD: 1 %
KETONES UR STRIP-MCNC: NEGATIVE MG/DL
LEUKOCYTE ESTERASE UR QL STRIP: NEGATIVE
LYMPHOCYTES NFR BLD: 2.03 K/UL (ref 1.1–3.7)
LYMPHOCYTES RELATIVE PERCENT: 16 % (ref 24–43)
MCH RBC QN AUTO: 31.2 PG (ref 25.2–33.5)
MCHC RBC AUTO-ENTMCNC: 33.6 G/DL (ref 28.4–34.8)
MCV RBC AUTO: 93 FL (ref 82.6–102.9)
MONOCYTES NFR BLD: 0.62 K/UL (ref 0.1–1.2)
MONOCYTES NFR BLD: 5 % (ref 3–12)
MUCOUS THREADS URNS QL MICRO: ABNORMAL
NEUTROPHILS NFR BLD: 74 % (ref 36–65)
NEUTS SEG NFR BLD: 9.68 K/UL (ref 1.5–8.1)
NITRITE UR QL STRIP: NEGATIVE
NRBC BLD-RTO: 0 PER 100 WBC
PH UR STRIP: 6.5 [PH] (ref 5–9)
PLATELET # BLD AUTO: 247 K/UL (ref 138–453)
PMV BLD AUTO: 10 FL (ref 8.1–13.5)
POTASSIUM SERPL-SCNC: 4 MMOL/L (ref 3.7–5.3)
PROT SERPL-MCNC: 7.2 G/DL (ref 6.4–8.3)
PROT UR STRIP-MCNC: ABNORMAL MG/DL
RBC # BLD AUTO: 4.71 M/UL (ref 3.95–5.11)
RBC #/AREA URNS HPF: ABNORMAL /HPF (ref 0–2)
SODIUM SERPL-SCNC: 138 MMOL/L (ref 135–144)
SP GR UR STRIP: 1.02 (ref 1.01–1.02)
UROBILINOGEN UR STRIP-ACNC: NORMAL EU/DL (ref 0–1)
WBC #/AREA URNS HPF: ABNORMAL /HPF (ref 0–5)
WBC OTHER # BLD: 13 K/UL (ref 3.5–11.3)

## 2024-08-13 PROCEDURE — 3078F DIAST BP <80 MM HG: CPT | Performed by: NURSE PRACTITIONER

## 2024-08-13 PROCEDURE — 80053 COMPREHEN METABOLIC PANEL: CPT

## 2024-08-13 PROCEDURE — 1036F TOBACCO NON-USER: CPT | Performed by: NURSE PRACTITIONER

## 2024-08-13 PROCEDURE — 36415 COLL VENOUS BLD VENIPUNCTURE: CPT

## 2024-08-13 PROCEDURE — 1111F DSCHRG MED/CURRENT MED MERGE: CPT | Performed by: NURSE PRACTITIONER

## 2024-08-13 PROCEDURE — G8427 DOCREV CUR MEDS BY ELIG CLIN: HCPCS | Performed by: NURSE PRACTITIONER

## 2024-08-13 PROCEDURE — G8417 CALC BMI ABV UP PARAM F/U: HCPCS | Performed by: NURSE PRACTITIONER

## 2024-08-13 PROCEDURE — 85025 COMPLETE CBC W/AUTO DIFF WBC: CPT

## 2024-08-13 PROCEDURE — 81001 URINALYSIS AUTO W/SCOPE: CPT

## 2024-08-13 PROCEDURE — 3074F SYST BP LT 130 MM HG: CPT | Performed by: NURSE PRACTITIONER

## 2024-08-13 PROCEDURE — 3017F COLORECTAL CA SCREEN DOC REV: CPT | Performed by: NURSE PRACTITIONER

## 2024-08-13 PROCEDURE — 99213 OFFICE O/P EST LOW 20 MIN: CPT | Performed by: NURSE PRACTITIONER

## 2024-08-13 ASSESSMENT — PATIENT HEALTH QUESTIONNAIRE - PHQ9
SUM OF ALL RESPONSES TO PHQ QUESTIONS 1-9: 0
SUM OF ALL RESPONSES TO PHQ QUESTIONS 1-9: 0
5. POOR APPETITE OR OVEREATING: NOT AT ALL
6. FEELING BAD ABOUT YOURSELF - OR THAT YOU ARE A FAILURE OR HAVE LET YOURSELF OR YOUR FAMILY DOWN: NOT AT ALL
SUM OF ALL RESPONSES TO PHQ QUESTIONS 1-9: 0
1. LITTLE INTEREST OR PLEASURE IN DOING THINGS: NOT AT ALL
SUM OF ALL RESPONSES TO PHQ QUESTIONS 1-9: 0
9. THOUGHTS THAT YOU WOULD BE BETTER OFF DEAD, OR OF HURTING YOURSELF: NOT AT ALL
4. FEELING TIRED OR HAVING LITTLE ENERGY: NOT AT ALL
10. IF YOU CHECKED OFF ANY PROBLEMS, HOW DIFFICULT HAVE THESE PROBLEMS MADE IT FOR YOU TO DO YOUR WORK, TAKE CARE OF THINGS AT HOME, OR GET ALONG WITH OTHER PEOPLE: NOT DIFFICULT AT ALL
8. MOVING OR SPEAKING SO SLOWLY THAT OTHER PEOPLE COULD HAVE NOTICED. OR THE OPPOSITE, BEING SO FIGETY OR RESTLESS THAT YOU HAVE BEEN MOVING AROUND A LOT MORE THAN USUAL: NOT AT ALL
SUM OF ALL RESPONSES TO PHQ9 QUESTIONS 1 & 2: 0
3. TROUBLE FALLING OR STAYING ASLEEP: NOT AT ALL
7. TROUBLE CONCENTRATING ON THINGS, SUCH AS READING THE NEWSPAPER OR WATCHING TELEVISION: NOT AT ALL
2. FEELING DOWN, DEPRESSED OR HOPELESS: NOT AT ALL

## 2024-08-13 NOTE — PATIENT INSTRUCTIONS
SURVEY:     You may be receiving a survey from [facility] regarding your visit today.     Please complete the survey to enable us to provide the highest quality of care to you and your family.     If you cannot score us a very good on any question, please call the office to discuss how we could have made your experience a very good one.     Thank you,    Melchor Schneider, APRN-CNP  Maddi Starks, APRN-CNP  Hermelinda, LPN  Kirsten, CMA  Silvano, CMA  Linda, CMA  Leslie, PCA  Consuelo, CMA  Sonia, PM

## 2024-08-14 ENCOUNTER — TELEPHONE (OUTPATIENT)
Dept: PRIMARY CARE CLINIC | Age: 59
End: 2024-08-14

## 2024-08-14 NOTE — TELEPHONE ENCOUNTER
----- Message from AIMEE Pederson CNP sent at 8/14/2024  8:17 AM EDT -----  Please notify patient of stable lab results.  Ask her if she has made an appointment to follow up with Urology?  Thanks Maddi  
Called and let patient know, she voiced she has not contacted urology for a follow up appointment. I let her know she needs to call today to get an appointment scheduled. She voiced understanding.   
Yes

## 2024-08-22 DIAGNOSIS — D52.9 ANEMIA DUE TO FOLIC ACID DEFICIENCY, UNSPECIFIED DEFICIENCY TYPE: ICD-10-CM

## 2024-08-22 DIAGNOSIS — D50.9 IRON DEFICIENCY ANEMIA, UNSPECIFIED IRON DEFICIENCY ANEMIA TYPE: ICD-10-CM

## 2024-08-22 RX ORDER — SENNOSIDES A AND B 8.6 MG/1
1 TABLET, FILM COATED ORAL DAILY
Qty: 90 TABLET | Refills: 0 | Status: SHIPPED | OUTPATIENT
Start: 2024-08-22

## 2024-08-22 RX ORDER — FERROUS SULFATE 325(65) MG
325 TABLET ORAL
Qty: 90 TABLET | Refills: 1 | Status: SHIPPED | OUTPATIENT
Start: 2024-08-22

## 2024-08-22 NOTE — TELEPHONE ENCOUNTER
Health Maintenance   Topic Date Due    Colorectal Cancer Screen  Never done    Pneumococcal 0-64 years Vaccine (2 of 2 - PPSV23 or PCV20) 12/29/2020    Flu vaccine (1) 08/01/2024    Hepatitis B vaccine (1 of 3 - 19+ 3-dose series) 10/02/2024 (Originally 9/7/1985)    DTaP/Tdap/Td vaccine (1 - Tdap) 04/03/2025 (Originally 7/10/2019)    Hepatitis C screen  04/03/2025 (Originally 9/7/1984)    HIV screen  04/03/2025 (Originally 9/7/1981)    Lipids  01/21/2025    Depression Monitoring  08/13/2025    Breast cancer screen  06/20/2026    Diabetes screen  10/10/2026    Cervical cancer screen  02/24/2027    Shingles vaccine  Completed    COVID-19 Vaccine  Completed    Hepatitis A vaccine  Aged Out    Hib vaccine  Aged Out    Polio vaccine  Aged Out    Meningococcal (ACWY) vaccine  Aged Out             (applicable per patient's age: Cancer Screenings, Depression Screening, Fall Risk Screening, Immunizations)    Hemoglobin A1C (%)   Date Value   10/10/2023 5.1     AST (U/L)   Date Value   08/13/2024 32 (H)     ALT (U/L)   Date Value   08/13/2024 26     BUN (mg/dL)   Date Value   08/13/2024 10      (goal A1C is < 7)   (goal LDL is <100) need 30-50% reduction from baseline     BP Readings from Last 3 Encounters:   08/13/24 112/74   08/11/24 (!) 101/54   08/08/24 (!) 124/57    (goal /80)      All Future Testing planned in [facility]:  Lab Frequency Next Occurrence   CT CHEST ABDOMEN PELVIS WO CONTRAST Additional Contrast? Radiologist Recommendation Once 10/10/2023   Iron and TIBC Once 06/11/2024   Vitamin B12 & Folate Once 06/11/2024   Ferritin Once 06/11/2024   US NON OB TRANSVAGINAL Once 06/25/2024       Next Visit Date:  Future Appointments   Date Time Provider Department Center   11/13/2024  1:40 PM Maddi Starks, APRN - CNP [facility]            Patient Active Problem List:     Bipolar 1 disorder (HCC)     Chest pain in adult     Hypertension     Depression with suicidal ideation     Acute

## 2024-08-30 DIAGNOSIS — F41.9 ANXIETY: Primary | ICD-10-CM

## 2024-08-30 RX ORDER — LORAZEPAM 1 MG/1
1 TABLET ORAL 2 TIMES DAILY
Qty: 120 TABLET | Refills: 0 | Status: SHIPPED | OUTPATIENT
Start: 2024-08-30 | End: 2024-10-29

## 2024-08-30 RX ORDER — ATORVASTATIN CALCIUM 40 MG/1
40 TABLET, FILM COATED ORAL NIGHTLY
Qty: 30 TABLET | Refills: 3 | Status: SHIPPED | OUTPATIENT
Start: 2024-08-30

## 2024-08-30 RX ORDER — ASPIRIN 81 MG/1
81 TABLET ORAL DAILY
Qty: 30 TABLET | Refills: 3 | Status: SHIPPED | OUTPATIENT
Start: 2024-08-30

## 2024-08-30 NOTE — TELEPHONE ENCOUNTER
Health Maintenance   Topic Date Due    Colorectal Cancer Screen  Never done    Pneumococcal 0-64 years Vaccine (2 of 2 - PPSV23 or PCV20) 12/29/2020    Flu vaccine (1) 08/01/2024    Hepatitis B vaccine (1 of 3 - 19+ 3-dose series) 10/02/2024 (Originally 9/7/1985)    DTaP/Tdap/Td vaccine (1 - Tdap) 04/03/2025 (Originally 7/10/2019)    Hepatitis C screen  04/03/2025 (Originally 9/7/1984)    HIV screen  04/03/2025 (Originally 9/7/1981)    Lipids  01/21/2025    Depression Monitoring  08/13/2025    Breast cancer screen  06/20/2026    Diabetes screen  10/10/2026    Cervical cancer screen  02/24/2027    Shingles vaccine  Completed    COVID-19 Vaccine  Completed    Hepatitis A vaccine  Aged Out    Hib vaccine  Aged Out    Polio vaccine  Aged Out    Meningococcal (ACWY) vaccine  Aged Out             (applicable per patient's age: Cancer Screenings, Depression Screening, Fall Risk Screening, Immunizations)    Hemoglobin A1C (%)   Date Value   10/10/2023 5.1     AST (U/L)   Date Value   08/13/2024 32 (H)     ALT (U/L)   Date Value   08/13/2024 26     BUN (mg/dL)   Date Value   08/13/2024 10      (goal A1C is < 7)   (goal LDL is <100) need 30-50% reduction from baseline     BP Readings from Last 3 Encounters:   08/13/24 112/74   08/11/24 (!) 101/54   08/08/24 (!) 124/57    (goal /80)      All Future Testing planned in [facility]:  Lab Frequency Next Occurrence   CT CHEST ABDOMEN PELVIS WO CONTRAST Additional Contrast? Radiologist Recommendation Once 10/10/2023   Iron and TIBC Once 06/11/2024   Vitamin B12 & Folate Once 06/11/2024   Ferritin Once 06/11/2024   US NON OB TRANSVAGINAL Once 06/25/2024       Next Visit Date:  Future Appointments   Date Time Provider Department Center   11/13/2024  1:40 PM Maddi Starks, APRN - CNP [facility]            Patient Active Problem List:     Bipolar 1 disorder (HCC)     Chest pain in adult     Hypertension     Depression with suicidal ideation     Acute  pyelonephritis     Status post cystoscopy with ureteral stent placement

## 2024-09-30 DIAGNOSIS — G25.81 RLS (RESTLESS LEGS SYNDROME): ICD-10-CM

## 2024-09-30 RX ORDER — ROPINIROLE 2 MG/1
TABLET, FILM COATED ORAL
Qty: 180 TABLET | Refills: 0 | Status: SHIPPED | OUTPATIENT
Start: 2024-09-30

## 2024-09-30 RX ORDER — METOPROLOL TARTRATE 50 MG
TABLET ORAL
Qty: 90 TABLET | Refills: 1 | Status: SHIPPED | OUTPATIENT
Start: 2024-09-30

## 2024-09-30 NOTE — TELEPHONE ENCOUNTER
Health Maintenance   Topic Date Due    Colorectal Cancer Screen  Never done    Pneumococcal 0-64 years Vaccine (2 of 2 - PPSV23 or PCV20) 12/29/2020    Flu vaccine (1) 08/01/2024    Hepatitis B vaccine (1 of 3 - 19+ 3-dose series) 10/02/2024 (Originally 9/7/1985)    DTaP/Tdap/Td vaccine (1 - Tdap) 04/03/2025 (Originally 7/10/2019)    Hepatitis C screen  04/03/2025 (Originally 9/7/1984)    HIV screen  04/03/2025 (Originally 9/7/1981)    Lipids  01/21/2025    Depression Monitoring  08/13/2025    Breast cancer screen  06/20/2026    Diabetes screen  10/10/2026    Cervical cancer screen  02/24/2027    Shingles vaccine  Completed    COVID-19 Vaccine  Completed    Hepatitis A vaccine  Aged Out    Hib vaccine  Aged Out    Polio vaccine  Aged Out    Meningococcal (ACWY) vaccine  Aged Out             (applicable per patient's age: Cancer Screenings, Depression Screening, Fall Risk Screening, Immunizations)    Hemoglobin A1C (%)   Date Value   10/10/2023 5.1     AST (U/L)   Date Value   08/13/2024 32 (H)     ALT (U/L)   Date Value   08/13/2024 26     BUN (mg/dL)   Date Value   08/13/2024 10      (goal A1C is < 7)   (goal LDL is <100) need 30-50% reduction from baseline     BP Readings from Last 3 Encounters:   08/13/24 112/74   08/11/24 (!) 101/54   08/08/24 (!) 124/57    (goal /80)      All Future Testing planned in [facility]:  Lab Frequency Next Occurrence   CT CHEST ABDOMEN PELVIS WO CONTRAST Additional Contrast? Radiologist Recommendation Once 10/10/2023   Iron and TIBC Once 06/11/2024   Vitamin B12 & Folate Once 06/11/2024   Ferritin Once 06/11/2024   US NON OB TRANSVAGINAL Once 06/25/2024       Next Visit Date:  Future Appointments   Date Time Provider Department Center   10/17/2024  2:20 PM Maddi Starks, APRN - CNP [facility]   11/13/2024  1:40 PM Maddi Starks APRN - CNP [facility]            Patient Active Problem List:     Bipolar 1 disorder (HCC)     Chest pain in adult

## 2024-10-10 ENCOUNTER — OFFICE VISIT (OUTPATIENT)
Dept: PRIMARY CARE CLINIC | Age: 59
End: 2024-10-10

## 2024-10-10 VITALS
HEART RATE: 66 BPM | BODY MASS INDEX: 40.27 KG/M2 | SYSTOLIC BLOOD PRESSURE: 126 MMHG | TEMPERATURE: 97.3 F | DIASTOLIC BLOOD PRESSURE: 64 MMHG | OXYGEN SATURATION: 97 % | WEIGHT: 234.6 LBS | RESPIRATION RATE: 16 BRPM

## 2024-10-10 DIAGNOSIS — R00.0 TACHYCARDIA, UNSPECIFIED: ICD-10-CM

## 2024-10-10 DIAGNOSIS — J44.1 COPD WITH ACUTE EXACERBATION (HCC): ICD-10-CM

## 2024-10-10 DIAGNOSIS — R00.2 HEART PALPITATIONS: Primary | ICD-10-CM

## 2024-10-10 DIAGNOSIS — M25.512 ACUTE PAIN OF LEFT SHOULDER: ICD-10-CM

## 2024-10-10 ASSESSMENT — PATIENT HEALTH QUESTIONNAIRE - PHQ9
SUM OF ALL RESPONSES TO PHQ QUESTIONS 1-9: 0
4. FEELING TIRED OR HAVING LITTLE ENERGY: NOT AT ALL
10. IF YOU CHECKED OFF ANY PROBLEMS, HOW DIFFICULT HAVE THESE PROBLEMS MADE IT FOR YOU TO DO YOUR WORK, TAKE CARE OF THINGS AT HOME, OR GET ALONG WITH OTHER PEOPLE: NOT DIFFICULT AT ALL
SUM OF ALL RESPONSES TO PHQ9 QUESTIONS 1 & 2: 0
5. POOR APPETITE OR OVEREATING: NOT AT ALL
6. FEELING BAD ABOUT YOURSELF - OR THAT YOU ARE A FAILURE OR HAVE LET YOURSELF OR YOUR FAMILY DOWN: NOT AT ALL
3. TROUBLE FALLING OR STAYING ASLEEP: NOT AT ALL
1. LITTLE INTEREST OR PLEASURE IN DOING THINGS: NOT AT ALL
8. MOVING OR SPEAKING SO SLOWLY THAT OTHER PEOPLE COULD HAVE NOTICED. OR THE OPPOSITE, BEING SO FIGETY OR RESTLESS THAT YOU HAVE BEEN MOVING AROUND A LOT MORE THAN USUAL: NOT AT ALL
9. THOUGHTS THAT YOU WOULD BE BETTER OFF DEAD, OR OF HURTING YOURSELF: NOT AT ALL
SUM OF ALL RESPONSES TO PHQ QUESTIONS 1-9: 0
2. FEELING DOWN, DEPRESSED OR HOPELESS: NOT AT ALL
SUM OF ALL RESPONSES TO PHQ QUESTIONS 1-9: 0
SUM OF ALL RESPONSES TO PHQ QUESTIONS 1-9: 0
7. TROUBLE CONCENTRATING ON THINGS, SUCH AS READING THE NEWSPAPER OR WATCHING TELEVISION: NOT AT ALL

## 2024-10-10 ASSESSMENT — ENCOUNTER SYMPTOMS
RESPIRATORY NEGATIVE: 1
EYES NEGATIVE: 1
ALLERGIC/IMMUNOLOGIC NEGATIVE: 1
GASTROINTESTINAL NEGATIVE: 1

## 2024-10-10 NOTE — PATIENT INSTRUCTIONS
SURVEY:     You may be receiving a survey from [facility] regarding your visit today.     Please complete the survey to enable us to provide the highest quality of care to you and your family.     If you cannot score us a very good on any question, please call the office to discuss how we could have made your experience a very good one.     Thank you,    Melchor Schneider, APRN-CNP  Maddi Starks, APRN-CNP  Hermelinda, LPN  Kirsten, CMA  Silvano, CMA  Linda, CMA  Leslie, PCA  Consuelo, CMA  Sonia, PM

## 2024-10-10 NOTE — PROGRESS NOTES
[facility]  CARMEN WALL, CINDY  [facility]  [address] [address]  Dept: [phone]  Dept Fax: [phone]      Name: Tanisha Coleman  : 1965         Chief Complaint:     Chief Complaint   Patient presents with    Tachycardia     X 1 week. Patient c/o fast heart beat and feels as it was taking her breath away and getting hot and sweaty. Patient does notice it happens several times a day. Patient denies chest pain.     Shoulder Pain     Patient c/o left shoulder pain. Patient noticed a bruise but does not remember falling. X 3 days.        History of Present Illness:      Tanisha Coleman is a 59 y.o.  female who presents with Tachycardia (X 1 week. Patient c/o fast heart beat and feels as it was taking her breath away and getting hot and sweaty. Patient does notice it happens several times a day. Patient denies chest pain. ) and Shoulder Pain (Patient c/o left shoulder pain. Patient noticed a bruise but does not remember falling. X 3 days. )      HPI  Tanisha is here today for complaints of tachycardia and palpitations that started about 2 weeks ago.  She denies chest  pain with this but the tachycardia will take her breath away.  It will hit her at rest and/or with activity.  She states it will go away on its own and will just last a couple of minutes.  She denies being able to do anything to make it worse or better.  She is scared and having increased anxiety about this because her sister  last month of a heart attack at 55 years old.    Tanisha is having left shoulder pain with decreased ROM.  She denies a known injury but she does have a large bruise on back of her shoulder.  She states this has been going on for a couple days.  She states it also hurts to touch.  Increased pain with raising her arm.  She is feeling her shoulder \"sticking and snapping\" with movement.  She does get improvement with her pain medication.    Past Medical History:     Past

## 2024-10-16 DIAGNOSIS — D52.9 ANEMIA DUE TO FOLIC ACID DEFICIENCY, UNSPECIFIED DEFICIENCY TYPE: ICD-10-CM

## 2024-10-16 RX ORDER — FOLIC ACID 1 MG/1
1 TABLET ORAL DAILY
Qty: 90 TABLET | Refills: 1 | Status: SHIPPED | OUTPATIENT
Start: 2024-10-16

## 2024-10-16 NOTE — TELEPHONE ENCOUNTER
Health Maintenance   Topic Date Due    Colorectal Cancer Screen  Never done    Pneumococcal 0-64 years Vaccine (2 of 2 - PPSV23 or PCV20) 12/29/2020    Flu vaccine (1) 08/01/2024    DTaP/Tdap/Td vaccine (1 - Tdap) 04/03/2025 (Originally 7/10/2019)    Hepatitis C screen  04/03/2025 (Originally 9/7/1984)    HIV screen  04/03/2025 (Originally 9/7/1981)    Hepatitis B vaccine (1 of 3 - 19+ 3-dose series) 10/10/2025 (Originally 9/7/1985)    Lipids  01/21/2025    Depression Monitoring  10/10/2025    Breast cancer screen  06/20/2026    Diabetes screen  10/10/2026    Cervical cancer screen  02/24/2027    Shingles vaccine  Completed    COVID-19 Vaccine  Completed    Hepatitis A vaccine  Aged Out    Hib vaccine  Aged Out    Polio vaccine  Aged Out    Meningococcal (ACWY) vaccine  Aged Out             (applicable per patient's age: Cancer Screenings, Depression Screening, Fall Risk Screening, Immunizations)    Hemoglobin A1C (%)   Date Value   10/10/2023 5.1     AST (U/L)   Date Value   08/13/2024 32 (H)     ALT (U/L)   Date Value   08/13/2024 26     BUN (mg/dL)   Date Value   08/13/2024 10      (goal A1C is < 7)   (goal LDL is <100) need 30-50% reduction from baseline     BP Readings from Last 3 Encounters:   10/10/24 126/64   08/13/24 112/74   08/11/24 (!) 101/54    (goal /80)      All Future Testing planned in [facility]:  Lab Frequency Next Occurrence   Iron and TIBC Once 06/11/2024   Vitamin B12 & Folate Once 06/11/2024   Ferritin Once 06/11/2024   US NON OB TRANSVAGINAL Once 06/25/2024   EKG 12 lead Once 10/10/2024   Cardiac event/MCOT monitor Once 10/10/2024   XR SHOULDER LEFT (MIN 2 VIEWS) Once 10/10/2024       Next Visit Date:  Future Appointments   Date Time Provider Department Center   10/23/2024 11:00 AM Ruddy Quintero, PT [facility] [place]   11/13/2024  1:40 PM Maddi Starks, APRN - CNP [facility]            Patient Active Problem List:     Bipolar 1 disorder (HCC)     Chest pain in adult

## 2024-10-17 ENCOUNTER — CARE COORDINATION (OUTPATIENT)
Dept: CARE COORDINATION | Age: 59
End: 2024-10-17

## 2024-10-17 NOTE — CARE COORDINATION
Ambulatory Care Coordination Note     10/17/2024 2:24 PM     Patient outreach attempt by this AC today to offer care management services. [facility] was unable to reach the patient by telephone today; left voice message requesting a return phone call to this ACM.     ACM: Alea Trujillo RN     Care Summary Note: PCP referral for care coordination     PCP/Specialist follow up:   Future Appointments         Provider Specialty Dept Phone    10/23/2024 11:00 AM Ruddy Quintero, PT Physical Therapy [phone]    11/13/2024 1:40 PM Maddi Starks, APRN - [facility] [phone]            Follow Up:   Plan for next AC outreach in approximately 1 week to complete:  - outreach attempt to offer care management services.   -EKG, event monitor and X-ray need scheduled.

## 2024-10-18 ENCOUNTER — HOSPITAL ENCOUNTER (OUTPATIENT)
Age: 59
Discharge: HOME | End: 2024-10-20
Payer: COMMERCIAL

## 2024-10-18 ENCOUNTER — HOSPITAL ENCOUNTER (OUTPATIENT)
Age: 59
Discharge: HOME | End: 2024-10-18
Payer: COMMERCIAL

## 2024-10-18 ENCOUNTER — HOSPITAL ENCOUNTER (OUTPATIENT)
Dept: GENERAL RADIOLOGY | Age: 59
Discharge: HOME | End: 2024-10-20
Payer: COMMERCIAL

## 2024-10-18 DIAGNOSIS — R00.2 HEART PALPITATIONS: ICD-10-CM

## 2024-10-18 DIAGNOSIS — R00.0 TACHYCARDIA, UNSPECIFIED: ICD-10-CM

## 2024-10-18 DIAGNOSIS — K21.9 GASTROESOPHAGEAL REFLUX DISEASE WITHOUT ESOPHAGITIS: Primary | ICD-10-CM

## 2024-10-18 DIAGNOSIS — M25.512 ACUTE PAIN OF LEFT SHOULDER: ICD-10-CM

## 2024-10-18 LAB
EKG ATRIAL RATE: 67 BPM
EKG P AXIS: 62 DEGREES
EKG P-R INTERVAL: 158 MS
EKG Q-T INTERVAL: 400 MS
EKG QRS DURATION: 78 MS
EKG QTC CALCULATION (BAZETT): 422 MS
EKG R AXIS: 0 DEGREES
EKG T AXIS: 42 DEGREES
EKG VENTRICULAR RATE: 67 BPM

## 2024-10-18 PROCEDURE — 93243 EXT ECG>48HR<7D SCAN A/R: CPT

## 2024-10-18 PROCEDURE — 73030 X-RAY EXAM OF SHOULDER: CPT

## 2024-10-18 NOTE — TELEPHONE ENCOUNTER
Health Maintenance   Topic Date Due    Colorectal Cancer Screen  Never done    Pneumococcal 0-64 years Vaccine (2 of 2 - PPSV23 or PCV20) 12/29/2020    Flu vaccine (1) 08/01/2024    DTaP/Tdap/Td vaccine (1 - Tdap) 04/03/2025 (Originally 7/10/2019)    Hepatitis C screen  04/03/2025 (Originally 9/7/1984)    HIV screen  04/03/2025 (Originally 9/7/1981)    Hepatitis B vaccine (1 of 3 - 19+ 3-dose series) 10/10/2025 (Originally 9/7/1985)    Lipids  01/21/2025    Depression Monitoring  10/10/2025    Breast cancer screen  06/20/2026    Diabetes screen  10/10/2026    Cervical cancer screen  02/24/2027    Shingles vaccine  Completed    COVID-19 Vaccine  Completed    Hepatitis A vaccine  Aged Out    Hib vaccine  Aged Out    Polio vaccine  Aged Out    Meningococcal (ACWY) vaccine  Aged Out             (applicable per patient's age: Cancer Screenings, Depression Screening, Fall Risk Screening, Immunizations)    Hemoglobin A1C (%)   Date Value   10/10/2023 5.1     AST (U/L)   Date Value   08/13/2024 32 (H)     ALT (U/L)   Date Value   08/13/2024 26     BUN (mg/dL)   Date Value   08/13/2024 10      (goal A1C is < 7)   (goal LDL is <100) need 30-50% reduction from baseline     BP Readings from Last 3 Encounters:   10/10/24 126/64   08/13/24 112/74   08/11/24 (!) 101/54    (goal /80)      All Future Testing planned in [facility]:  Lab Frequency Next Occurrence   Iron and TIBC Once 06/11/2024   Vitamin B12 & Folate Once 06/11/2024   Ferritin Once 06/11/2024   US NON OB TRANSVAGINAL Once 06/25/2024       Next Visit Date:  Future Appointments   Date Time Provider Department Center   10/23/2024 11:00 AM Ruddy Quintero, PT [facility] [place]   11/13/2024  1:40 PM Maddi Starks, APRN - CNP [facility]            Patient Active Problem List:     Bipolar 1 disorder (HCC)     Chest pain in adult     Hypertension     Depression with suicidal ideation     Acute pyelonephritis     Status post cystoscopy with ureteral stent

## 2024-10-21 ENCOUNTER — TELEPHONE (OUTPATIENT)
Dept: PRIMARY CARE CLINIC | Age: 59
End: 2024-10-21

## 2024-10-21 RX ORDER — PANTOPRAZOLE SODIUM 40 MG/1
40 TABLET, DELAYED RELEASE ORAL DAILY
Qty: 90 TABLET | Refills: 1 | Status: SHIPPED | OUTPATIENT
Start: 2024-10-21

## 2024-10-21 NOTE — TELEPHONE ENCOUNTER
----- Message from AIMEE Pederson CNP sent at 10/21/2024  9:00 AM EDT -----  Please notify patient of normal EKG results.  Thanks Maddi

## 2024-10-23 ENCOUNTER — TELEPHONE (OUTPATIENT)
Dept: PRIMARY CARE CLINIC | Age: 59
End: 2024-10-23

## 2024-10-23 ENCOUNTER — CARE COORDINATION (OUTPATIENT)
Dept: CARE COORDINATION | Age: 59
End: 2024-10-23

## 2024-10-23 NOTE — CARE COORDINATION
Ambulatory Care Coordination Note     10/23/2024 2:37 PM     -EKG completed 10/18- has been informed of normal result   -X-ray completed 10/18- has been notified of normal result   -Extended heart monitor placed 10/18-active (on going)   -Has PCP follow up scheduled   -No SDOH barriers noted at this time     Future Appointments   Date Time Provider Department Center   11/13/2024  1:40 PM Maddi Starks APRN - CNP [facility]

## 2024-10-23 NOTE — TELEPHONE ENCOUNTER
----- Message from AIMEE Pederson CNP sent at 10/23/2024  8:32 AM EDT -----  Please notify patient of normal xray results.  Thanks Maddi

## 2024-10-29 DIAGNOSIS — F41.9 ANXIETY: ICD-10-CM

## 2024-10-29 NOTE — TELEPHONE ENCOUNTER
Health Maintenance   Topic Date Due    Colorectal Cancer Screen  Never done    Pneumococcal 0-64 years Vaccine (2 of 2 - PPSV23 or PCV20) 12/29/2020    Flu vaccine (1) 08/01/2024    DTaP/Tdap/Td vaccine (1 - Tdap) 04/03/2025 (Originally 7/10/2019)    Hepatitis C screen  04/03/2025 (Originally 9/7/1984)    HIV screen  04/03/2025 (Originally 9/7/1981)    Hepatitis B vaccine (1 of 3 - 19+ 3-dose series) 10/10/2025 (Originally 9/7/1985)    Lipids  01/21/2025    Depression Monitoring  10/10/2025    Breast cancer screen  06/20/2026    Diabetes screen  10/10/2026    Cervical cancer screen  02/24/2027    Shingles vaccine  Completed    COVID-19 Vaccine  Completed    Hepatitis A vaccine  Aged Out    Hib vaccine  Aged Out    Polio vaccine  Aged Out    Meningococcal (ACWY) vaccine  Aged Out             (applicable per patient's age: Cancer Screenings, Depression Screening, Fall Risk Screening, Immunizations)    Hemoglobin A1C (%)   Date Value   10/10/2023 5.1     AST (U/L)   Date Value   08/13/2024 32 (H)     ALT (U/L)   Date Value   08/13/2024 26     BUN (mg/dL)   Date Value   08/13/2024 10      (goal A1C is < 7)   (goal LDL is <100) need 30-50% reduction from baseline     BP Readings from Last 3 Encounters:   10/10/24 126/64   08/13/24 112/74   08/11/24 (!) 101/54    (goal /80)      All Future Testing planned in [facility]:  Lab Frequency Next Occurrence   Iron and TIBC Once 06/11/2024   Vitamin B12 & Folate Once 06/11/2024   Ferritin Once 06/11/2024   US NON OB TRANSVAGINAL Once 06/25/2024       Next Visit Date:  Future Appointments   Date Time Provider Department Center   11/13/2024  1:40 PM Maddi Starks, APRN - CNP [facility]            Patient Active Problem List:     Bipolar 1 disorder (HCC)     Chest pain in adult     Hypertension     Depression with suicidal ideation     Acute pyelonephritis     Status post cystoscopy with ureteral stent placement     COPD with acute exacerbation (HCC)

## 2024-10-30 ENCOUNTER — TELEPHONE (OUTPATIENT)
Dept: PRIMARY CARE CLINIC | Age: 59
End: 2024-10-30

## 2024-10-30 RX ORDER — LORAZEPAM 1 MG/1
1 TABLET ORAL 2 TIMES DAILY
Qty: 120 TABLET | Refills: 0 | Status: SHIPPED | OUTPATIENT
Start: 2024-10-30 | End: 2024-12-29

## 2024-10-30 NOTE — TELEPHONE ENCOUNTER
----- Message from AIMEE Pederson CNP sent at 10/30/2024  8:49 AM EDT -----  Please notify patient of normal Holter monitor results.  Thanks Mdadi

## 2024-11-13 ENCOUNTER — OFFICE VISIT (OUTPATIENT)
Dept: PRIMARY CARE CLINIC | Age: 59
End: 2024-11-13

## 2024-11-13 VITALS
BODY MASS INDEX: 39.58 KG/M2 | WEIGHT: 230.6 LBS | SYSTOLIC BLOOD PRESSURE: 112 MMHG | HEART RATE: 76 BPM | DIASTOLIC BLOOD PRESSURE: 84 MMHG | TEMPERATURE: 99 F | RESPIRATION RATE: 16 BRPM | OXYGEN SATURATION: 96 %

## 2024-11-13 DIAGNOSIS — M79.604 BILATERAL LEG PAIN: ICD-10-CM

## 2024-11-13 DIAGNOSIS — Z80.0 FAMILY HISTORY OF COLON CANCER: ICD-10-CM

## 2024-11-13 DIAGNOSIS — R10.84 GENERALIZED ABDOMINAL PAIN: ICD-10-CM

## 2024-11-13 DIAGNOSIS — M54.42 CHRONIC MIDLINE LOW BACK PAIN WITH BILATERAL SCIATICA: ICD-10-CM

## 2024-11-13 DIAGNOSIS — K64.9 HEMORRHOIDS, UNSPECIFIED HEMORRHOID TYPE: ICD-10-CM

## 2024-11-13 DIAGNOSIS — M79.7 FIBROMYALGIA: ICD-10-CM

## 2024-11-13 DIAGNOSIS — F31.9 BIPOLAR 1 DISORDER (HCC): ICD-10-CM

## 2024-11-13 DIAGNOSIS — I10 PRIMARY HYPERTENSION: Primary | ICD-10-CM

## 2024-11-13 DIAGNOSIS — G89.29 CHRONIC MIDLINE LOW BACK PAIN WITH BILATERAL SCIATICA: ICD-10-CM

## 2024-11-13 DIAGNOSIS — M25.551 BILATERAL HIP PAIN: ICD-10-CM

## 2024-11-13 DIAGNOSIS — M79.605 BILATERAL LEG PAIN: ICD-10-CM

## 2024-11-13 DIAGNOSIS — M54.41 CHRONIC MIDLINE LOW BACK PAIN WITH BILATERAL SCIATICA: ICD-10-CM

## 2024-11-13 DIAGNOSIS — Z12.11 COLON CANCER SCREENING: ICD-10-CM

## 2024-11-13 DIAGNOSIS — M25.552 BILATERAL HIP PAIN: ICD-10-CM

## 2024-11-13 RX ORDER — MORPHINE SULFATE 15 MG/1
TABLET, FILM COATED, EXTENDED RELEASE ORAL
COMMUNITY
Start: 2024-11-07

## 2024-11-13 ASSESSMENT — PATIENT HEALTH QUESTIONNAIRE - PHQ9
SUM OF ALL RESPONSES TO PHQ QUESTIONS 1-9: 17
8. MOVING OR SPEAKING SO SLOWLY THAT OTHER PEOPLE COULD HAVE NOTICED. OR THE OPPOSITE, BEING SO FIGETY OR RESTLESS THAT YOU HAVE BEEN MOVING AROUND A LOT MORE THAN USUAL: NOT AT ALL
SUM OF ALL RESPONSES TO PHQ QUESTIONS 1-9: 17
7. TROUBLE CONCENTRATING ON THINGS, SUCH AS READING THE NEWSPAPER OR WATCHING TELEVISION: NEARLY EVERY DAY
SUM OF ALL RESPONSES TO PHQ QUESTIONS 1-9: 17
2. FEELING DOWN, DEPRESSED OR HOPELESS: NEARLY EVERY DAY
9. THOUGHTS THAT YOU WOULD BE BETTER OFF DEAD, OR OF HURTING YOURSELF: NOT AT ALL
6. FEELING BAD ABOUT YOURSELF - OR THAT YOU ARE A FAILURE OR HAVE LET YOURSELF OR YOUR FAMILY DOWN: NEARLY EVERY DAY
5. POOR APPETITE OR OVEREATING: MORE THAN HALF THE DAYS
SUM OF ALL RESPONSES TO PHQ9 QUESTIONS 1 & 2: 5
1. LITTLE INTEREST OR PLEASURE IN DOING THINGS: MORE THAN HALF THE DAYS
SUM OF ALL RESPONSES TO PHQ QUESTIONS 1-9: 17
3. TROUBLE FALLING OR STAYING ASLEEP: NEARLY EVERY DAY
10. IF YOU CHECKED OFF ANY PROBLEMS, HOW DIFFICULT HAVE THESE PROBLEMS MADE IT FOR YOU TO DO YOUR WORK, TAKE CARE OF THINGS AT HOME, OR GET ALONG WITH OTHER PEOPLE: EXTREMELY DIFFICULT
4. FEELING TIRED OR HAVING LITTLE ENERGY: SEVERAL DAYS

## 2024-11-13 ASSESSMENT — ENCOUNTER SYMPTOMS
ABDOMINAL PAIN: 1
ALLERGIC/IMMUNOLOGIC NEGATIVE: 1
EYES NEGATIVE: 1
RECTAL PAIN: 1
BLOOD IN STOOL: 1
BACK PAIN: 1
RESPIRATORY NEGATIVE: 1

## 2024-11-13 NOTE — PROGRESS NOTES
nothing else that can be done for her back.  She has a history of previous spinal fusions, fibromyalgia, and bilateral hip pain.  She states the hip pain she was told is coming from her spine.  Palliative care has recommended she get a second opinion on her pain management since she is not getting relief with current POC.    Past Medical History:     Past Medical History:   Diagnosis Date    Anxiety     Arthritis     Asthma     Bipolar disorder (HCC)     Chronic back pain     COPD (chronic obstructive pulmonary disease) ([facility])     Depression     Eczema of face     Fibromyalgia     Genital warts     GERD (gastroesophageal reflux disease)     Hypertension     Neuropathy     Obesity     Osteoarthritis     Psychiatric problem     Restless legs     Sleep apnea     Stroke ([facility])       Reviewed all health maintenance requirements and ordered appropriate tests  Health Maintenance Due   Topic Date Due    Colorectal Cancer Screen  Never done    Pneumococcal 0-64 years Vaccine (2 of 2 - PPSV23 or PCV20) 2020       Past Surgical History:     Past Surgical History:   Procedure Laterality Date    APPENDECTOMY      BACK SURGERY       SECTION      CHOLECYSTECTOMY      HERNIA REPAIR      KIDNEY SURGERY Right     LUMBAR FUSION      TONSILLECTOMY      TOTAL KNEE ARTHROPLASTY Left         Medications:       Prior to Admission medications    Medication Sig Start Date End Date Taking? Authorizing Provider   morphine (MS CONTIN) 15 MG extended release tablet  24  Yes Provider, MD Erick   LORazepam (ATIVAN) 1 MG tablet Take 1 tablet by mouth in the morning and 1 tablet in the evening. Do all this for 60 days. Max Daily Amount: 2 mg. 10/30/24 12/29/24 Yes Maddi Starks APRN - CNP   pantoprazole (PROTONIX) 40 MG tablet Take 1 tablet by mouth daily 10/21/24  Yes Maddi Starks APRN - CNP   folic acid (FOLVITE) 1 MG tablet Take 1 tablet by mouth daily 10/16/24  Yes Maddi Starks APRN - CNP   rOPINIRole

## 2024-11-13 NOTE — PATIENT INSTRUCTIONS
SURVEY:     You may be receiving a survey from [facility] [employer] regarding your visit today.     Please complete the survey to enable us to provide the highest quality of care to you and your family.     If you cannot score us a very good on any question, please call the office to discuss how we could have made your experience a very good one.     Thank you,    Melchor Schneider, APRN-CNP  Maddi Starks, APRN-CNP  Hermelinda, LPN  Kirsten, CMA  Silvano, CMA  Linda, CMA  Leslie, PCA  Consuelo, CMA  Sonia, PM

## 2024-12-11 ENCOUNTER — OFFICE VISIT (OUTPATIENT)
Dept: PRIMARY CARE CLINIC | Age: 59
End: 2024-12-11
Payer: COMMERCIAL

## 2024-12-11 VITALS
OXYGEN SATURATION: 98 % | DIASTOLIC BLOOD PRESSURE: 82 MMHG | RESPIRATION RATE: 16 BRPM | HEART RATE: 94 BPM | TEMPERATURE: 99.7 F | SYSTOLIC BLOOD PRESSURE: 124 MMHG | BODY MASS INDEX: 40.34 KG/M2 | WEIGHT: 235 LBS

## 2024-12-11 DIAGNOSIS — U07.1 COVID: Primary | ICD-10-CM

## 2024-12-11 DIAGNOSIS — R51.9 NONINTRACTABLE HEADACHE, UNSPECIFIED CHRONICITY PATTERN, UNSPECIFIED HEADACHE TYPE: ICD-10-CM

## 2024-12-11 DIAGNOSIS — R05.9 COUGH, UNSPECIFIED TYPE: ICD-10-CM

## 2024-12-11 DIAGNOSIS — H66.012 NON-RECURRENT ACUTE SUPPURATIVE OTITIS MEDIA OF LEFT EAR WITH SPONTANEOUS RUPTURE OF TYMPANIC MEMBRANE: ICD-10-CM

## 2024-12-11 DIAGNOSIS — R50.9 FEVER, UNSPECIFIED FEVER CAUSE: ICD-10-CM

## 2024-12-11 LAB
INFLUENZA A ANTIGEN, POC: NEGATIVE
INFLUENZA B ANTIGEN, POC: NEGATIVE
LOT NUMBER POC: NORMAL
SARS-COV-2 RNA POC - COV: DETECTED
VALID INTERNAL CONTROL, POC: PRESENT
VENDOR AND KIT NAME POC: NORMAL

## 2024-12-11 PROCEDURE — 3017F COLORECTAL CA SCREEN DOC REV: CPT | Performed by: NURSE PRACTITIONER

## 2024-12-11 PROCEDURE — G8427 DOCREV CUR MEDS BY ELIG CLIN: HCPCS | Performed by: NURSE PRACTITIONER

## 2024-12-11 PROCEDURE — 99214 OFFICE O/P EST MOD 30 MIN: CPT | Performed by: NURSE PRACTITIONER

## 2024-12-11 PROCEDURE — 1036F TOBACCO NON-USER: CPT | Performed by: NURSE PRACTITIONER

## 2024-12-11 PROCEDURE — 3079F DIAST BP 80-89 MM HG: CPT | Performed by: NURSE PRACTITIONER

## 2024-12-11 PROCEDURE — 3074F SYST BP LT 130 MM HG: CPT | Performed by: NURSE PRACTITIONER

## 2024-12-11 PROCEDURE — G8484 FLU IMMUNIZE NO ADMIN: HCPCS | Performed by: NURSE PRACTITIONER

## 2024-12-11 PROCEDURE — G8417 CALC BMI ABV UP PARAM F/U: HCPCS | Performed by: NURSE PRACTITIONER

## 2024-12-11 RX ORDER — GUAIFENESIN 600 MG/1
600 TABLET, EXTENDED RELEASE ORAL 2 TIMES DAILY
Qty: 30 TABLET | Refills: 0 | Status: SHIPPED | OUTPATIENT
Start: 2024-12-11 | End: 2024-12-26

## 2024-12-11 RX ORDER — AZITHROMYCIN 250 MG/1
TABLET, FILM COATED ORAL
Qty: 6 TABLET | Refills: 0 | Status: SHIPPED | OUTPATIENT
Start: 2024-12-11 | End: 2024-12-21

## 2024-12-11 ASSESSMENT — PATIENT HEALTH QUESTIONNAIRE - PHQ9
7. TROUBLE CONCENTRATING ON THINGS, SUCH AS READING THE NEWSPAPER OR WATCHING TELEVISION: NOT AT ALL
10. IF YOU CHECKED OFF ANY PROBLEMS, HOW DIFFICULT HAVE THESE PROBLEMS MADE IT FOR YOU TO DO YOUR WORK, TAKE CARE OF THINGS AT HOME, OR GET ALONG WITH OTHER PEOPLE: NOT DIFFICULT AT ALL
9. THOUGHTS THAT YOU WOULD BE BETTER OFF DEAD, OR OF HURTING YOURSELF: NOT AT ALL
SUM OF ALL RESPONSES TO PHQ QUESTIONS 1-9: 0
1. LITTLE INTEREST OR PLEASURE IN DOING THINGS: NOT AT ALL
SUM OF ALL RESPONSES TO PHQ QUESTIONS 1-9: 0
5. POOR APPETITE OR OVEREATING: NOT AT ALL
SUM OF ALL RESPONSES TO PHQ QUESTIONS 1-9: 0
6. FEELING BAD ABOUT YOURSELF - OR THAT YOU ARE A FAILURE OR HAVE LET YOURSELF OR YOUR FAMILY DOWN: NOT AT ALL
8. MOVING OR SPEAKING SO SLOWLY THAT OTHER PEOPLE COULD HAVE NOTICED. OR THE OPPOSITE, BEING SO FIGETY OR RESTLESS THAT YOU HAVE BEEN MOVING AROUND A LOT MORE THAN USUAL: NOT AT ALL
SUM OF ALL RESPONSES TO PHQ QUESTIONS 1-9: 0
2. FEELING DOWN, DEPRESSED OR HOPELESS: NOT AT ALL
4. FEELING TIRED OR HAVING LITTLE ENERGY: NOT AT ALL
3. TROUBLE FALLING OR STAYING ASLEEP: NOT AT ALL
SUM OF ALL RESPONSES TO PHQ9 QUESTIONS 1 & 2: 0

## 2024-12-11 ASSESSMENT — ENCOUNTER SYMPTOMS
RHINORRHEA: 1
SHORTNESS OF BREATH: 1
SORE THROAT: 1
COUGH: 1
GASTROINTESTINAL NEGATIVE: 1
ALLERGIC/IMMUNOLOGIC NEGATIVE: 1
WHEEZING: 0

## 2024-12-11 NOTE — PATIENT INSTRUCTIONS
SURVEY:     You may be receiving a survey from [facility] regarding your visit today.     Please complete the survey to enable us to provide the highest quality of care to you and your family.     If you cannot score us a very good on any question, please call the office to discuss how we could have made your experience a very good one.     Thank you,    Melchor Schneider, APRN-CNP  Maddi Starks, APRN-CNP  Hermelinda, LPN  Kirsten, CMA  Silvano, CMA  Linda, CMA  Leslie, PCA  Consuelo, CMA  Sonia, PM

## 2025-01-20 RX ORDER — SENNOSIDES A AND B 8.6 MG/1
1 TABLET, FILM COATED ORAL DAILY
Qty: 90 TABLET | Refills: 0 | Status: SHIPPED | OUTPATIENT
Start: 2025-01-20

## 2025-01-20 NOTE — TELEPHONE ENCOUNTER
Health Maintenance   Topic Date Due    Colorectal Cancer Screen  Never done    Pneumococcal 0-64 years Vaccine (2 of 2 - PPSV23 or PCV20) 12/29/2020    Lipids  01/21/2025    DTaP/Tdap/Td vaccine (1 - Tdap) 04/03/2025 (Originally 7/10/2019)    Hepatitis C screen  04/03/2025 (Originally 9/7/1984)    HIV screen  04/03/2025 (Originally 9/7/1981)    Hepatitis B vaccine (1 of 3 - 19+ 3-dose series) 10/10/2025 (Originally 9/7/1985)    Depression Monitoring  12/11/2025    Breast cancer screen  06/20/2026    Diabetes screen  10/10/2026    Cervical cancer screen  02/24/2027    Flu vaccine  Completed    Shingles vaccine  Completed    COVID-19 Vaccine  Completed    Hepatitis A vaccine  Aged Out    Hib vaccine  Aged Out    Polio vaccine  Aged Out    Meningococcal (ACWY) vaccine  Aged Out             (applicable per patient's age: Cancer Screenings, Depression Screening, Fall Risk Screening, Immunizations)    Hemoglobin A1C (%)   Date Value   10/10/2023 5.1     AST (U/L)   Date Value   08/13/2024 32 (H)     ALT (U/L)   Date Value   08/13/2024 26     BUN (mg/dL)   Date Value   08/13/2024 10      (goal A1C is < 7)   (goal LDL is <100) need 30-50% reduction from baseline     BP Readings from Last 3 Encounters:   12/11/24 124/82   11/13/24 112/84   10/10/24 126/64    (goal /80)      All Future Testing planned in [facility]:  Lab Frequency Next Occurrence   US NON OB TRANSVAGINAL Once 06/25/2024   CT ABDOMEN PELVIS WO CONTRAST Additional Contrast? Radiologist Recommendation Once 11/13/2024       Next Visit Date:  Future Appointments   Date Time Provider Department Center   2/12/2025 11:30 AM Pankaj Rivera DO [facility]   2/17/2025  1:40 PM Maddi Starks, APRN - CNP [facility]            Patient Active Problem List:     Bipolar 1 disorder (HCC)     Chest pain in adult     Hypertension     Depression with suicidal ideation     Acute pyelonephritis     Status post cystoscopy with ureteral stent

## 2025-02-12 ENCOUNTER — TELEPHONE (OUTPATIENT)
Age: 60
End: 2025-02-12

## 2025-02-12 NOTE — TELEPHONE ENCOUNTER
Patient no call/no show to first appointment with Dr Rivera for pain management. Patient will not be rescheduled. If patient needs pain management, they will need to be referred to another facility.

## 2025-02-15 SDOH — ECONOMIC STABILITY: TRANSPORTATION INSECURITY
IN THE PAST 12 MONTHS, HAS LACK OF TRANSPORTATION KEPT YOU FROM MEETINGS, WORK, OR FROM GETTING THINGS NEEDED FOR DAILY LIVING?: NO

## 2025-02-15 SDOH — ECONOMIC STABILITY: FOOD INSECURITY: WITHIN THE PAST 12 MONTHS, THE FOOD YOU BOUGHT JUST DIDN'T LAST AND YOU DIDN'T HAVE MONEY TO GET MORE.: SOMETIMES TRUE

## 2025-02-15 SDOH — ECONOMIC STABILITY: INCOME INSECURITY: IN THE LAST 12 MONTHS, WAS THERE A TIME WHEN YOU WERE NOT ABLE TO PAY THE MORTGAGE OR RENT ON TIME?: YES

## 2025-02-15 SDOH — ECONOMIC STABILITY: TRANSPORTATION INSECURITY
IN THE PAST 12 MONTHS, HAS THE LACK OF TRANSPORTATION KEPT YOU FROM MEDICAL APPOINTMENTS OR FROM GETTING MEDICATIONS?: NO

## 2025-02-15 SDOH — ECONOMIC STABILITY: FOOD INSECURITY: WITHIN THE PAST 12 MONTHS, YOU WORRIED THAT YOUR FOOD WOULD RUN OUT BEFORE YOU GOT MONEY TO BUY MORE.: OFTEN TRUE

## 2025-02-15 ASSESSMENT — PATIENT HEALTH QUESTIONNAIRE - PHQ9
8. MOVING OR SPEAKING SO SLOWLY THAT OTHER PEOPLE COULD HAVE NOTICED. OR THE OPPOSITE, BEING SO FIGETY OR RESTLESS THAT YOU HAVE BEEN MOVING AROUND A LOT MORE THAN USUAL: NOT AT ALL
SUM OF ALL RESPONSES TO PHQ QUESTIONS 1-9: 10
10. IF YOU CHECKED OFF ANY PROBLEMS, HOW DIFFICULT HAVE THESE PROBLEMS MADE IT FOR YOU TO DO YOUR WORK, TAKE CARE OF THINGS AT HOME, OR GET ALONG WITH OTHER PEOPLE: SOMEWHAT DIFFICULT
7. TROUBLE CONCENTRATING ON THINGS, SUCH AS READING THE NEWSPAPER OR WATCHING TELEVISION: MORE THAN HALF THE DAYS
2. FEELING DOWN, DEPRESSED OR HOPELESS: SEVERAL DAYS
9. THOUGHTS THAT YOU WOULD BE BETTER OFF DEAD, OR OF HURTING YOURSELF: NOT AT ALL
1. LITTLE INTEREST OR PLEASURE IN DOING THINGS: NOT AT ALL
4. FEELING TIRED OR HAVING LITTLE ENERGY: MORE THAN HALF THE DAYS
5. POOR APPETITE OR OVEREATING: SEVERAL DAYS
6. FEELING BAD ABOUT YOURSELF - OR THAT YOU ARE A FAILURE OR HAVE LET YOURSELF OR YOUR FAMILY DOWN: SEVERAL DAYS
2. FEELING DOWN, DEPRESSED OR HOPELESS: SEVERAL DAYS
8. MOVING OR SPEAKING SO SLOWLY THAT OTHER PEOPLE COULD HAVE NOTICED. OR THE OPPOSITE - BEING SO FIDGETY OR RESTLESS THAT YOU HAVE BEEN MOVING AROUND A LOT MORE THAN USUAL: NOT AT ALL
SUM OF ALL RESPONSES TO PHQ QUESTIONS 1-9: 10
3. TROUBLE FALLING OR STAYING ASLEEP: NEARLY EVERY DAY
5. POOR APPETITE OR OVEREATING: SEVERAL DAYS
SUM OF ALL RESPONSES TO PHQ QUESTIONS 1-9: 10
3. TROUBLE FALLING OR STAYING ASLEEP: NEARLY EVERY DAY
1. LITTLE INTEREST OR PLEASURE IN DOING THINGS: NOT AT ALL
SUM OF ALL RESPONSES TO PHQ9 QUESTIONS 1 & 2: 1
SUM OF ALL RESPONSES TO PHQ QUESTIONS 1-9: 10
7. TROUBLE CONCENTRATING ON THINGS, SUCH AS READING THE NEWSPAPER OR WATCHING TELEVISION: MORE THAN HALF THE DAYS
9. THOUGHTS THAT YOU WOULD BE BETTER OFF DEAD, OR OF HURTING YOURSELF: NOT AT ALL
10. IF YOU CHECKED OFF ANY PROBLEMS, HOW DIFFICULT HAVE THESE PROBLEMS MADE IT FOR YOU TO DO YOUR WORK, TAKE CARE OF THINGS AT HOME, OR GET ALONG WITH OTHER PEOPLE: SOMEWHAT DIFFICULT
6. FEELING BAD ABOUT YOURSELF - OR THAT YOU ARE A FAILURE OR HAVE LET YOURSELF OR YOUR FAMILY DOWN: SEVERAL DAYS
4. FEELING TIRED OR HAVING LITTLE ENERGY: MORE THAN HALF THE DAYS
SUM OF ALL RESPONSES TO PHQ QUESTIONS 1-9: 10

## 2025-02-17 ENCOUNTER — OFFICE VISIT (OUTPATIENT)
Dept: PRIMARY CARE CLINIC | Age: 60
End: 2025-02-17
Payer: COMMERCIAL

## 2025-02-17 VITALS
HEART RATE: 81 BPM | RESPIRATION RATE: 16 BRPM | WEIGHT: 234.6 LBS | SYSTOLIC BLOOD PRESSURE: 104 MMHG | OXYGEN SATURATION: 98 % | TEMPERATURE: 98.4 F | BODY MASS INDEX: 40.27 KG/M2 | DIASTOLIC BLOOD PRESSURE: 66 MMHG

## 2025-02-17 DIAGNOSIS — F31.9 BIPOLAR 1 DISORDER (HCC): ICD-10-CM

## 2025-02-17 DIAGNOSIS — Z13.1 DIABETES MELLITUS SCREENING: ICD-10-CM

## 2025-02-17 DIAGNOSIS — I10 PRIMARY HYPERTENSION: ICD-10-CM

## 2025-02-17 DIAGNOSIS — D52.9 ANEMIA DUE TO FOLIC ACID DEFICIENCY, UNSPECIFIED DEFICIENCY TYPE: ICD-10-CM

## 2025-02-17 DIAGNOSIS — Z13.220 LIPID SCREENING: ICD-10-CM

## 2025-02-17 DIAGNOSIS — M25.561 RIGHT ANTERIOR KNEE PAIN: Primary | ICD-10-CM

## 2025-02-17 PROBLEM — M79.673 HEEL PAIN: Status: ACTIVE | Noted: 2017-07-19

## 2025-02-17 PROBLEM — N95.1 MENOPAUSAL SYMPTOM: Status: ACTIVE | Noted: 2020-10-15

## 2025-02-17 PROBLEM — J44.1 COPD WITH ACUTE EXACERBATION (HCC): Status: RESOLVED | Noted: 2024-10-10 | Resolved: 2025-02-17

## 2025-02-17 PROBLEM — G45.9 TRANSIENT ISCHEMIC ATTACK: Status: ACTIVE | Noted: 2017-05-12

## 2025-02-17 PROCEDURE — 3017F COLORECTAL CA SCREEN DOC REV: CPT | Performed by: NURSE PRACTITIONER

## 2025-02-17 PROCEDURE — 3074F SYST BP LT 130 MM HG: CPT | Performed by: NURSE PRACTITIONER

## 2025-02-17 PROCEDURE — 3078F DIAST BP <80 MM HG: CPT | Performed by: NURSE PRACTITIONER

## 2025-02-17 PROCEDURE — 99214 OFFICE O/P EST MOD 30 MIN: CPT | Performed by: NURSE PRACTITIONER

## 2025-02-17 PROCEDURE — 1036F TOBACCO NON-USER: CPT | Performed by: NURSE PRACTITIONER

## 2025-02-17 PROCEDURE — G8417 CALC BMI ABV UP PARAM F/U: HCPCS | Performed by: NURSE PRACTITIONER

## 2025-02-17 PROCEDURE — G8427 DOCREV CUR MEDS BY ELIG CLIN: HCPCS | Performed by: NURSE PRACTITIONER

## 2025-02-17 RX ORDER — CELECOXIB 100 MG/1
100 CAPSULE ORAL 2 TIMES DAILY
Qty: 60 CAPSULE | Refills: 0 | Status: SHIPPED | OUTPATIENT
Start: 2025-02-17

## 2025-02-17 RX ORDER — LORAZEPAM 1 MG/1
1 TABLET ORAL 2 TIMES DAILY
COMMUNITY
End: 2025-02-17 | Stop reason: SDUPTHER

## 2025-02-17 RX ORDER — LORAZEPAM 1 MG/1
1 TABLET ORAL 2 TIMES DAILY
Qty: 60 TABLET | Refills: 0 | Status: SHIPPED | OUTPATIENT
Start: 2025-02-17 | End: 2025-03-19

## 2025-02-17 ASSESSMENT — ENCOUNTER SYMPTOMS
GASTROINTESTINAL NEGATIVE: 1
ALLERGIC/IMMUNOLOGIC NEGATIVE: 1
RESPIRATORY NEGATIVE: 1
EYES NEGATIVE: 1

## 2025-02-17 NOTE — PATIENT INSTRUCTIONS
SURVEY:     You may be receiving a survey from [facility] regarding your visit today.     Please complete the survey to enable us to provide the highest quality of care to you and your family.     If you cannot score us a very good on any question, please call the office to discuss how we could have made your experience a very good one.     Thank you,    Melchor Schneider, APRN-CNP  Maddi Starks, APRN-CNP  Hermelinda, LPN  Kirsten, CMA  Silvano, CMA  Linda, CMA  Leslie, PCA  Consuelo, CMA  Kristiney, PM      [facility] Food Resources*  (Call [facility] for more resources)    [facility]  What they offer: Food pantry second and fourth Tuesday 4:30-6pm  Phone Number and Address: [phone] Toll Free: [employer] at [employer], between [place] and [employer]; [address] Office on Aging of [facility]  What they offer: “Meals & Nutrition” search option on website  Phone Number and Website: 724.417.9690; https://Mobile Authentication/  [facility] [facility] [employer]  What they offer: Dinner is open to all (must be registered and in good standing) and three hot meals a day for shelter residents. Breakfast 7-8am, Lunch 12-1pm, and Dinner 5-6pm daily.  Phone and Address: [phone]; [address] [facility] [address]  [employer] Last [employer] Delivery program  What they offer: Food Box Delivery for those within [facility] who are homebound or without transportation. Applications done by phone. Qualifying individuals are eligible for 3 deliveries for the lifetime of the program.  Phone number: Call for eligibility check at 2-1-1 or 8-422-868-UPPY (2104)  [facility]  What they offer: Food Pantry second and fourth Saturday 1-5pm  Phone and Address: [phone]; [address] [facility] [address]  [employer] Mobile Food Pantries   What they offer: Mobile pantries throughout the [facility]. Anyone in need may

## 2025-02-17 NOTE — PROGRESS NOTES
P [facility]  CARMEN WALL CNP  [facility] PRIMARY CARE  [address] [place] [id number]  Dept: [phone]  Dept Fax: [phone]      Name: Tanisha Coleman  : 1965         Chief Complaint:     Chief Complaint   Patient presents with    Hypertension     Patient woul dlike this to be her yearly for insurance.     Knee Pain     Patient c/o right knee pain. X 1 month. Patient denies injury.        History of Present Illness:      Tanisha Coleman is a 59 y.o.  female who presents with Hypertension (Patient woul dlike this to be her yearly for insurance. ) and Knee Pain (Patient c/o right knee pain. X 1 month. Patient denies injury. )      DANIELLE Garay is here today for a routine office visit.  She has been having right knee pain on the right side that has increased over the last month.  She will get relief with OTC arthritis cream.  She has increased pain in the knee with activity.  She is also taking an OTC joint supplement.  She will feel the knee stick like it wants to go out on her.  The pain is mainly on the outside of the knee.  She has been wanting to avoid seeing an orthopedic doctor.  She has a history of having a total knee replacement on the left side.  She is on long term pain medications that she states does not help her pain.    Depression screening positive.  Tanisha denies any worsening depression symptoms.  Her depression is mainly related to her pain and her health.  She denies any worsening symptoms or suicidal ideas.  She denies a need to increase her medication at this time.    Palliative care has previously wrote a letter for BRIAN for her to have a caregiver.  She forgot to ask for a letter the last time she was seen and is requesting a letter today that she continue to get a caregiver that helps her with her ADL's and house keeping.      Past Medical History:     Past Medical History:   Diagnosis Date    Anxiety     Arthritis     Asthma     Bipolar disorder (HCC)

## 2025-03-10 DIAGNOSIS — G25.81 RLS (RESTLESS LEGS SYNDROME): ICD-10-CM

## 2025-03-10 RX ORDER — ROPINIROLE 2 MG/1
TABLET, FILM COATED ORAL
Qty: 180 TABLET | Refills: 0 | Status: SHIPPED | OUTPATIENT
Start: 2025-03-10

## 2025-03-10 NOTE — TELEPHONE ENCOUNTER
Health Maintenance   Topic Date Due    Colorectal Cancer Screen  Never done    Pneumococcal 50+ years Vaccine (2 of 2 - PPSV23) 12/29/2020    Lipids  01/21/2025    DTaP/Tdap/Td vaccine (1 - Tdap) 04/03/2025 (Originally 7/10/2019)    Hepatitis C screen  04/03/2025 (Originally 9/7/1984)    HIV screen  04/03/2025 (Originally 9/7/1981)    Hepatitis B vaccine (1 of 3 - 19+ 3-dose series) 10/10/2025 (Originally 9/7/1985)    Depression Monitoring  02/15/2026    Breast cancer screen  06/20/2026    Diabetes screen  10/10/2026    Cervical cancer screen  02/24/2027    Flu vaccine  Completed    Shingles vaccine  Completed    COVID-19 Vaccine  Completed    Hepatitis A vaccine  Aged Out    Hib vaccine  Aged Out    Polio vaccine  Aged Out    Meningococcal (ACWY) vaccine  Aged Out    Meningococcal B vaccine  Aged Out    Pneumococcal 0-49 years Vaccine  Discontinued             (applicable per patient's age: Cancer Screenings, Depression Screening, Fall Risk Screening, Immunizations)    Hemoglobin A1C (%)   Date Value   10/10/2023 5.1     AST (U/L)   Date Value   08/13/2024 32 (H)     ALT (U/L)   Date Value   08/13/2024 26     BUN (mg/dL)   Date Value   08/13/2024 10      (goal A1C is < 7)   (goal LDL is <100) need 30-50% reduction from baseline     BP Readings from Last 3 Encounters:   02/17/25 104/66   12/11/24 124/82   11/13/24 112/84    (goal /80)      All Future Testing planned in [facility]:  Lab Frequency Next Occurrence   US NON OB TRANSVAGINAL Once 06/25/2024   CT ABDOMEN PELVIS WO CONTRAST Additional Contrast? Radiologist Recommendation Once 11/13/2024   XR KNEE RIGHT (3 VIEWS) Once 02/17/2025   Lipid Panel Once 05/17/2025   Hemoglobin A1C Once 05/17/2025   Comprehensive Metabolic Panel, Fasting Once 05/17/2025   CBC with Auto Differential Once 05/17/2025   Iron and TIBC Once 05/17/2025       Next Visit Date:  Future Appointments   Date Time Provider Department Center   5/19/2025 11:00 AM Maddi Starks, APRN - CNP

## 2025-03-12 ENCOUNTER — TELEPHONE (OUTPATIENT)
Dept: PRIMARY CARE CLINIC | Age: 60
End: 2025-03-12

## 2025-03-12 ENCOUNTER — HOSPITAL ENCOUNTER (EMERGENCY)
Age: 60
Discharge: HOME | End: 2025-03-12
Attending: EMERGENCY MEDICINE
Payer: COMMERCIAL

## 2025-03-12 VITALS
RESPIRATION RATE: 16 BRPM | TEMPERATURE: 97.9 F | SYSTOLIC BLOOD PRESSURE: 114 MMHG | OXYGEN SATURATION: 96 % | HEART RATE: 79 BPM | DIASTOLIC BLOOD PRESSURE: 91 MMHG

## 2025-03-12 DIAGNOSIS — G89.29 OTHER CHRONIC PAIN: Primary | ICD-10-CM

## 2025-03-12 PROCEDURE — 96372 THER/PROPH/DIAG INJ SC/IM: CPT

## 2025-03-12 PROCEDURE — 99284 EMERGENCY DEPT VISIT MOD MDM: CPT

## 2025-03-12 PROCEDURE — 6360000002 HC RX W HCPCS: Performed by: EMERGENCY MEDICINE

## 2025-03-12 RX ORDER — HYDROMORPHONE HYDROCHLORIDE 1 MG/ML
0.25 INJECTION, SOLUTION INTRAMUSCULAR; INTRAVENOUS; SUBCUTANEOUS ONCE
Status: COMPLETED | OUTPATIENT
Start: 2025-03-12 | End: 2025-03-12

## 2025-03-12 RX ADMIN — HYDROMORPHONE HYDROCHLORIDE 0.25 MG: 1 INJECTION, SOLUTION INTRAMUSCULAR; INTRAVENOUS; SUBCUTANEOUS at 03:16

## 2025-03-12 ASSESSMENT — ENCOUNTER SYMPTOMS: BACK PAIN: 1

## 2025-03-12 NOTE — ED PROVIDER NOTES
[facility] EMERGENCY DEPARTMENT  EMERGENCY DEPARTMENT ENCOUNTER      Pt Name: Tanisha Coleman  MRN: [id number]  Birthdate 1965  Date of evaluation: 3/12/2025  Provider: Adonis Johnston MD    CHIEF COMPLAINT       Chief Complaint   Patient presents with    Leg Pain     Bilateral leg pain that goes into feet per patient.          HISTORY OF PRESENT ILLNESS   (Location/Symptom, Timing/Onset, Context/Setting, Quality, Duration, Modifying Factors, Severity)  Note limiting factors.   Tanisha Coleman is a 59 y.o. female who presents to the emergency department   the patient presents to the emergency room stating that she is having a flareup of her chronic pain syndrome and restless leg syndrome.  She currently takes MS Contin, Percocet, and Requip for the symptoms.  There have been no new injuries.  No incontinence no paresthesias no trauma    HPI    Nursing Notes were reviewed.    REVIEW OF SYSTEMS    (2-9 systems for level 4, 10 or more for level 5)     Review of Systems   Musculoskeletal:  Positive for back pain and myalgias.   All other systems reviewed and are negative.      Except as noted above the remainder of the review of systems was reviewed and negative.       PAST MEDICAL HISTORY     Past Medical History:   Diagnosis Date    Anxiety     Arthritis     Asthma     Bipolar disorder (HCC)     Chronic back pain     COPD (chronic obstructive pulmonary disease) (HCC)     Depression     Eczema of face     Fibromyalgia     Genital warts     GERD (gastroesophageal reflux disease)     Hypertension     Neuropathy     Obesity     Osteoarthritis     Psychiatric problem     Restless legs     Sleep apnea     Stroke (HCC)          SURGICAL HISTORY       Past Surgical History:   Procedure Laterality Date    APPENDECTOMY      BACK SURGERY       SECTION      CHOLECYSTECTOMY      HERNIA REPAIR      KIDNEY SURGERY Right     LUMBAR FUSION      TONSILLECTOMY      TOTAL KNEE ARTHROPLASTY Left          CURRENT MEDICATIONS

## 2025-03-21 ENCOUNTER — OFFICE VISIT (OUTPATIENT)
Dept: PRIMARY CARE CLINIC | Age: 60
End: 2025-03-21
Payer: COMMERCIAL

## 2025-03-21 VITALS
TEMPERATURE: 98.6 F | HEART RATE: 66 BPM | DIASTOLIC BLOOD PRESSURE: 76 MMHG | OXYGEN SATURATION: 97 % | BODY MASS INDEX: 41.09 KG/M2 | SYSTOLIC BLOOD PRESSURE: 118 MMHG | WEIGHT: 239.4 LBS | RESPIRATION RATE: 16 BRPM

## 2025-03-21 DIAGNOSIS — B34.9 VIRAL ILLNESS: Primary | ICD-10-CM

## 2025-03-21 PROCEDURE — 3074F SYST BP LT 130 MM HG: CPT | Performed by: NURSE PRACTITIONER

## 2025-03-21 PROCEDURE — G8417 CALC BMI ABV UP PARAM F/U: HCPCS | Performed by: NURSE PRACTITIONER

## 2025-03-21 PROCEDURE — G8427 DOCREV CUR MEDS BY ELIG CLIN: HCPCS | Performed by: NURSE PRACTITIONER

## 2025-03-21 PROCEDURE — 99213 OFFICE O/P EST LOW 20 MIN: CPT | Performed by: NURSE PRACTITIONER

## 2025-03-21 PROCEDURE — 1036F TOBACCO NON-USER: CPT | Performed by: NURSE PRACTITIONER

## 2025-03-21 PROCEDURE — 3078F DIAST BP <80 MM HG: CPT | Performed by: NURSE PRACTITIONER

## 2025-03-21 PROCEDURE — 3017F COLORECTAL CA SCREEN DOC REV: CPT | Performed by: NURSE PRACTITIONER

## 2025-03-21 ASSESSMENT — PATIENT HEALTH QUESTIONNAIRE - PHQ9
6. FEELING BAD ABOUT YOURSELF - OR THAT YOU ARE A FAILURE OR HAVE LET YOURSELF OR YOUR FAMILY DOWN: NOT AT ALL
SUM OF ALL RESPONSES TO PHQ QUESTIONS 1-9: 0
SUM OF ALL RESPONSES TO PHQ QUESTIONS 1-9: 0
5. POOR APPETITE OR OVEREATING: NOT AT ALL
10. IF YOU CHECKED OFF ANY PROBLEMS, HOW DIFFICULT HAVE THESE PROBLEMS MADE IT FOR YOU TO DO YOUR WORK, TAKE CARE OF THINGS AT HOME, OR GET ALONG WITH OTHER PEOPLE: NOT DIFFICULT AT ALL
4. FEELING TIRED OR HAVING LITTLE ENERGY: NOT AT ALL
8. MOVING OR SPEAKING SO SLOWLY THAT OTHER PEOPLE COULD HAVE NOTICED. OR THE OPPOSITE, BEING SO FIGETY OR RESTLESS THAT YOU HAVE BEEN MOVING AROUND A LOT MORE THAN USUAL: NOT AT ALL
SUM OF ALL RESPONSES TO PHQ QUESTIONS 1-9: 0
7. TROUBLE CONCENTRATING ON THINGS, SUCH AS READING THE NEWSPAPER OR WATCHING TELEVISION: NOT AT ALL
3. TROUBLE FALLING OR STAYING ASLEEP: NOT AT ALL
2. FEELING DOWN, DEPRESSED OR HOPELESS: NOT AT ALL
1. LITTLE INTEREST OR PLEASURE IN DOING THINGS: NOT AT ALL
SUM OF ALL RESPONSES TO PHQ QUESTIONS 1-9: 0
9. THOUGHTS THAT YOU WOULD BE BETTER OFF DEAD, OR OF HURTING YOURSELF: NOT AT ALL

## 2025-03-21 ASSESSMENT — ENCOUNTER SYMPTOMS
RESPIRATORY NEGATIVE: 1
EYES NEGATIVE: 1
GASTROINTESTINAL NEGATIVE: 1
ALLERGIC/IMMUNOLOGIC NEGATIVE: 1

## 2025-03-21 NOTE — PROGRESS NOTES
[facility]  CARMEN WALL CNP  [facility] PRIMARY CARE  [address] [place] [id number]  Dept: [phone]  Dept Fax: [phone]      Name: Tanisha Coleman  : 1965         Chief Complaint:     Chief Complaint   Patient presents with    Tachycardia     X 3 days. Patient c/o fast heart/racing heart rate and low BP readings. Patient feels super tired at times as well.     Blood Pressure Check       History of Present Illness:      Tanisha Coleman is a 59 y.o.  female who presents with Tachycardia (X 3 days. Patient c/o fast heart/racing heart rate and low BP readings. Patient feels super tired at times as well. ) and Blood Pressure Check      HPI    Tanisha is here today for a sick visit.  A couple days ago she was laying in bed to watch TV and scratch off a lottery ticket and was unable to stay awake. She has continued to be fatigued. Then a couple days later her blood pressure dropped 70/55 and she was clammy and didn't feel right.  The next morning she had a fever that lasted for 2 days.  Her high temperature was 101.9 and then had a low grade temperature. Today she has not had a fever.  She has had some headaches.  The fever is gone but she still feels tired.  She is seeping okay at night.  Her blood pressures have been normal since the one incident.    Negative Covid test at home.    Past Medical History:     Past Medical History:   Diagnosis Date    Anxiety     Arthritis     Asthma     Bipolar disorder (HCC)     Chronic back pain     COPD (chronic obstructive pulmonary disease) ([facility])     Depression     Eczema of face     Fibromyalgia     Genital warts     GERD (gastroesophageal reflux disease)     Hypertension     Neuropathy     Obesity     Osteoarthritis     Psychiatric problem     Restless legs     Sleep apnea     Stroke ([facility])       Reviewed all health maintenance requirements and ordered appropriate tests  Health Maintenance Due   Topic Date Due    Colorectal Cancer Screen

## 2025-03-21 NOTE — PATIENT INSTRUCTIONS
SURVEY:     You may be receiving a survey from [facility] [employer] regarding your visit today.     Please complete the survey to enable us to provide the highest quality of care to you and your family.     If you cannot score us a very good on any question, please call the office to discuss how we could have made your experience a very good one.     Thank you,    Melchor Schneider, APRN-CNP  Maddi Starks, APRN-CNP  Hermelinda, LPN  Kirsten, CMA  Silvano, CMA  Linda, CMA  Leslie, PCA  Consuelo, CMA  Sonia, PM

## 2025-03-24 DIAGNOSIS — G25.81 RLS (RESTLESS LEGS SYNDROME): ICD-10-CM

## 2025-03-24 DIAGNOSIS — F31.9 BIPOLAR 1 DISORDER (HCC): ICD-10-CM

## 2025-03-24 RX ORDER — ASPIRIN 81 MG/1
81 TABLET ORAL DAILY
Qty: 30 TABLET | Refills: 3 | Status: SHIPPED | OUTPATIENT
Start: 2025-03-24

## 2025-03-24 RX ORDER — LORAZEPAM 1 MG/1
1 TABLET ORAL 2 TIMES DAILY
Qty: 60 TABLET | Refills: 0 | Status: SHIPPED | OUTPATIENT
Start: 2025-03-24 | End: 2025-04-23

## 2025-03-24 RX ORDER — METOPROLOL TARTRATE 50 MG
TABLET ORAL
Qty: 90 TABLET | Refills: 1 | Status: SHIPPED | OUTPATIENT
Start: 2025-03-24

## 2025-03-24 NOTE — TELEPHONE ENCOUNTER
Health Maintenance   Topic Date Due    Colorectal Cancer Screen  Never done    Pneumococcal 50+ years Vaccine (2 of 2 - PPSV23) 12/29/2020    Lipids  01/21/2025    DTaP/Tdap/Td vaccine (1 - Tdap) 04/03/2025 (Originally 7/10/2019)    Hepatitis C screen  04/03/2025 (Originally 9/7/1984)    HIV screen  04/03/2025 (Originally 9/7/1981)    Hepatitis B vaccine (1 of 3 - 19+ 3-dose series) 10/10/2025 (Originally 9/7/1985)    Depression Monitoring  03/21/2026    Breast cancer screen  06/20/2026    Diabetes screen  10/10/2026    Cervical cancer screen  02/24/2027    Flu vaccine  Completed    Shingles vaccine  Completed    COVID-19 Vaccine  Completed    Hepatitis A vaccine  Aged Out    Hib vaccine  Aged Out    Polio vaccine  Aged Out    Meningococcal (ACWY) vaccine  Aged Out    Meningococcal B vaccine  Aged Out    Pneumococcal 0-49 years Vaccine  Discontinued             (applicable per patient's age: Cancer Screenings, Depression Screening, Fall Risk Screening, Immunizations)    Hemoglobin A1C (%)   Date Value   10/10/2023 5.1     AST (U/L)   Date Value   08/13/2024 32 (H)     ALT (U/L)   Date Value   08/13/2024 26     BUN (mg/dL)   Date Value   08/13/2024 10      (goal A1C is < 7)   (goal LDL is <100) need 30-50% reduction from baseline     BP Readings from Last 3 Encounters:   03/21/25 118/76   03/12/25 (!) 114/91   02/17/25 104/66    (goal /80)      All Future Testing planned in [facility]:  Lab Frequency Next Occurrence   US NON OB TRANSVAGINAL Once 06/25/2024   CT ABDOMEN PELVIS WO CONTRAST Additional Contrast? Radiologist Recommendation Once 11/13/2024   XR KNEE RIGHT (3 VIEWS) Once 02/17/2025   Lipid Panel Once 05/17/2025   Hemoglobin A1C Once 05/17/2025   Comprehensive Metabolic Panel, Fasting Once 05/17/2025   CBC with Auto Differential Once 05/17/2025   Iron and TIBC Once 05/17/2025       Next Visit Date:  Future Appointments   Date Time Provider Department Center   5/19/2025 11:00 AM Maddi Starks APRN -

## 2025-04-25 DIAGNOSIS — M25.561 RIGHT ANTERIOR KNEE PAIN: ICD-10-CM

## 2025-04-25 RX ORDER — CELECOXIB 100 MG/1
100 CAPSULE ORAL 2 TIMES DAILY
Qty: 60 CAPSULE | Refills: 0 | Status: SHIPPED | OUTPATIENT
Start: 2025-04-25

## 2025-04-25 NOTE — TELEPHONE ENCOUNTER
Health Maintenance   Topic Date Due    HIV screen  Never done    Hepatitis C screen  Never done    Colorectal Cancer Screen  Never done    DTaP/Tdap/Td vaccine (1 - Tdap) 07/10/2019    Pneumococcal 50+ years Vaccine (2 of 2 - PPSV23) 12/29/2020    Lipids  01/21/2025    Hepatitis B vaccine (1 of 3 - 19+ 3-dose series) 10/10/2025 (Originally 9/7/1985)    Depression Monitoring  03/21/2026    Breast cancer screen  06/20/2026    Diabetes screen  10/10/2026    Cervical cancer screen  02/24/2027    Flu vaccine  Completed    Shingles vaccine  Completed    COVID-19 Vaccine  Completed    Hepatitis A vaccine  Aged Out    Hib vaccine  Aged Out    Polio vaccine  Aged Out    Meningococcal (ACWY) vaccine  Aged Out    Meningococcal B vaccine  Aged Out    Pneumococcal 0-49 years Vaccine  Discontinued             (applicable per patient's age: Cancer Screenings, Depression Screening, Fall Risk Screening, Immunizations)    Hemoglobin A1C (%)   Date Value   10/10/2023 5.1     AST (U/L)   Date Value   08/13/2024 32 (H)     ALT (U/L)   Date Value   08/13/2024 26     BUN (mg/dL)   Date Value   08/13/2024 10      (goal A1C is < 7)   (goal LDL is <100) need 30-50% reduction from baseline     BP Readings from Last 3 Encounters:   03/21/25 118/76   03/12/25 (!) 114/91   02/17/25 104/66    (goal /80)      All Future Testing planned in [facility]:  Lab Frequency Next Occurrence   US NON OB TRANSVAGINAL Once 06/25/2024   CT ABDOMEN PELVIS WO CONTRAST Additional Contrast? Radiologist Recommendation Once 11/13/2024   XR KNEE RIGHT (3 VIEWS) Once 02/17/2025   Lipid Panel Once 05/17/2025   Hemoglobin A1C Once 05/17/2025   Comprehensive Metabolic Panel, Fasting Once 05/17/2025   CBC with Auto Differential Once 05/17/2025   Iron and TIBC Once 05/17/2025       Next Visit Date:  Future Appointments   Date Time Provider Department Center   5/19/2025 11:00 AM Maddi Starks, APRN - CNP [facility] DEP            Patient Active Problem

## 2025-05-15 ENCOUNTER — APPOINTMENT (OUTPATIENT)
Dept: GENERAL RADIOLOGY | Age: 60
End: 2025-05-15
Payer: COMMERCIAL

## 2025-05-15 ENCOUNTER — TELEPHONE (OUTPATIENT)
Dept: PRIMARY CARE CLINIC | Age: 60
End: 2025-05-15

## 2025-05-15 ENCOUNTER — HOSPITAL ENCOUNTER (EMERGENCY)
Age: 60
Discharge: HOME | End: 2025-05-15
Attending: EMERGENCY MEDICINE
Payer: COMMERCIAL

## 2025-05-15 VITALS
DIASTOLIC BLOOD PRESSURE: 94 MMHG | BODY MASS INDEX: 40.97 KG/M2 | HEART RATE: 78 BPM | WEIGHT: 240 LBS | TEMPERATURE: 98.3 F | SYSTOLIC BLOOD PRESSURE: 142 MMHG | RESPIRATION RATE: 20 BRPM | HEIGHT: 64 IN | OXYGEN SATURATION: 95 %

## 2025-05-15 DIAGNOSIS — M54.31 SCIATICA OF RIGHT SIDE: Primary | ICD-10-CM

## 2025-05-15 PROCEDURE — 99283 EMERGENCY DEPT VISIT LOW MDM: CPT

## 2025-05-15 PROCEDURE — 6370000000 HC RX 637 (ALT 250 FOR IP): Performed by: EMERGENCY MEDICINE

## 2025-05-15 PROCEDURE — 73502 X-RAY EXAM HIP UNI 2-3 VIEWS: CPT

## 2025-05-15 RX ORDER — PREDNISONE 20 MG/1
40 TABLET ORAL DAILY
Qty: 10 TABLET | Refills: 0 | Status: SHIPPED | OUTPATIENT
Start: 2025-05-15 | End: 2025-05-20

## 2025-05-15 RX ORDER — PREDNISONE 20 MG/1
40 TABLET ORAL ONCE
Status: COMPLETED | OUTPATIENT
Start: 2025-05-15 | End: 2025-05-15

## 2025-05-15 RX ORDER — CYCLOBENZAPRINE HCL 10 MG
10 TABLET ORAL 3 TIMES DAILY PRN
Qty: 21 TABLET | Refills: 0 | Status: SHIPPED | OUTPATIENT
Start: 2025-05-15 | End: 2025-05-25

## 2025-05-15 RX ADMIN — PREDNISONE 40 MG: 20 TABLET ORAL at 04:32

## 2025-05-15 ASSESSMENT — LIFESTYLE VARIABLES
HOW OFTEN DO YOU HAVE A DRINK CONTAINING ALCOHOL: NEVER
HOW MANY STANDARD DRINKS CONTAINING ALCOHOL DO YOU HAVE ON A TYPICAL DAY: PATIENT DOES NOT DRINK

## 2025-05-15 ASSESSMENT — PAIN - FUNCTIONAL ASSESSMENT: PAIN_FUNCTIONAL_ASSESSMENT: 0-10

## 2025-05-15 ASSESSMENT — ENCOUNTER SYMPTOMS
SORE THROAT: 0
BACK PAIN: 0
SHORTNESS OF BREATH: 0
ABDOMINAL DISTENTION: 0

## 2025-05-15 ASSESSMENT — PAIN SCALES - GENERAL: PAINLEVEL_OUTOF10: 10

## 2025-05-15 NOTE — DISCHARGE INSTRUCTIONS
Please follow-up with your doctor in the next 1 to 2 days to get set up for physical therapy.  Start taking the prednisone and Flexeril as prescribed.  Do not operate heavy machinery or drive a vehicle when taking the Flexeril.  Avoid taking the muscle relaxer and the Percocet at the same time.  Heating pad may also be helpful.

## 2025-05-15 NOTE — ED PROVIDER NOTES
were completed with a voice recognition program.  Efforts were made to edit the dictations but occasionally words are mis-transcribed.)    Leslie Grant DO (electronically signed)  Attending Emergency Physician            Leslie Grant DO  05/15/25 0410

## 2025-05-16 NOTE — TELEPHONE ENCOUNTER
Care Transitions Initial Follow Up Call    Outreach made within 2 business days of discharge: Yes    Patient: Tanisha Coleman Patient : 1965   MRN: [id number]  Reason for Admission: sciatica of right side   Discharge Date: 5/15/25       Spoke with:     Discharge department/facility: [facility]     TCM Interactive Patient Contact:  Was patient able to fill all prescriptions:   Was patient instructed to bring all medications to the follow-up visit:   Is patient taking all medications as directed in the discharge summary?   Does patient understand their discharge instructions:   Does patient have questions or concerns that need addressed prior to 7-14 day follow up office visit:     Additional needs identified to be addressed with provider               Scheduled appointment with PCP within 7-14 days    Follow Up  Future Appointments   Date Time Provider Department Center   2025 11:00 AM Maddi Starks APRN - CNP [facility] DEP       Linda Johnston MA

## 2025-05-19 ENCOUNTER — OFFICE VISIT (OUTPATIENT)
Dept: PRIMARY CARE CLINIC | Age: 60
End: 2025-05-19
Payer: COMMERCIAL

## 2025-05-19 VITALS
WEIGHT: 236.6 LBS | HEART RATE: 85 BPM | TEMPERATURE: 99.4 F | OXYGEN SATURATION: 97 % | SYSTOLIC BLOOD PRESSURE: 128 MMHG | BODY MASS INDEX: 40.61 KG/M2 | DIASTOLIC BLOOD PRESSURE: 72 MMHG

## 2025-05-19 DIAGNOSIS — Z23 NEED FOR PNEUMOCOCCAL VACCINE: ICD-10-CM

## 2025-05-19 DIAGNOSIS — M79.7 FIBROMYALGIA: ICD-10-CM

## 2025-05-19 DIAGNOSIS — Z12.31 ENCOUNTER FOR SCREENING MAMMOGRAM FOR BREAST CANCER: ICD-10-CM

## 2025-05-19 DIAGNOSIS — Z00.00 ENCOUNTER FOR WELL ADULT EXAM WITHOUT ABNORMAL FINDINGS: Primary | ICD-10-CM

## 2025-05-19 DIAGNOSIS — Z12.11 COLON CANCER SCREENING: ICD-10-CM

## 2025-05-19 DIAGNOSIS — M54.41 ACUTE LOW BACK PAIN WITH RIGHT-SIDED SCIATICA, UNSPECIFIED BACK PAIN LATERALITY: ICD-10-CM

## 2025-05-19 DIAGNOSIS — Z23 NEED FOR TETANUS BOOSTER: ICD-10-CM

## 2025-05-19 PROCEDURE — 3017F COLORECTAL CA SCREEN DOC REV: CPT | Performed by: NURSE PRACTITIONER

## 2025-05-19 PROCEDURE — 1036F TOBACCO NON-USER: CPT | Performed by: NURSE PRACTITIONER

## 2025-05-19 PROCEDURE — G8427 DOCREV CUR MEDS BY ELIG CLIN: HCPCS | Performed by: NURSE PRACTITIONER

## 2025-05-19 PROCEDURE — 90677 PCV20 VACCINE IM: CPT | Performed by: NURSE PRACTITIONER

## 2025-05-19 PROCEDURE — 90715 TDAP VACCINE 7 YRS/> IM: CPT | Performed by: NURSE PRACTITIONER

## 2025-05-19 PROCEDURE — 99214 OFFICE O/P EST MOD 30 MIN: CPT | Performed by: NURSE PRACTITIONER

## 2025-05-19 PROCEDURE — 99396 PREV VISIT EST AGE 40-64: CPT | Performed by: NURSE PRACTITIONER

## 2025-05-19 PROCEDURE — 3074F SYST BP LT 130 MM HG: CPT | Performed by: NURSE PRACTITIONER

## 2025-05-19 PROCEDURE — G8417 CALC BMI ABV UP PARAM F/U: HCPCS | Performed by: NURSE PRACTITIONER

## 2025-05-19 PROCEDURE — 3078F DIAST BP <80 MM HG: CPT | Performed by: NURSE PRACTITIONER

## 2025-05-19 RX ORDER — ALBUTEROL SULFATE 90 UG/1
2 INHALANT RESPIRATORY (INHALATION) EVERY 6 HOURS PRN
Qty: 18 G | Refills: 3 | Status: SHIPPED | OUTPATIENT
Start: 2025-05-19

## 2025-05-19 RX ORDER — OXYCODONE HYDROCHLORIDE 15 MG/1
15 TABLET ORAL EVERY 4 HOURS PRN
COMMUNITY
Start: 2025-05-12

## 2025-05-19 RX ORDER — QUETIAPINE FUMARATE 50 MG/1
50 TABLET, FILM COATED ORAL 2 TIMES DAILY
COMMUNITY
Start: 2025-04-24

## 2025-05-19 RX ORDER — POLYETHYLENE GLYCOL 3350 17 G/17G
17 POWDER, FOR SOLUTION ORAL PRN
COMMUNITY
Start: 2025-04-11

## 2025-05-19 RX ORDER — ONDANSETRON 4 MG/1
4 TABLET, ORALLY DISINTEGRATING ORAL EVERY 8 HOURS PRN
COMMUNITY
Start: 2025-04-11

## 2025-05-19 SDOH — ECONOMIC STABILITY: FOOD INSECURITY: WITHIN THE PAST 12 MONTHS, YOU WORRIED THAT YOUR FOOD WOULD RUN OUT BEFORE YOU GOT MONEY TO BUY MORE.: NEVER TRUE

## 2025-05-19 SDOH — ECONOMIC STABILITY: FOOD INSECURITY: WITHIN THE PAST 12 MONTHS, THE FOOD YOU BOUGHT JUST DIDN'T LAST AND YOU DIDN'T HAVE MONEY TO GET MORE.: NEVER TRUE

## 2025-05-19 ASSESSMENT — PATIENT HEALTH QUESTIONNAIRE - PHQ9
6. FEELING BAD ABOUT YOURSELF - OR THAT YOU ARE A FAILURE OR HAVE LET YOURSELF OR YOUR FAMILY DOWN: NOT AT ALL
SUM OF ALL RESPONSES TO PHQ QUESTIONS 1-9: 0
1. LITTLE INTEREST OR PLEASURE IN DOING THINGS: NOT AT ALL
4. FEELING TIRED OR HAVING LITTLE ENERGY: NOT AT ALL
10. IF YOU CHECKED OFF ANY PROBLEMS, HOW DIFFICULT HAVE THESE PROBLEMS MADE IT FOR YOU TO DO YOUR WORK, TAKE CARE OF THINGS AT HOME, OR GET ALONG WITH OTHER PEOPLE: NOT DIFFICULT AT ALL
7. TROUBLE CONCENTRATING ON THINGS, SUCH AS READING THE NEWSPAPER OR WATCHING TELEVISION: NOT AT ALL
SUM OF ALL RESPONSES TO PHQ QUESTIONS 1-9: 0
9. THOUGHTS THAT YOU WOULD BE BETTER OFF DEAD, OR OF HURTING YOURSELF: NOT AT ALL
SUM OF ALL RESPONSES TO PHQ QUESTIONS 1-9: 0
5. POOR APPETITE OR OVEREATING: NOT AT ALL
2. FEELING DOWN, DEPRESSED OR HOPELESS: NOT AT ALL
3. TROUBLE FALLING OR STAYING ASLEEP: NOT AT ALL
SUM OF ALL RESPONSES TO PHQ QUESTIONS 1-9: 0
8. MOVING OR SPEAKING SO SLOWLY THAT OTHER PEOPLE COULD HAVE NOTICED. OR THE OPPOSITE, BEING SO FIGETY OR RESTLESS THAT YOU HAVE BEEN MOVING AROUND A LOT MORE THAN USUAL: NOT AT ALL

## 2025-05-19 NOTE — PATIENT INSTRUCTIONS
SURVEY:     You may be receiving a survey from [employer] regarding your visit today.     Please complete the survey to enable us to provide the highest quality of care to you and your family.     If you cannot score us a very good on any question, please call the office to discuss how we could have made your experience a very good one.     Thank you,    Melchor Schneider, APRN-CNP  Maddi Starks, APRN-CNP  Hermelinda, LPN  Kirsten, CMA  Silvano, CMA  Linda, CMA  Leslie, PCA  Consuelo, CMA  Sonia, PM        Well Visit, Ages 18 to 65: Care Instructions  Well visits can help you stay healthy. Your doctor has checked your overall health and may have suggested ways to take good care of yourself. Your doctor also may have recommended tests. You can help prevent illness with healthy eating, good sleep, vaccinations, regular exercise, and other steps.    Get the tests that you and your doctor decide on. Depending on your age and risks, examples might include screening for diabetes; hepatitis C; HIV; and cervical, breast, lung, and colon cancer. Screening helps find diseases before any symptoms appear.   Eat healthy foods. Choose fruits, vegetables, whole grains, lean protein, and low-fat dairy foods. Limit saturated fat and reduce salt.     Limit alcohol. Men should have no more than 2 drinks a day. Women should have no more than 1. For some people, no alcohol is the best choice.   Exercise. Get at least 30 minutes of exercise on most days of the week. Walking can be a good choice.     Reach and stay at your healthy weight. This will lower your risk for many health problems.   Take care of your mental health. Try to stay connected with friends, family, and community, and find ways to manage stress.     If you're feeling depressed or hopeless, talk to someone. A counselor can help. If you don't have a counselor, talk to your doctor.   Talk to your doctor if you think you may have a problem with alcohol or drug use. This includes

## 2025-05-19 NOTE — PROGRESS NOTES
After obtaining consent, and per orders of DURGA Pederson, injection of Prevnar-20 given in Right deltoid by Consuelo Stone MA. Patient instructed to remain in clinic for 20 minutes afterwards, and to report any adverse reaction to me immediately.      After obtaining consent, and per orders of DURGA Pederson, injection of Boostrix given in Left deltoid by Consuelo Stone MA. Patient instructed to remain in clinic for 20 minutes afterwards, and to report any adverse reaction to me immediately.

## 2025-05-19 NOTE — PROGRESS NOTES
Well Adult Note  Name: Tanisha Coleman Today’s Date: 2025   MRN: [id number] Sex: Female   Age: 59 y.o. Ethnicity: Non- / Non    : 1965 Race: White (non-)      Tanisha Coleman is here for a well adult exam.       Assessment & Plan   Encounter for well adult exam without abnormal findings  Acute low back pain with right-sided sciatica, unspecified back pain laterality  -     [facility] - [place]  -     ESTABLISHED, MOD MDM, 30-39 MIN [38854]  Fibromyalgia  -     External Referral To Rheumatology  -     [facility] - [place]  -     ESTABLISHED, MOD MDM, 30-39 MIN [85938]  Encounter for screening mammogram for breast cancer  -     [facility] Digital Screen Bilateral [[id number]]; Future  Colon cancer screening  -     Fecal DNA Colorectal cancer screening (Cologuard)  Need for pneumococcal vaccine  -     Pneumococcal, PCV20, PREVNAR 20, (age 6w+), IM, PF  Need for tetanus booster  -     Tdap, BOOSTRIX, (age 10 yrs+), IM        Return in 3 months (on 2025) for CPE (Physical Exam).       Subjective   History:    Was seen in the ER on 5/15/2025 for right sciatica pain.  She was having significant pain in her right hip despite the medications she gets from palliative care.  It was recommended that she start Physical Therapy.  She has been having increased fibromyalgia pain.  She has tried a cane and sometimes can not walk for more than 20 minutes. She is having lower leg and arm pain.  She has bilateral arm weakness.  She is requesting to be referred to a rheumatologist.  She was seen by one in the past but was many years ago.  Palliative care recommends that she look into a scooter and would need evaluated by PT.    She sees Psychologist in [place].  She was recently started on Seroquel.  The pain has made her depression worse.    Review of Systems   Constitutional: Negative.    HENT: Negative.     Eyes: Negative.    Respiratory: Negative.     Cardiovascular: Negative.

## 2025-05-20 ENCOUNTER — HOSPITAL ENCOUNTER (OUTPATIENT)
Age: 60
Discharge: HOME | End: 2025-05-20
Payer: COMMERCIAL

## 2025-05-20 DIAGNOSIS — Z13.220 LIPID SCREENING: ICD-10-CM

## 2025-05-20 DIAGNOSIS — Z13.1 DIABETES MELLITUS SCREENING: ICD-10-CM

## 2025-05-20 DIAGNOSIS — I10 PRIMARY HYPERTENSION: ICD-10-CM

## 2025-05-20 DIAGNOSIS — D52.9 ANEMIA DUE TO FOLIC ACID DEFICIENCY, UNSPECIFIED DEFICIENCY TYPE: ICD-10-CM

## 2025-05-20 LAB
ALBUMIN SERPL-MCNC: 4 G/DL (ref 3.5–5.2)
ALBUMIN/GLOB SERPL: 1.3 {RATIO} (ref 1–2.5)
ALP SERPL-CCNC: 86 U/L (ref 35–104)
ALT SERPL-CCNC: 9 U/L (ref 10–35)
ANION GAP SERPL CALCULATED.3IONS-SCNC: 13 MMOL/L (ref 9–16)
AST SERPL-CCNC: 22 U/L (ref 10–35)
BASOPHILS # BLD: 0.2 K/UL (ref 0–0.2)
BASOPHILS NFR BLD: 1 % (ref 0–2)
BILIRUB SERPL-MCNC: 0.3 MG/DL (ref 0–1.2)
BUN SERPL-MCNC: 17 MG/DL (ref 6–20)
BUN/CREAT SERPL: 24 (ref 9–20)
CALCIUM SERPL-MCNC: 9.1 MG/DL (ref 8.6–10.4)
CHLORIDE SERPL-SCNC: 101 MMOL/L (ref 98–107)
CO2 SERPL-SCNC: 27 MMOL/L (ref 20–31)
CREAT SERPL-MCNC: 0.7 MG/DL (ref 0.5–0.9)
EOSINOPHIL # BLD: 0.31 K/UL (ref 0–0.44)
EOSINOPHILS RELATIVE PERCENT: 2 % (ref 1–4)
ERYTHROCYTE [DISTWIDTH] IN BLOOD BY AUTOMATED COUNT: 13.3 % (ref 11.8–14.4)
GFR, ESTIMATED: >90 ML/MIN/1.73M2
GLUCOSE P FAST SERPL-MCNC: 118 MG/DL (ref 74–99)
HCT VFR BLD AUTO: 45.1 % (ref 36.3–47.1)
HGB BLD-MCNC: 14.3 G/DL (ref 11.9–15.1)
IMM GRANULOCYTES # BLD AUTO: 0.23 K/UL (ref 0–0.3)
IMM GRANULOCYTES NFR BLD: 2 %
LYMPHOCYTES NFR BLD: 2.88 K/UL (ref 1.1–3.7)
LYMPHOCYTES RELATIVE PERCENT: 19 % (ref 24–43)
MCH RBC QN AUTO: 30.3 PG (ref 25.2–33.5)
MCHC RBC AUTO-ENTMCNC: 31.7 G/DL (ref 28.4–34.8)
MCV RBC AUTO: 95.6 FL (ref 82.6–102.9)
MONOCYTES NFR BLD: 0.69 K/UL (ref 0.1–1.2)
MONOCYTES NFR BLD: 5 % (ref 3–12)
NEUTROPHILS NFR BLD: 71 % (ref 36–65)
NEUTS SEG NFR BLD: 10.61 K/UL (ref 1.5–8.1)
NRBC BLD-RTO: 0 PER 100 WBC
PLATELET # BLD AUTO: 258 K/UL (ref 138–453)
PMV BLD AUTO: 10.4 FL (ref 8.1–13.5)
POTASSIUM SERPL-SCNC: 3.9 MMOL/L (ref 3.7–5.3)
PROT SERPL-MCNC: 7.2 G/DL (ref 6.6–8.7)
RBC # BLD AUTO: 4.72 M/UL (ref 3.95–5.11)
SODIUM SERPL-SCNC: 141 MMOL/L (ref 136–145)
WBC OTHER # BLD: 14.9 K/UL (ref 3.5–11.3)

## 2025-05-20 PROCEDURE — 85025 COMPLETE CBC W/AUTO DIFF WBC: CPT

## 2025-05-20 PROCEDURE — 83550 IRON BINDING TEST: CPT

## 2025-05-20 PROCEDURE — 83540 ASSAY OF IRON: CPT

## 2025-05-20 PROCEDURE — 80061 LIPID PANEL: CPT

## 2025-05-20 PROCEDURE — 36415 COLL VENOUS BLD VENIPUNCTURE: CPT

## 2025-05-20 PROCEDURE — 80053 COMPREHEN METABOLIC PANEL: CPT

## 2025-05-20 PROCEDURE — 83036 HEMOGLOBIN GLYCOSYLATED A1C: CPT

## 2025-05-21 ENCOUNTER — RESULTS FOLLOW-UP (OUTPATIENT)
Dept: PRIMARY CARE CLINIC | Age: 60
End: 2025-05-21

## 2025-05-21 LAB
CHOLEST SERPL-MCNC: 170 MG/DL (ref 0–199)
CHOLESTEROL/HDL RATIO: 4.5
EST. AVERAGE GLUCOSE BLD GHB EST-MCNC: 108 MG/DL
HBA1C MFR BLD: 5.4 % (ref 4–6)
HDLC SERPL-MCNC: 38 MG/DL
IRON SATN MFR SERPL: 28 % (ref 20–55)
IRON SERPL-MCNC: 84 UG/DL (ref 37–145)
LDLC SERPL CALC-MCNC: 67 MG/DL (ref 0–100)
TIBC SERPL-MCNC: 296 UG/DL (ref 250–450)
TRIGL SERPL-MCNC: 324 MG/DL
UNSATURATED IRON BINDING CAPACITY: 212 UG/DL (ref 112–347)
VLDLC SERPL CALC-MCNC: 65 MG/DL (ref 1–30)

## 2025-05-21 NOTE — TELEPHONE ENCOUNTER
----- Message from AIMEE Pederson CNP sent at 5/21/2025  9:07 AM EDT -----  Please notify patient of stable lab results.  Triglycerides are elevated.  I recommend taking and OTC omega 3 supplement and lifestyle modifications.  Thanks Maddi

## 2025-05-30 DIAGNOSIS — F31.9 BIPOLAR 1 DISORDER (HCC): ICD-10-CM

## 2025-05-30 RX ORDER — LORAZEPAM 1 MG/1
1 TABLET ORAL 2 TIMES DAILY
Qty: 60 TABLET | Refills: 0 | Status: SHIPPED | OUTPATIENT
Start: 2025-05-30 | End: 2025-06-29

## 2025-05-30 NOTE — TELEPHONE ENCOUNTER
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

## 2025-06-03 ENCOUNTER — HOSPITAL ENCOUNTER (OUTPATIENT)
Dept: PHYSICAL THERAPY | Age: 60
Setting detail: THERAPIES SERIES
Discharge: HOME | End: 2025-06-03
Payer: COMMERCIAL

## 2025-06-03 ENCOUNTER — TELEPHONE (OUTPATIENT)
Dept: PRIMARY CARE CLINIC | Age: 60
End: 2025-06-03

## 2025-06-03 DIAGNOSIS — D50.9 IRON DEFICIENCY ANEMIA, UNSPECIFIED IRON DEFICIENCY ANEMIA TYPE: ICD-10-CM

## 2025-06-03 DIAGNOSIS — D52.9 ANEMIA DUE TO FOLIC ACID DEFICIENCY, UNSPECIFIED DEFICIENCY TYPE: ICD-10-CM

## 2025-06-03 PROCEDURE — 97161 PT EVAL LOW COMPLEX 20 MIN: CPT

## 2025-06-03 RX ORDER — FERROUS SULFATE 325(65) MG
325 TABLET ORAL
Qty: 90 TABLET | Refills: 1 | Status: SHIPPED | OUTPATIENT
Start: 2025-06-03

## 2025-06-03 NOTE — PROGRESS NOTES
Phone: [phone]                       [facility]          Fax: [phone]                      Outpatient Physical Therapy                                                                      Evaluation  Date: 6/3/2025  Patient: Tanisha Coleman  : 1965  CSN #: [id number]    Referring Physician: Maddi Starks APRN*    Medical Diagnosis: M54.41 acute low back pain with right sciatica, M79.7 fibromyalgia    Treatment Diagnosis: low back pain with radiation  PT Insurance Information: [place]  Total # of Visits Approved: 9   Total # of Visits to Date: 1  No Show: 0  Canceled Appointment: 0    [x] This writer acknowledges review of patient history form     Subjective  Subjective: Pt with low back pain that radiates down to the knee B, pain worse on R than L. Pt reports feeling like she is gonna fall multiple time during the day.       Objective      Strength       Strength LLE  L Hip Flexion: 4-/5  L Hip ABduction: 4/5  L Hip ADduction: 4/5  L Knee Flexion: 4-/5  L Knee Extension: 4/5    Right Strength         Strength RLE  R Hip Flexion: 4-/5  R Hip ABduction: 4/5  R Hip ADduction: 4/5  R Knee Flexion: 4-/5  R Knee Extension: 4/5              Lumbar Assessment     AROM Lumbar Spine   Lumbar Spine AROM : Painful  Measured as: % of normal  Flexion: 20  Extension: 20  Lateral Flexion Right: 20  Lateral Flexion Left: 20  Right Rotation: 20  Left Rotation: 20       Special Tests:   Special Tests Lumbar Spine  SLR: L (+), R (-)  Slump Test: L (-), R (+)    Exercises:  Exercise 1: HEP: PTB hip abd in supine, piriformis stretch        Assessment  Body Structures, Functions, Activity Limitations Requiring Skilled Therapeutic Intervention: Decreased functional mobility , Decreased ADL status, Decreased ROM, Decreased body mechanics, Decreased strength, Increased pain, Decreased posture, Decreased balance  Assessment: Pt is a 59 y.o. female who presents with low back pain with radiation to the B LE, R

## 2025-06-03 NOTE — PROGRESS NOTES
[facility]           Phone: [phone]             Outpatient Physical Therapy  Fax: [phone]                                           Date: 6/3/2025  Patient: Tanisha Coleman : 1965 CSN #: [id number]   Referring Physician: Maddi Starks APRN*      [x] Plan of Care   [] Updated Plan of Care    Dates of Service to Include: 6/3/2025 to 25    Diagnosis:  M54.41 acute low back pain with right sciatica, M79.7 fibromyalgia     Rehab (Treatment) Diagnosis:  low back pain with radiation           Attendance  Total # of Visits to Date: 1 No Show: 0 Canceled Appointment: 0    Assessment  Body Structures, Functions, Activity Limitations Requiring Skilled Therapeutic Intervention: Decreased functional mobility , Decreased ADL status, Decreased ROM, Decreased body mechanics, Decreased strength, Increased pain, Decreased posture, Decreased balance  Assessment: Pt is a 59 y.o. female who presents with low back pain with radiation to the B LE, R worse than L. Pain goes from the back to the knee. Pt demos (+) SLR and slump on R and (-) on L. Pt display limited lumbar ROM in all planes with increased pain. Difficulty with transfering from supine to sit and vice versa, requiring mod A x 1 d/t incrased pain. Demos B LE weakness in the hips and core at 4/5 grossly. Pt would benefit from skilled therapy in order to address these deficits and return to PLOF.      Goals  Short Term Goals  Time Frame for Short Term Goals: 2 weeks  Short Term Goal 1: Pt will be inititated with HEP.  Short Term Goal 2: Pt will tolerate 30-40 minutes of aquatic ther ex to improve function with ADL's.  Long Term Goals  Time Frame for Long Term Goals : 6 weeks  Long Term Goal 1: Pt will independant and compliant with HEP to maintain functional gains made at therapy.  Long Term Goal 2: Pt will report 3/10 or less low back pain on average to

## 2025-06-03 NOTE — TELEPHONE ENCOUNTER
Dr. Ceron office faxed back stating there is insufficient clinical data to suggest the presence of an inflammatory or rheumatic disease so they will not schedule the patient please advise.

## 2025-06-05 DIAGNOSIS — M25.561 RIGHT ANTERIOR KNEE PAIN: ICD-10-CM

## 2025-06-05 NOTE — TELEPHONE ENCOUNTER
Health Maintenance   Topic Date Due    Colorectal Cancer Screen  Never done    Hepatitis B vaccine (1 of 3 - 19+ 3-dose series) 10/10/2025 (Originally 9/7/1985)    Hepatitis C screen  05/19/2026 (Originally 9/7/1984)    HIV screen  05/19/2026 (Originally 9/7/1981)    Depression Monitoring  05/19/2026    Lipids  05/20/2026    Breast cancer screen  06/20/2026    Cervical cancer screen  02/24/2027    Diabetes screen  05/20/2028    DTaP/Tdap/Td vaccine (2 - Td or Tdap) 05/19/2035    Flu vaccine  Completed    Shingles vaccine  Completed    Pneumococcal 50+ years Vaccine  Completed    COVID-19 Vaccine  Completed    Hepatitis A vaccine  Aged Out    Hib vaccine  Aged Out    Polio vaccine  Aged Out    Meningococcal (ACWY) vaccine  Aged Out    Meningococcal B vaccine  Aged Out    Pneumococcal 0-49 years Vaccine  Discontinued             (applicable per patient's age: Cancer Screenings, Depression Screening, Fall Risk Screening, Immunizations)    Hemoglobin A1C (%)   Date Value   05/20/2025 5.4   10/10/2023 5.1     AST (U/L)   Date Value   05/20/2025 22     ALT (U/L)   Date Value   05/20/2025 9 (L)     BUN (mg/dL)   Date Value   05/20/2025 17      (goal A1C is < 7)   (goal LDL is <100) need 30-50% reduction from baseline     BP Readings from Last 3 Encounters:   05/19/25 128/72   05/15/25 (!) 142/94   03/21/25 118/76    (goal /80)      All Future Testing planned in [facility]:  Lab Frequency Next Occurrence   US NON OB TRANSVAGINAL Once 06/25/2024   CT ABDOMEN PELVIS WO CONTRAST Additional Contrast? Radiologist Recommendation Once 11/13/2024   XR KNEE RIGHT (3 VIEWS) Once 02/17/2025   ALEXY Digital Screen Bilateral [[id number]] Once 06/18/2025       Next Visit Date:  Future Appointments   Date Time Provider Department Center   6/11/2025  1:45 PM Ruddy Quintero, PT [facility] [place]   6/13/2025  2:00 PM Madison Astudillo PTA [facility] [place]   6/17/2025  2:15 PM Devyn Ferguson PTA [facility] [place]   6/19/2025 12:45 PM Ivy Chaudhari, PTA

## 2025-06-06 LAB — NONINV COLON CA DNA+OCC BLD SCRN STL QL: POSITIVE

## 2025-06-06 RX ORDER — CELECOXIB 100 MG/1
100 CAPSULE ORAL 2 TIMES DAILY
Qty: 60 CAPSULE | Refills: 0 | Status: SHIPPED | OUTPATIENT
Start: 2025-06-06

## 2025-06-09 ENCOUNTER — RESULTS FOLLOW-UP (OUTPATIENT)
Dept: PRIMARY CARE CLINIC | Age: 60
End: 2025-06-09

## 2025-06-09 ENCOUNTER — TELEPHONE (OUTPATIENT)
Dept: PRIMARY CARE CLINIC | Age: 60
End: 2025-06-09

## 2025-06-09 DIAGNOSIS — R19.5 POSITIVE COLORECTAL CANCER SCREENING USING COLOGUARD TEST: Primary | ICD-10-CM

## 2025-06-09 NOTE — TELEPHONE ENCOUNTER
Patient notified of results and verbalized understanding. Patient sees Dr Dickey in [place]. Phone number provided to patient and she will contact them for a foollow up.

## 2025-06-09 NOTE — TELEPHONE ENCOUNTER
----- Message from AIMEE Pederson CNP sent at 6/9/2025  8:29 AM EDT -----  Please notify patient that their Colon screening was positive.  I have placed a referral to GI in the past and she will need to follow up with them for a colonoscopy.

## 2025-06-09 NOTE — TELEPHONE ENCOUNTER
Patient contacted the office and stated she contacted Dr Dickey office and they need a new referral for patient. Referral placed and faxed.

## 2025-06-11 ENCOUNTER — HOSPITAL ENCOUNTER (OUTPATIENT)
Dept: PHYSICAL THERAPY | Age: 60
Setting detail: THERAPIES SERIES
Discharge: HOME | End: 2025-06-11
Payer: COMMERCIAL

## 2025-06-11 PROCEDURE — 97113 AQUATIC THERAPY/EXERCISES: CPT

## 2025-06-11 NOTE — PROGRESS NOTES
HEP. - MET  Short Term Goal 2: Pt will tolerate 30-40 minutes of aquatic ther ex to improve function with ADL's. - MET    Long Term Goals  Time Frame for Long Term Goals : 6 weeks  Long Term Goal 1: Pt will independant and compliant with HEP to maintain functional gains made at therapy.  Long Term Goal 2: Pt will report 3/10 or less low back pain on average to facilitate completion of ADL's.  Long Term Goal 3: Pt to improve B hip strength to 4+/5 grossly for improved stability with transfers and ambulation.  Long Term Goal 4: Pt to demo (-) slump and SLR on R LE to display progression of symptoms.  Long Term Goal 5: Pt to report 70% improvement in symptoms to display increased QOL.    Minutes Tracking:  Time In: 1348  Time Out: 1430  Minutes: 42  Timed Code Treatment Minutes: 40 Minutes   Aquatic Therapy (40 minutes): Aquatic treatment performed per flow grid for  %% diminished weight loading exercises, Decreased muscle strength, Decreased range of motion, Decreased activity endurance, Decompression, Ease of movement, and Low impact and reduced weight bearing activity.  Cues provided for correct form with exercises.  Assistance by therapist provided for SUP with exercises.  Patient has difficulty with ROM and strength.       YUMIKO BLACKWOOD, PT, DPT     Date: 6/11/2025

## 2025-06-13 ENCOUNTER — HOSPITAL ENCOUNTER (OUTPATIENT)
Dept: PHYSICAL THERAPY | Age: 60
Setting detail: THERAPIES SERIES
Discharge: HOME | End: 2025-06-13
Payer: COMMERCIAL

## 2025-06-13 NOTE — PROGRESS NOTES
Physical Therapy  [facility]  Inpatient/Observation/Outpatient Rehabilitation    Date: 2025  Patient Name: Tanisha Coleman       [] Inpatient Acute/Observation       [x]  Outpatient  : 1965       Plan of Care/Recert ends      [] Pt refused/declined therapy at this time due to:           [x] Pt cancelled due to:  [x] No Reason Given   [] Sick/ill   [] Other:      [] Evaluation held by RN/Provider/Physical Therapist due to:    [] High Heart Rate   [] High Blood Pressure   [] Orthopedic Consult   [] Hgb < 7   [] Other:    [] Pt ordered brace per physician request:  [] Proper fit will be completed and education for wearing/skin checks    [] Pt does not require skilled services due to:      Therapist/Assistant will attempt to see this patient, at our earliest opportunity.       Consuelo Phillips Date: 2025

## 2025-06-17 ENCOUNTER — PATIENT MESSAGE (OUTPATIENT)
Dept: PRIMARY CARE CLINIC | Age: 60
End: 2025-06-17

## 2025-06-17 ENCOUNTER — HOSPITAL ENCOUNTER (OUTPATIENT)
Dept: PHYSICAL THERAPY | Age: 60
Setting detail: THERAPIES SERIES
Discharge: HOME | End: 2025-06-17
Payer: COMMERCIAL

## 2025-06-17 PROCEDURE — 97113 AQUATIC THERAPY/EXERCISES: CPT

## 2025-06-17 NOTE — PROGRESS NOTES
Physical Therapy  Phone: [phone]                 [facility]           Fax: [phone]                           Outpatient Physical Therapy                                                                            Daily Note    Patient: Tanisha Coleman : 1965  [facility] #: [id number]   Referring Physician: Maddi Starks APRN*  Date: 2025     Treatment Diagnosis: low back pain with radiation    PT Insurance Information: [place]  Total # of Visits Approved: 9 Per Physician Order  Total # of Visits to Date: 3  No Show: 0  Canceled Appointment: 0    25 Plan of Care/Recert Due    Pre-Treatment Pain:  8/10  Subjective: Patient reports 8/10 pain today, unable to localize pain states \" it is all over.\" Reports increase pain after last session and still sore today.    Exercises:  Exercise 1: HEP: PTB hip abd in supine, piriformis stretch-supervise when exiting pool  Exercise 2: aq: ambultion: forward/retro, lateral x 2 each  deep  Exercise 3: aq: sink ex x 10 each deep  Exercise 4: aq: seated at jets for muscle relaxaion x 5 min  Exercise 5: aq: seated: STS x 10  // SL STS x10 each (increased pulling on each leg) - held SL today due to soreness  Exercise 6: aq: deep well hang x 5 min // bicycles x 2 minutes    Assessment  Body Structures, Functions, Activity Limitations Requiring Skilled Therapeutic Intervention: Decreased functional mobility , Decreased ADL status, Decreased ROM, Decreased body mechanics, Decreased strength, Increased pain, Decreased posture, Decreased balance  Assessment: Continued with aquatics today to imrpove Bilat LEs/core strength and reduce pain levels with ADLs. Treatment modified with more excercises in deeper part of pool in offloaded enviroment 60-80% to reduce pain levels. Pt with less complaints of pain with excercises in deeper part of pool today, will progress as able. Patient needs to be supervised exiting pool, fatigue and pain levels increase fall

## 2025-06-18 NOTE — TELEPHONE ENCOUNTER
LVM to [facility] to call office back to see if they received referral or if she has been contacted to schedule.

## 2025-06-19 ENCOUNTER — HOSPITAL ENCOUNTER (OUTPATIENT)
Dept: PHYSICAL THERAPY | Age: 60
Setting detail: THERAPIES SERIES
Discharge: HOME | End: 2025-06-19
Payer: COMMERCIAL

## 2025-06-19 NOTE — PROGRESS NOTES
[facility]  Inpatient/Observation/Outpatient Rehabilitation    Date: 2025  Patient Name: Tanisha Coleman       [] Inpatient Acute/Observation       []  Outpatient  : 1965     [] Pt refused/declined therapy at this time due to:           [x] Pt cancelled due to:  [] No Reason Given   [] Sick/ill   [x] Other:  Pt arrives to clinic in extreme pain this date. Pt states that if she didn't have to change her clothes she would've tried today. Pt wheeled to car at this time. Therapist educated patient on reaching out to doctor or make an ER visit if pain remains the same. Patient with good understanding at this time. Patient expressed taking pain medication but it doesn't seem to help when she is in extreme pain. Educated patient on next therapy appointment of Monday at 2:15pm. Patient also expressed that she doesn't think/want to try land therapy as she thinks it will make her pain worse.     [] Evaluation held by RN/Provider/Physical Therapist due to:    [] High Heart Rate   [] High Blood Pressure   [] Orthopedic Consult   [] Hgb < 7   [] Other:    [] Pt ordered brace per physician request:  [] Proper fit will be completed and education for wearing/skin checks    [] Pt does not require skilled services due to:      Therapist/Assistant will attempt to see this patient, at our earliest opportunity.       Ivy Chaudhari, RIGO Date: 2025

## 2025-06-23 ENCOUNTER — HOSPITAL ENCOUNTER (OUTPATIENT)
Dept: PHYSICAL THERAPY | Age: 60
Setting detail: THERAPIES SERIES
Discharge: HOME | End: 2025-06-23
Payer: COMMERCIAL

## 2025-06-23 NOTE — PROGRESS NOTES
Physical Therapy  [facility]  Inpatient/Observation/Outpatient Rehabilitation    Date: 2025  Patient Name: Tanisha Coleman       [] Inpatient Acute/Observation       [x]  Outpatient  : 1965       Plan of Care/Recert ends      [] Pt refused/declined therapy at this time due to:           [x] Pt cancelled due to:  [] No Reason Given   [x] Sick/ill   [] Other:      [] Evaluation held by RN/Provider/Physical Therapist due to:    [] High Heart Rate   [] High Blood Pressure   [] Orthopedic Consult   [] Hgb < 7   [] Other:    [] Pt ordered brace per physician request:  [] Proper fit will be completed and education for wearing/skin checks    [] Pt does not require skilled services due to:      Therapist/Assistant will attempt to see this patient, at our earliest opportunity.       Consuelo Phillips Date: 2025

## 2025-06-26 ENCOUNTER — HOSPITAL ENCOUNTER (OUTPATIENT)
Dept: PHYSICAL THERAPY | Age: 60
Setting detail: THERAPIES SERIES
Discharge: HOME | End: 2025-06-26
Payer: COMMERCIAL

## 2025-06-26 PROCEDURE — 97113 AQUATIC THERAPY/EXERCISES: CPT

## 2025-06-26 NOTE — PROGRESS NOTES
Nomi Hernandez (OT) from [facility] stated she is changing the eval date from this week to next week. Thank you. Phone: [phone]                 [facility]           Fax: [phone]                           Outpatient Physical Therapy                                                                            Daily Note    Patient: Tanisha Coleman : 1965  [facility] #: [id number]   Referring Physician: Maddi Starks APRN*  Date: 2025    Diagnosis: M54.41 acute low back pain with right sciatica, M79.7 fibromyalgia  Treatment Diagnosis: low back pain with radiation    PT Insurance Information: [place]  Total # of Visits Approved: 9 Per Physician Order  Total # of Visits to Date: 4  No Show: 0  Canceled Appointment: 1    25 Plan of Care/Recert Due    Pre-Treatment Pain:  8/10  Subjective: Patient reports 8/10 LBP with R radicular sxs.    Exercises:  Exercise 2: aq: Semi deep ambulation: forward/retro, lateral x 3 each  Exercise 3: aq: sink ex x 10 each deep  Exercise 4: aq: seated at jets for muscle relaxaion x 5 min while comleting SKTC and piriformis stretch 30\" x 3 B LE's  Exercise 6: aq: deep well hang x 5 min // bicycles x 2 minutes--just hang with 3# ankle weights this date for pain relief  Exercise 7: aq: Shallow single dumbbells 90/90, chest fly x 10 each  Assessment  Body Structures, Functions, Activity Limitations Requiring Skilled Therapeutic Intervention: Decreased functional mobility , Decreased ADL status, Decreased ROM, Decreased body mechanics, Decreased strength, Increased pain, Decreased posture, Decreased balance  Assessment: Continued to progress aquatic routine this date in chest level water to offload joints and reduce compressive forces. Patient able to tolerate initiation of minor resisted core/UE this date. Patient tolerated tx fair- with 2 seated rest breaks for jet massage/stretching to reduce muscle tension. Will continue to progress as tolerated. Patient is scheduled for UPOC next visit, she is very concerned with trying land therapy, PTA advised her to bring her swim

## 2025-07-01 ENCOUNTER — HOSPITAL ENCOUNTER (OUTPATIENT)
Dept: PHYSICAL THERAPY | Age: 60
Setting detail: THERAPIES SERIES
Discharge: HOME | End: 2025-07-01
Payer: COMMERCIAL

## 2025-07-01 PROCEDURE — 97110 THERAPEUTIC EXERCISES: CPT

## 2025-07-01 NOTE — PROGRESS NOTES
Phone: [phone]                 [facility]           Fax: [phone]                           Outpatient Physical Therapy                                                                            Daily Note    Patient: Tanisha Coleman : 1965  [facility] #: [id number]   Referring Physician: Maddi Starks APRN*  Date: 2025    Diagnosis: M54.41 acute low back pain with right sciatica, M79.7 fibromyalgia  Treatment Diagnosis: low back pain with radiation    PT Insurance Information: [place]  Total # of Visits Approved: 9 Per Physician Order  Total # of Visits to Date: 5  No Show: 0  Canceled Appointment: 1    25 Plan of Care/Recert Due    Pre-Treatment Pain:  9/10  Subjective: Pt arrives with standard cane with R radicular symptoms to the foot. 9/10 pain reported today.    Exercises:  Exercise 8: measures obtained for physician update /reasessment      Assessment  Assessment: Pt arrives for UPOC, did not bring swimsuit for aqautics afterward. Measures obtained for physiciain update/reassessment. Pt declining exercises on land after UPOC as she is in too much pain and reports not being ready for land therapy at this time.    Activity Tolerance  Activity Tolerance: Patient limited by pain    Patient Education  Patient Education: POC  Pt verbalized/demonstrated good understanding:     [x] Yes         [] No, pt required further clarification.       Post Treatment Pain:  9/10      Plan  Plan Frequency: 2  Plan weeks: 4       Goals  (Total # of Visits to Date: 5)      Short Term Goals  Time Frame for Short Term Goals: 2 weeks  Short Term Goal 1: Pt will be inititated with HEP. - MET  Short Term Goal 2: Pt will tolerate 30-40 minutes of aquatic ther ex to improve function with ADL's. - MET    Long Term Goals  Time Frame for Long Term Goals : 6 weeks  Long Term Goal 1: Pt will independant and compliant with HEP to maintain functional gains made at therapy. -progressing  Long Term Goal 2: Pt

## 2025-07-01 NOTE — PROGRESS NOTES
[facility]           Phone: [phone]             Outpatient Physical Therapy  Fax: [phone]                                           Date: 2025  Patient: Tanisha Coleman : 1965 [facility] #: [id number]   Referring Physician: Maddi Starks APRN*      [] Plan of Care   [x] Updated Plan of Care    Dates of Service to Include: 2025 to 25    Diagnosis:  M54.41 acute low back pain with right sciatica, M79.7 fibromyalgia     Rehab (Treatment) Diagnosis:  low back pain with radiation           Attendance  Total # of Visits to Date: 5 No Show: 0 Canceled Appointment: 1    Assessment  Assessment: Pt has attended an initial eval and 4 follow up visits for low back pain with R side radiation. Pt has noted some improvement in tolerance to ADLs since starting aquatic therapy, still very limited by pain. B LE strenth defecits at 4-/5 grossly. Pt still demonstrates (+) slump test on R LE, (-) on L.  Pt has met all STGs and making progress toward LTGs. Pt would continue to benefit from aquatic therapy to allow for increased tolerance to strength and mobility exercises d/t offloaded environment and ease on joints to progress toward LTG completion and increase tolerance to ADLs.      Goals  Short Term Goals  Time Frame for Short Term Goals: 2 weeks  Short Term Goal 1: Pt will be inititated with HEP. - MET  Short Term Goal 2: Pt will tolerate 30-40 minutes of aquatic ther ex to improve function with ADL's. - MET  Long Term Goals  Time Frame for Long Term Goals : 6 weeks  Long Term Goal 1: Pt will independant and compliant with HEP to maintain functional gains made at therapy. -progressing  Long Term Goal 2: Pt will report 3/10 or less low back pain on average to facilitate completion of ADL's. - progressing 7-8/10 on average  Long Term Goal 3: Pt to improve B hip strength to 4+/5 grossly for improved stability with

## 2025-07-02 ENCOUNTER — RESULTS FOLLOW-UP (OUTPATIENT)
Dept: PRIMARY CARE CLINIC | Age: 60
End: 2025-07-02

## 2025-07-02 ENCOUNTER — HOSPITAL ENCOUNTER (OUTPATIENT)
Dept: WOMENS IMAGING | Age: 60
Discharge: HOME | End: 2025-07-04
Payer: COMMERCIAL

## 2025-07-02 DIAGNOSIS — Z12.31 ENCOUNTER FOR SCREENING MAMMOGRAM FOR BREAST CANCER: ICD-10-CM

## 2025-07-02 PROCEDURE — 77067 SCR MAMMO BI INCL CAD: CPT

## 2025-07-02 NOTE — TELEPHONE ENCOUNTER
----- Message from AIMEE Pederson CNP sent at 7/2/2025 12:16 PM EDT -----  Please notify patient their Mammogram was normal.  Recommend yearly mammogram screenings.

## 2025-07-03 ENCOUNTER — HOSPITAL ENCOUNTER (OUTPATIENT)
Dept: PHYSICAL THERAPY | Age: 60
Setting detail: THERAPIES SERIES
Discharge: HOME | End: 2025-07-03
Payer: COMMERCIAL

## 2025-07-03 NOTE — PROGRESS NOTES
Physical Therapy  [facility]  Outpatient Rehabilitation  No-Show Note    Date: 7/3/2025  Patient Name: Tanisha Coleman        : 1965       Plan of Care/Recert ends    [x] Pt no showed for scheduled appointment and was contacted regarding compliancy.    [x] As a reminder, Called and left voicemail.       We will monitor attendance for adherence to attendance policy and contact patient/physician as appropriate.        Devyn Ferguson, PTA             Date: 7/3/2025

## 2025-07-07 ENCOUNTER — OFFICE VISIT (OUTPATIENT)
Dept: PRIMARY CARE CLINIC | Age: 60
End: 2025-07-07
Payer: COMMERCIAL

## 2025-07-07 VITALS
HEART RATE: 79 BPM | RESPIRATION RATE: 20 BRPM | BODY MASS INDEX: 40.47 KG/M2 | SYSTOLIC BLOOD PRESSURE: 138 MMHG | OXYGEN SATURATION: 98 % | DIASTOLIC BLOOD PRESSURE: 86 MMHG | WEIGHT: 235.8 LBS

## 2025-07-07 DIAGNOSIS — M54.42 CHRONIC MIDLINE LOW BACK PAIN WITH BILATERAL SCIATICA: Primary | ICD-10-CM

## 2025-07-07 DIAGNOSIS — M54.41 CHRONIC MIDLINE LOW BACK PAIN WITH BILATERAL SCIATICA: Primary | ICD-10-CM

## 2025-07-07 DIAGNOSIS — G89.29 CHRONIC MIDLINE LOW BACK PAIN WITH BILATERAL SCIATICA: Primary | ICD-10-CM

## 2025-07-07 PROCEDURE — G8427 DOCREV CUR MEDS BY ELIG CLIN: HCPCS | Performed by: NURSE PRACTITIONER

## 2025-07-07 PROCEDURE — 1036F TOBACCO NON-USER: CPT | Performed by: NURSE PRACTITIONER

## 2025-07-07 PROCEDURE — 3079F DIAST BP 80-89 MM HG: CPT | Performed by: NURSE PRACTITIONER

## 2025-07-07 PROCEDURE — 3075F SYST BP GE 130 - 139MM HG: CPT | Performed by: NURSE PRACTITIONER

## 2025-07-07 PROCEDURE — 99213 OFFICE O/P EST LOW 20 MIN: CPT | Performed by: NURSE PRACTITIONER

## 2025-07-07 PROCEDURE — G8417 CALC BMI ABV UP PARAM F/U: HCPCS | Performed by: NURSE PRACTITIONER

## 2025-07-07 PROCEDURE — 3017F COLORECTAL CA SCREEN DOC REV: CPT | Performed by: NURSE PRACTITIONER

## 2025-07-07 ASSESSMENT — ENCOUNTER SYMPTOMS
BACK PAIN: 1
RESPIRATORY NEGATIVE: 1
ALLERGIC/IMMUNOLOGIC NEGATIVE: 1
GASTROINTESTINAL NEGATIVE: 1
EYES NEGATIVE: 1

## 2025-07-07 NOTE — PROGRESS NOTES
MPV 10.4 05/20/2025 12:02 PM     Lab Results   Component Value Date/Time    TSH 3.43 10/12/2023 10:15 PM     Lab Results   Component Value Date/Time    CHOL 170 05/20/2025 12:02 PM    LDL 67 05/20/2025 12:02 PM    HDL 38 05/20/2025 12:02 PM    LABA1C 5.4 05/20/2025 12:02 PM       Assessment/Plan:      Diagnosis Orders   1. Chronic midline low back pain with bilateral sciatica  External Referral To Neurosurgery        Referral to [facility] for further evaluation and treatment.    1.  Tanisha received counseling on healthy behaviors and Education discussed during visit.  2.  Patient given educational materials - see patient instructions  3.  Patient was provided AVS.  4.  Discussed use, benefit, and side effects of prescribed medications.  Barriers to medication compliance addressed.  All patient questions answered.Pt voiced understanding.   5.  Reviewed prior labs and health maintenance  6.  Continue current medications, diet and exercise.    Completed Refills   Requested Prescriptions      No prescriptions requested or ordered in this encounter         No follow-ups on file.

## 2025-07-08 ENCOUNTER — HOSPITAL ENCOUNTER (OUTPATIENT)
Dept: PHYSICAL THERAPY | Age: 60
Setting detail: THERAPIES SERIES
Discharge: HOME | End: 2025-07-08
Payer: COMMERCIAL

## 2025-07-08 DIAGNOSIS — G89.29 CHRONIC MIDLINE LOW BACK PAIN WITH BILATERAL SCIATICA: Primary | ICD-10-CM

## 2025-07-08 DIAGNOSIS — M54.41 CHRONIC MIDLINE LOW BACK PAIN WITH BILATERAL SCIATICA: Primary | ICD-10-CM

## 2025-07-08 DIAGNOSIS — M54.42 CHRONIC MIDLINE LOW BACK PAIN WITH BILATERAL SCIATICA: Primary | ICD-10-CM

## 2025-07-08 NOTE — PROGRESS NOTES
Physical Therapy  [facility]  Outpatient Rehabilitation  No-Show Note    Date: 2025  Patient Name: Tanisha Coleman        : 1965       Plan of Care/Recert ends    [x] Pt no showed for scheduled appointment and was contacted regarding compliancy.    [x] As a reminder, called and left voicemail, reminded of next scheduled appointment on 7/10/2025.    We will monitor attendance for adherence to attendance policy and contact patient/physician as appropriate.    Devyn Ferguson, PTA             Date: 2025

## 2025-07-10 ENCOUNTER — HOSPITAL ENCOUNTER (OUTPATIENT)
Dept: PHYSICAL THERAPY | Age: 60
Setting detail: THERAPIES SERIES
Discharge: HOME | End: 2025-07-10
Payer: COMMERCIAL

## 2025-07-10 NOTE — PROGRESS NOTES
Physical Therapy  [facility]  Inpatient/Observation/Outpatient Rehabilitation    Date: 7/10/2025  Patient Name: Tanisha Coleman       [] Inpatient Acute/Observation       [x]  Outpatient  : 1965       Plan of Care/Recert ends      [] Pt refused/declined therapy at this time due to:           [x] Pt cancelled due to:  [] No Reason Given   [] Sick/ill   [x] Other:  referred to [facility] to get her back hardware fixed    [] Evaluation held by RN/Provider/Physical Therapist due to:    [] High Heart Rate   [] High Blood Pressure   [] Orthopedic Consult   [] Hgb < 7   [] Other:    [] Pt ordered brace per physician request:  [] Proper fit will be completed and education for wearing/skin checks    [] Pt does not require skilled services due to:      Therapist/Assistant will attempt to see this patient, at our earliest opportunity.       Consuelo Phillips Date: 7/10/2025

## 2025-07-14 DIAGNOSIS — F31.9 BIPOLAR 1 DISORDER (HCC): ICD-10-CM

## 2025-07-14 DIAGNOSIS — E78.5 HYPERLIPIDEMIA, UNSPECIFIED HYPERLIPIDEMIA TYPE: ICD-10-CM

## 2025-07-14 DIAGNOSIS — K59.09 CHRONIC CONSTIPATION: Primary | ICD-10-CM

## 2025-07-14 RX ORDER — ATORVASTATIN CALCIUM 40 MG/1
40 TABLET, FILM COATED ORAL NIGHTLY
Qty: 90 TABLET | Refills: 1 | Status: SHIPPED | OUTPATIENT
Start: 2025-07-14

## 2025-07-14 RX ORDER — SENNOSIDES 8.6 MG/1
1 TABLET ORAL DAILY
Qty: 90 TABLET | Refills: 1 | Status: SHIPPED | OUTPATIENT
Start: 2025-07-14

## 2025-07-14 RX ORDER — LORAZEPAM 1 MG/1
1 TABLET ORAL 2 TIMES DAILY
Qty: 60 TABLET | Refills: 0 | Status: SHIPPED | OUTPATIENT
Start: 2025-07-14 | End: 2025-08-13

## 2025-07-14 NOTE — TELEPHONE ENCOUNTER
Health Maintenance   Topic Date Due    Hepatitis B vaccine (1 of 3 - 19+ 3-dose series) 10/10/2025 (Originally 9/7/1985)    Hepatitis C screen  05/19/2026 (Originally 9/7/1984)    HIV screen  05/19/2026 (Originally 9/7/1981)    Flu vaccine (1) 08/01/2025    Depression Monitoring  05/19/2026    Lipids  05/20/2026    Cervical cancer screen  02/24/2027    Breast cancer screen  07/02/2027    Diabetes screen  05/20/2028    Colorectal Cancer Screen  05/31/2028    DTaP/Tdap/Td vaccine (2 - Td or Tdap) 05/19/2035    Shingles vaccine  Completed    Pneumococcal 50+ years Vaccine  Completed    COVID-19 Vaccine  Completed    Hepatitis A vaccine  Aged Out    Hib vaccine  Aged Out    Polio vaccine  Aged Out    Meningococcal (ACWY) vaccine  Aged Out    Meningococcal B vaccine  Aged Out    Pneumococcal 0-49 years Vaccine  Discontinued             (applicable per patient's age: Cancer Screenings, Depression Screening, Fall Risk Screening, Immunizations)    Hemoglobin A1C (%)   Date Value   05/20/2025 5.4   10/10/2023 5.1     AST (U/L)   Date Value   05/20/2025 22     ALT (U/L)   Date Value   05/20/2025 9 (L)     BUN (mg/dL)   Date Value   05/20/2025 17      (goal A1C is < 7)   (goal LDL is <100) need 30-50% reduction from baseline     BP Readings from Last 3 Encounters:   07/07/25 138/86   05/19/25 128/72   05/15/25 (!) 142/94    (goal /80)      All Future Testing planned in [facility]:  Lab Frequency Next Occurrence   CT ABDOMEN PELVIS WO CONTRAST Additional Contrast? Radiologist Recommendation Once 11/13/2024   XR KNEE RIGHT (3 VIEWS) Once 02/17/2025   MRI LUMBAR SPINE W WO CONTRAST Once 07/10/2025       Next Visit Date:  Future Appointments   Date Time Provider Department Center   7/31/2025  9:15 AM [facility] MRI [facility] Rad   8/19/2025 11:00 AM Maddi Starks, APRN - CNP [facility] DEP            Patient Active Problem List:     Bipolar 1 disorder (HCC)     Chest pain in adult     Hypertension

## 2025-07-15 ENCOUNTER — APPOINTMENT (OUTPATIENT)
Dept: PHYSICAL THERAPY | Age: 60
End: 2025-07-15
Payer: COMMERCIAL

## 2025-07-30 DIAGNOSIS — M54.42 CHRONIC MIDLINE LOW BACK PAIN WITH BILATERAL SCIATICA: ICD-10-CM

## 2025-07-30 DIAGNOSIS — G89.29 CHRONIC MIDLINE LOW BACK PAIN WITH BILATERAL SCIATICA: ICD-10-CM

## 2025-07-30 DIAGNOSIS — M54.41 CHRONIC MIDLINE LOW BACK PAIN WITH BILATERAL SCIATICA: ICD-10-CM

## 2025-08-04 ENCOUNTER — RESULTS FOLLOW-UP (OUTPATIENT)
Dept: PRIMARY CARE CLINIC | Age: 60
End: 2025-08-04

## 2025-08-07 DIAGNOSIS — M25.561 RIGHT ANTERIOR KNEE PAIN: ICD-10-CM

## 2025-08-07 RX ORDER — CELECOXIB 100 MG/1
100 CAPSULE ORAL 2 TIMES DAILY
Qty: 60 CAPSULE | Refills: 0 | Status: SHIPPED | OUTPATIENT
Start: 2025-08-07

## 2025-08-19 ENCOUNTER — OFFICE VISIT (OUTPATIENT)
Dept: PRIMARY CARE CLINIC | Age: 60
End: 2025-08-19
Payer: COMMERCIAL

## 2025-08-19 VITALS
BODY MASS INDEX: 38.52 KG/M2 | OXYGEN SATURATION: 96 % | HEART RATE: 81 BPM | RESPIRATION RATE: 16 BRPM | DIASTOLIC BLOOD PRESSURE: 84 MMHG | TEMPERATURE: 98.6 F | WEIGHT: 224.4 LBS | SYSTOLIC BLOOD PRESSURE: 126 MMHG

## 2025-08-19 DIAGNOSIS — G89.29 CHRONIC MIDLINE LOW BACK PAIN WITH BILATERAL SCIATICA: Primary | ICD-10-CM

## 2025-08-19 DIAGNOSIS — E78.5 HYPERLIPIDEMIA, UNSPECIFIED HYPERLIPIDEMIA TYPE: ICD-10-CM

## 2025-08-19 DIAGNOSIS — L24.0 IRRITANT CONTACT DERMATITIS DUE TO DETERGENT: ICD-10-CM

## 2025-08-19 DIAGNOSIS — M54.42 CHRONIC MIDLINE LOW BACK PAIN WITH BILATERAL SCIATICA: Primary | ICD-10-CM

## 2025-08-19 DIAGNOSIS — M54.41 CHRONIC MIDLINE LOW BACK PAIN WITH BILATERAL SCIATICA: Primary | ICD-10-CM

## 2025-08-19 PROCEDURE — G8417 CALC BMI ABV UP PARAM F/U: HCPCS | Performed by: NURSE PRACTITIONER

## 2025-08-19 PROCEDURE — 1036F TOBACCO NON-USER: CPT | Performed by: NURSE PRACTITIONER

## 2025-08-19 PROCEDURE — 3079F DIAST BP 80-89 MM HG: CPT | Performed by: NURSE PRACTITIONER

## 2025-08-19 PROCEDURE — 99213 OFFICE O/P EST LOW 20 MIN: CPT | Performed by: NURSE PRACTITIONER

## 2025-08-19 PROCEDURE — 3017F COLORECTAL CA SCREEN DOC REV: CPT | Performed by: NURSE PRACTITIONER

## 2025-08-19 PROCEDURE — 3074F SYST BP LT 130 MM HG: CPT | Performed by: NURSE PRACTITIONER

## 2025-08-19 PROCEDURE — G8427 DOCREV CUR MEDS BY ELIG CLIN: HCPCS | Performed by: NURSE PRACTITIONER

## 2025-08-19 RX ORDER — DULOXETIN HYDROCHLORIDE 60 MG/1
60 CAPSULE, DELAYED RELEASE ORAL 2 TIMES DAILY
COMMUNITY
Start: 2025-07-10

## 2025-08-19 ASSESSMENT — ENCOUNTER SYMPTOMS
RESPIRATORY NEGATIVE: 1
EYES NEGATIVE: 1
ALLERGIC/IMMUNOLOGIC NEGATIVE: 1
BACK PAIN: 1
GASTROINTESTINAL NEGATIVE: 1

## 2025-08-19 ASSESSMENT — PATIENT HEALTH QUESTIONNAIRE - PHQ9
9. THOUGHTS THAT YOU WOULD BE BETTER OFF DEAD, OR OF HURTING YOURSELF: NOT AT ALL
SUM OF ALL RESPONSES TO PHQ QUESTIONS 1-9: 0
2. FEELING DOWN, DEPRESSED OR HOPELESS: NOT AT ALL
SUM OF ALL RESPONSES TO PHQ QUESTIONS 1-9: 0
SUM OF ALL RESPONSES TO PHQ QUESTIONS 1-9: 0
10. IF YOU CHECKED OFF ANY PROBLEMS, HOW DIFFICULT HAVE THESE PROBLEMS MADE IT FOR YOU TO DO YOUR WORK, TAKE CARE OF THINGS AT HOME, OR GET ALONG WITH OTHER PEOPLE: NOT DIFFICULT AT ALL
1. LITTLE INTEREST OR PLEASURE IN DOING THINGS: NOT AT ALL
7. TROUBLE CONCENTRATING ON THINGS, SUCH AS READING THE NEWSPAPER OR WATCHING TELEVISION: NOT AT ALL
SUM OF ALL RESPONSES TO PHQ QUESTIONS 1-9: 0
3. TROUBLE FALLING OR STAYING ASLEEP: NOT AT ALL
6. FEELING BAD ABOUT YOURSELF - OR THAT YOU ARE A FAILURE OR HAVE LET YOURSELF OR YOUR FAMILY DOWN: NOT AT ALL
4. FEELING TIRED OR HAVING LITTLE ENERGY: NOT AT ALL
8. MOVING OR SPEAKING SO SLOWLY THAT OTHER PEOPLE COULD HAVE NOTICED. OR THE OPPOSITE, BEING SO FIGETY OR RESTLESS THAT YOU HAVE BEEN MOVING AROUND A LOT MORE THAN USUAL: NOT AT ALL
5. POOR APPETITE OR OVEREATING: NOT AT ALL

## 2025-08-24 DIAGNOSIS — K21.9 GASTROESOPHAGEAL REFLUX DISEASE WITHOUT ESOPHAGITIS: ICD-10-CM

## 2025-08-24 DIAGNOSIS — G25.81 RLS (RESTLESS LEGS SYNDROME): ICD-10-CM

## 2025-08-25 RX ORDER — ROPINIROLE 2 MG/1
TABLET, FILM COATED ORAL
Qty: 180 TABLET | Refills: 1 | Status: SHIPPED | OUTPATIENT
Start: 2025-08-25

## 2025-08-25 RX ORDER — ASPIRIN 81 MG/1
81 TABLET ORAL DAILY
Qty: 90 TABLET | Refills: 1 | Status: SHIPPED | OUTPATIENT
Start: 2025-08-25

## 2025-08-25 RX ORDER — PANTOPRAZOLE SODIUM 40 MG/1
40 TABLET, DELAYED RELEASE ORAL DAILY
Qty: 90 TABLET | Refills: 1 | Status: SHIPPED | OUTPATIENT
Start: 2025-08-25